# Patient Record
Sex: MALE | Race: WHITE | NOT HISPANIC OR LATINO | Employment: OTHER | URBAN - METROPOLITAN AREA
[De-identification: names, ages, dates, MRNs, and addresses within clinical notes are randomized per-mention and may not be internally consistent; named-entity substitution may affect disease eponyms.]

---

## 2020-10-06 ENCOUNTER — HOSPITAL ENCOUNTER (INPATIENT)
Facility: HOSPITAL | Age: 75
LOS: 9 days | Discharge: NURSING, REHAB OR LONG-TERM CARE | DRG: 897 | End: 2020-10-15
Attending: EMERGENCY MEDICINE | Admitting: INTERNAL MEDICINE
Payer: MEDICARE

## 2020-10-06 DIAGNOSIS — R56.9 CONVULSIONS, UNSPECIFIED CONVULSION TYPE: Primary | ICD-10-CM

## 2020-10-06 DIAGNOSIS — E87.1 HYPONATREMIA: ICD-10-CM

## 2020-10-06 DIAGNOSIS — R56.9 NEW ONSET SEIZURE: ICD-10-CM

## 2020-10-06 DIAGNOSIS — M25.562 PAIN IN BOTH KNEES, UNSPECIFIED CHRONICITY: Chronic | ICD-10-CM

## 2020-10-06 DIAGNOSIS — M11.89 PSEUDOGOUT INVOLVING MULTIPLE JOINTS: ICD-10-CM

## 2020-10-06 DIAGNOSIS — R55 SYNCOPE, UNSPECIFIED SYNCOPE TYPE: ICD-10-CM

## 2020-10-06 DIAGNOSIS — M25.561 PAIN IN BOTH KNEES, UNSPECIFIED CHRONICITY: Chronic | ICD-10-CM

## 2020-10-06 DIAGNOSIS — M79.672 BILATERAL PAIN OF LEG AND FOOT: ICD-10-CM

## 2020-10-06 DIAGNOSIS — R07.9 CHEST PAIN: ICD-10-CM

## 2020-10-06 DIAGNOSIS — D72.829 LEUKOCYTOSIS, UNSPECIFIED TYPE: ICD-10-CM

## 2020-10-06 DIAGNOSIS — L03.116 CELLULITIS OF LEFT LOWER EXTREMITY: ICD-10-CM

## 2020-10-06 DIAGNOSIS — M79.671 BILATERAL PAIN OF LEG AND FOOT: ICD-10-CM

## 2020-10-06 DIAGNOSIS — M79.604 BILATERAL PAIN OF LEG AND FOOT: ICD-10-CM

## 2020-10-06 DIAGNOSIS — R55 SYNCOPE: ICD-10-CM

## 2020-10-06 DIAGNOSIS — M79.605 BILATERAL PAIN OF LEG AND FOOT: ICD-10-CM

## 2020-10-06 LAB
ALBUMIN SERPL BCP-MCNC: 3.9 G/DL (ref 3.5–5.2)
ALP SERPL-CCNC: 94 U/L (ref 55–135)
ALT SERPL W/O P-5'-P-CCNC: 21 U/L (ref 10–44)
ANION GAP SERPL CALC-SCNC: 12 MMOL/L (ref 8–16)
AST SERPL-CCNC: 33 U/L (ref 10–40)
BASOPHILS # BLD AUTO: 0.04 K/UL (ref 0–0.2)
BASOPHILS NFR BLD: 0.3 % (ref 0–1.9)
BILIRUB SERPL-MCNC: 1.1 MG/DL (ref 0.1–1)
BILIRUB UR QL STRIP: NEGATIVE
BNP SERPL-MCNC: 97 PG/ML (ref 0–99)
BUN SERPL-MCNC: 6 MG/DL (ref 6–30)
BUN SERPL-MCNC: 7 MG/DL (ref 8–23)
CALCIUM SERPL-MCNC: 9 MG/DL (ref 8.7–10.5)
CHLORIDE SERPL-SCNC: 94 MMOL/L (ref 95–110)
CHLORIDE SERPL-SCNC: 95 MMOL/L (ref 95–110)
CLARITY UR REFRACT.AUTO: CLEAR
CO2 SERPL-SCNC: 24 MMOL/L (ref 23–29)
COLOR UR AUTO: YELLOW
CREAT SERPL-MCNC: 0.8 MG/DL (ref 0.5–1.4)
CREAT SERPL-MCNC: 0.9 MG/DL (ref 0.5–1.4)
DIFFERENTIAL METHOD: ABNORMAL
EOSINOPHIL # BLD AUTO: 0 K/UL (ref 0–0.5)
EOSINOPHIL NFR BLD: 0.1 % (ref 0–8)
ERYTHROCYTE [DISTWIDTH] IN BLOOD BY AUTOMATED COUNT: 12.3 % (ref 11.5–14.5)
EST. GFR  (AFRICAN AMERICAN): >60 ML/MIN/1.73 M^2
EST. GFR  (NON AFRICAN AMERICAN): >60 ML/MIN/1.73 M^2
ETHANOL SERPL-MCNC: <10 MG/DL
GLUCOSE SERPL-MCNC: 164 MG/DL (ref 70–110)
GLUCOSE SERPL-MCNC: 169 MG/DL (ref 70–110)
GLUCOSE UR QL STRIP: NEGATIVE
HCT VFR BLD AUTO: 43.1 % (ref 40–54)
HCT VFR BLD CALC: 45 %PCV (ref 36–54)
HGB BLD-MCNC: 15 G/DL (ref 14–18)
HGB UR QL STRIP: NEGATIVE
IMM GRANULOCYTES # BLD AUTO: 0.06 K/UL (ref 0–0.04)
IMM GRANULOCYTES NFR BLD AUTO: 0.4 % (ref 0–0.5)
INR PPP: 1 (ref 0.8–1.2)
KETONES UR QL STRIP: NEGATIVE
LEUKOCYTE ESTERASE UR QL STRIP: NEGATIVE
LYMPHOCYTES # BLD AUTO: 0.9 K/UL (ref 1–4.8)
LYMPHOCYTES NFR BLD: 6.4 % (ref 18–48)
MCH RBC QN AUTO: 32.3 PG (ref 27–31)
MCHC RBC AUTO-ENTMCNC: 34.8 G/DL (ref 32–36)
MCV RBC AUTO: 93 FL (ref 82–98)
MONOCYTES # BLD AUTO: 1.4 K/UL (ref 0.3–1)
MONOCYTES NFR BLD: 9.7 % (ref 4–15)
NEUTROPHILS # BLD AUTO: 12.2 K/UL (ref 1.8–7.7)
NEUTROPHILS NFR BLD: 83.1 % (ref 38–73)
NITRITE UR QL STRIP: NEGATIVE
NRBC BLD-RTO: 0 /100 WBC
PH UR STRIP: 6 [PH] (ref 5–8)
PLATELET # BLD AUTO: 271 K/UL (ref 150–350)
PMV BLD AUTO: 11.1 FL (ref 9.2–12.9)
POC IONIZED CALCIUM: 1.08 MMOL/L (ref 1.06–1.42)
POC TCO2 (MEASURED): 23 MMOL/L (ref 23–29)
POTASSIUM BLD-SCNC: 3.5 MMOL/L (ref 3.5–5.1)
POTASSIUM SERPL-SCNC: 3.5 MMOL/L (ref 3.5–5.1)
PROT SERPL-MCNC: 8 G/DL (ref 6–8.4)
PROT UR QL STRIP: NEGATIVE
PROTHROMBIN TIME: 11.4 SEC (ref 9–12.5)
RBC # BLD AUTO: 4.64 M/UL (ref 4.6–6.2)
SAMPLE: ABNORMAL
SODIUM BLD-SCNC: 132 MMOL/L (ref 136–145)
SODIUM SERPL-SCNC: 131 MMOL/L (ref 136–145)
SP GR UR STRIP: 1.01 (ref 1–1.03)
URN SPEC COLLECT METH UR: NORMAL
WBC # BLD AUTO: 14.71 K/UL (ref 3.9–12.7)

## 2020-10-06 PROCEDURE — 80047 BASIC METABLC PNL IONIZED CA: CPT

## 2020-10-06 PROCEDURE — 99291 CRITICAL CARE FIRST HOUR: CPT | Mod: 25

## 2020-10-06 PROCEDURE — U0002 COVID-19 LAB TEST NON-CDC: HCPCS | Performed by: EMERGENCY MEDICINE

## 2020-10-06 PROCEDURE — 93005 ELECTROCARDIOGRAM TRACING: CPT

## 2020-10-06 PROCEDURE — 93010 ELECTROCARDIOGRAM REPORT: CPT | Mod: ,,, | Performed by: INTERNAL MEDICINE

## 2020-10-06 PROCEDURE — 85610 PROTHROMBIN TIME: CPT

## 2020-10-06 PROCEDURE — 99291 CRITICAL CARE FIRST HOUR: CPT | Mod: GC,CS,, | Performed by: EMERGENCY MEDICINE

## 2020-10-06 PROCEDURE — 81003 URINALYSIS AUTO W/O SCOPE: CPT

## 2020-10-06 PROCEDURE — 12000002 HC ACUTE/MED SURGE SEMI-PRIVATE ROOM

## 2020-10-06 PROCEDURE — 99291 PR CRITICAL CARE, E/M 30-74 MINUTES: ICD-10-PCS | Mod: GC,CS,, | Performed by: EMERGENCY MEDICINE

## 2020-10-06 PROCEDURE — 96365 THER/PROPH/DIAG IV INF INIT: CPT

## 2020-10-06 PROCEDURE — 83880 ASSAY OF NATRIURETIC PEPTIDE: CPT

## 2020-10-06 PROCEDURE — 80053 COMPREHEN METABOLIC PANEL: CPT

## 2020-10-06 PROCEDURE — 85025 COMPLETE CBC W/AUTO DIFF WBC: CPT

## 2020-10-06 PROCEDURE — 80320 DRUG SCREEN QUANTALCOHOLS: CPT

## 2020-10-06 PROCEDURE — 93010 EKG 12-LEAD: ICD-10-PCS | Mod: ,,, | Performed by: INTERNAL MEDICINE

## 2020-10-06 PROCEDURE — 63600175 PHARM REV CODE 636 W HCPCS: Performed by: EMERGENCY MEDICINE

## 2020-10-06 RX ORDER — LORAZEPAM 2 MG/ML
INJECTION INTRAMUSCULAR
Status: DISPENSED
Start: 2020-10-06 | End: 2020-10-07

## 2020-10-06 RX ORDER — LEVETIRACETAM 10 MG/ML
1000 INJECTION INTRAVASCULAR
Status: COMPLETED | OUTPATIENT
Start: 2020-10-07 | End: 2020-10-07

## 2020-10-06 RX ORDER — LORAZEPAM 2 MG/ML
2 INJECTION INTRAMUSCULAR
Status: DISCONTINUED | OUTPATIENT
Start: 2020-10-06 | End: 2020-10-06 | Stop reason: ALTCHOICE

## 2020-10-06 RX ORDER — LEVETIRACETAM 10 MG/ML
1000 INJECTION INTRAVASCULAR
Status: COMPLETED | OUTPATIENT
Start: 2020-10-06 | End: 2020-10-07

## 2020-10-06 RX ADMIN — LEVETIRACETAM INJECTION 1000 MG: 10 INJECTION INTRAVENOUS at 11:10

## 2020-10-07 PROBLEM — L03.116 CELLULITIS OF LEFT LOWER EXTREMITY: Status: ACTIVE | Noted: 2020-10-07

## 2020-10-07 PROBLEM — R55 SYNCOPE: Status: ACTIVE | Noted: 2020-10-07

## 2020-10-07 PROBLEM — R56.9 CONVULSION: Status: ACTIVE | Noted: 2020-10-07

## 2020-10-07 PROBLEM — R07.89 OTHER CHEST PAIN: Status: ACTIVE | Noted: 2020-10-07

## 2020-10-07 LAB
ALBUMIN SERPL BCP-MCNC: 3.7 G/DL (ref 3.5–5.2)
ALBUMIN SERPL BCP-MCNC: 3.7 G/DL (ref 3.5–5.2)
ALP SERPL-CCNC: 90 U/L (ref 55–135)
ALP SERPL-CCNC: 91 U/L (ref 55–135)
ALT SERPL W/O P-5'-P-CCNC: 19 U/L (ref 10–44)
ALT SERPL W/O P-5'-P-CCNC: 20 U/L (ref 10–44)
AMMONIA PLAS-SCNC: 32 UMOL/L (ref 10–50)
ANION GAP SERPL CALC-SCNC: 12 MMOL/L (ref 8–16)
ANION GAP SERPL CALC-SCNC: 16 MMOL/L (ref 8–16)
ASCENDING AORTA: 3.72 CM
AST SERPL-CCNC: 37 U/L (ref 10–40)
AST SERPL-CCNC: 39 U/L (ref 10–40)
AV INDEX (PROSTH): 1.3
AV MEAN GRADIENT: 2 MMHG
AV PEAK GRADIENT: 3 MMHG
AV VALVE AREA: 6.06 CM2
AV VELOCITY RATIO: 1.16
BASOPHILS # BLD AUTO: 0.03 K/UL (ref 0–0.2)
BASOPHILS # BLD AUTO: 0.04 K/UL (ref 0–0.2)
BASOPHILS NFR BLD: 0.2 % (ref 0–1.9)
BASOPHILS NFR BLD: 0.3 % (ref 0–1.9)
BILIRUB SERPL-MCNC: 1.3 MG/DL (ref 0.1–1)
BILIRUB SERPL-MCNC: 1.5 MG/DL (ref 0.1–1)
BSA FOR ECHO PROCEDURE: 2.24 M2
BUN SERPL-MCNC: 5 MG/DL (ref 8–23)
BUN SERPL-MCNC: 6 MG/DL (ref 8–23)
CALCIUM SERPL-MCNC: 8.5 MG/DL (ref 8.7–10.5)
CALCIUM SERPL-MCNC: 8.5 MG/DL (ref 8.7–10.5)
CHLORIDE SERPL-SCNC: 97 MMOL/L (ref 95–110)
CHLORIDE SERPL-SCNC: 99 MMOL/L (ref 95–110)
CO2 SERPL-SCNC: 21 MMOL/L (ref 23–29)
CO2 SERPL-SCNC: 25 MMOL/L (ref 23–29)
CREAT SERPL-MCNC: 0.8 MG/DL (ref 0.5–1.4)
CREAT SERPL-MCNC: 0.8 MG/DL (ref 0.5–1.4)
CRP SERPL-MCNC: 33 MG/L (ref 0–8.2)
CTP QC/QA: YES
CV ECHO LV RWT: 0.31 CM
DIFFERENTIAL METHOD: ABNORMAL
DIFFERENTIAL METHOD: ABNORMAL
DOP CALC AO PEAK VEL: 0.89 M/S
DOP CALC AO VTI: 20.04 CM
DOP CALC LVOT AREA: 4.7 CM2
DOP CALC LVOT DIAMETER: 2.44 CM
DOP CALC LVOT PEAK VEL: 1.03 M/S
DOP CALC LVOT STROKE VOLUME: 121.51 CM3
DOP CALCLVOT PEAK VEL VTI: 26 CM
E WAVE DECELERATION TIME: 277.03 MSEC
E/A RATIO: 0.96
E/E' RATIO: 10.93 M/S
ECHO LV POSTERIOR WALL: 0.74 CM (ref 0.6–1.1)
EOSINOPHIL # BLD AUTO: 0 K/UL (ref 0–0.5)
EOSINOPHIL # BLD AUTO: 0 K/UL (ref 0–0.5)
EOSINOPHIL NFR BLD: 0 % (ref 0–8)
EOSINOPHIL NFR BLD: 0.2 % (ref 0–8)
ERYTHROCYTE [DISTWIDTH] IN BLOOD BY AUTOMATED COUNT: 12.3 % (ref 11.5–14.5)
ERYTHROCYTE [DISTWIDTH] IN BLOOD BY AUTOMATED COUNT: 12.4 % (ref 11.5–14.5)
ERYTHROCYTE [SEDIMENTATION RATE] IN BLOOD BY WESTERGREN METHOD: 25 MM/HR (ref 0–23)
EST. GFR  (AFRICAN AMERICAN): >60 ML/MIN/1.73 M^2
EST. GFR  (AFRICAN AMERICAN): >60 ML/MIN/1.73 M^2
EST. GFR  (NON AFRICAN AMERICAN): >60 ML/MIN/1.73 M^2
EST. GFR  (NON AFRICAN AMERICAN): >60 ML/MIN/1.73 M^2
ESTIMATED AVG GLUCOSE: 111 MG/DL (ref 68–131)
FRACTIONAL SHORTENING: 34 % (ref 28–44)
GLUCOSE SERPL-MCNC: 116 MG/DL (ref 70–110)
GLUCOSE SERPL-MCNC: 125 MG/DL (ref 70–110)
HBA1C MFR BLD HPLC: 5.5 % (ref 4–5.6)
HCT VFR BLD AUTO: 42 % (ref 40–54)
HCT VFR BLD AUTO: 43.9 % (ref 40–54)
HGB BLD-MCNC: 14.7 G/DL (ref 14–18)
HGB BLD-MCNC: 15.1 G/DL (ref 14–18)
IMM GRANULOCYTES # BLD AUTO: 0.07 K/UL (ref 0–0.04)
IMM GRANULOCYTES # BLD AUTO: 0.09 K/UL (ref 0–0.04)
IMM GRANULOCYTES NFR BLD AUTO: 0.5 % (ref 0–0.5)
IMM GRANULOCYTES NFR BLD AUTO: 0.7 % (ref 0–0.5)
INTERVENTRICULAR SEPTUM: 0.91 CM (ref 0.6–1.1)
LA MAJOR: 4.82 CM
LA MINOR: 5.33 CM
LA WIDTH: 4.35 CM
LACTATE SERPL-SCNC: 1.3 MMOL/L (ref 0.5–2.2)
LEFT ATRIUM SIZE: 3 CM
LEFT ATRIUM VOLUME INDEX: 25.6 ML/M2
LEFT ATRIUM VOLUME: 56.15 CM3
LEFT INTERNAL DIMENSION IN SYSTOLE: 3.17 CM (ref 2.1–4)
LEFT VENTRICLE DIASTOLIC VOLUME INDEX: 49.49 ML/M2
LEFT VENTRICLE DIASTOLIC VOLUME: 108.6 ML
LEFT VENTRICLE MASS INDEX: 61 G/M2
LEFT VENTRICLE SYSTOLIC VOLUME INDEX: 18.2 ML/M2
LEFT VENTRICLE SYSTOLIC VOLUME: 40.01 ML
LEFT VENTRICULAR INTERNAL DIMENSION IN DIASTOLE: 4.82 CM (ref 3.5–6)
LEFT VENTRICULAR MASS: 132.77 G
LV LATERAL E/E' RATIO: 10.25 M/S
LV SEPTAL E/E' RATIO: 11.71 M/S
LYMPHOCYTES # BLD AUTO: 1 K/UL (ref 1–4.8)
LYMPHOCYTES # BLD AUTO: 1.3 K/UL (ref 1–4.8)
LYMPHOCYTES NFR BLD: 7.8 % (ref 18–48)
LYMPHOCYTES NFR BLD: 8.7 % (ref 18–48)
MAGNESIUM SERPL-MCNC: 2.1 MG/DL (ref 1.6–2.6)
MCH RBC QN AUTO: 32.3 PG (ref 27–31)
MCH RBC QN AUTO: 32.3 PG (ref 27–31)
MCHC RBC AUTO-ENTMCNC: 34.4 G/DL (ref 32–36)
MCHC RBC AUTO-ENTMCNC: 35 G/DL (ref 32–36)
MCV RBC AUTO: 92 FL (ref 82–98)
MCV RBC AUTO: 94 FL (ref 82–98)
MONOCYTES # BLD AUTO: 1.5 K/UL (ref 0.3–1)
MONOCYTES # BLD AUTO: 1.7 K/UL (ref 0.3–1)
MONOCYTES NFR BLD: 10.4 % (ref 4–15)
MONOCYTES NFR BLD: 12.8 % (ref 4–15)
MV PEAK A VEL: 0.85 M/S
MV PEAK E VEL: 0.82 M/S
MV STENOSIS PRESSURE HALF TIME: 80.34 MS
MV VALVE AREA P 1/2 METHOD: 2.74 CM2
NEUTROPHILS # BLD AUTO: 10.1 K/UL (ref 1.8–7.7)
NEUTROPHILS # BLD AUTO: 11.8 K/UL (ref 1.8–7.7)
NEUTROPHILS NFR BLD: 78.2 % (ref 38–73)
NEUTROPHILS NFR BLD: 80.2 % (ref 38–73)
NRBC BLD-RTO: 0 /100 WBC
NRBC BLD-RTO: 0 /100 WBC
PHOSPHATE SERPL-MCNC: 3 MG/DL (ref 2.7–4.5)
PLATELET # BLD AUTO: 255 K/UL (ref 150–350)
PLATELET # BLD AUTO: 261 K/UL (ref 150–350)
PMV BLD AUTO: 11.4 FL (ref 9.2–12.9)
PMV BLD AUTO: 11.4 FL (ref 9.2–12.9)
POCT GLUCOSE: 132 MG/DL (ref 70–110)
POTASSIUM SERPL-SCNC: 3.1 MMOL/L (ref 3.5–5.1)
POTASSIUM SERPL-SCNC: 3.1 MMOL/L (ref 3.5–5.1)
PROT SERPL-MCNC: 7.6 G/DL (ref 6–8.4)
PROT SERPL-MCNC: 7.6 G/DL (ref 6–8.4)
RA MAJOR: 4.59 CM
RA PRESSURE: 3 MMHG
RA WIDTH: 3.16 CM
RBC # BLD AUTO: 4.55 M/UL (ref 4.6–6.2)
RBC # BLD AUTO: 4.67 M/UL (ref 4.6–6.2)
RV TISSUE DOPPLER FREE WALL SYSTOLIC VELOCITY 1 (APICAL 4 CHAMBER VIEW): 16.53 CM/S
SARS-COV-2 RDRP RESP QL NAA+PROBE: NEGATIVE
SINUS: 3.8 CM
SODIUM SERPL-SCNC: 134 MMOL/L (ref 136–145)
SODIUM SERPL-SCNC: 136 MMOL/L (ref 136–145)
STJ: 3.68 CM
TDI LATERAL: 0.08 M/S
TDI SEPTAL: 0.07 M/S
TDI: 0.08 M/S
TRICUSPID ANNULAR PLANE SYSTOLIC EXCURSION: 1.95 CM
TROPONIN I SERPL DL<=0.01 NG/ML-MCNC: <0.006 NG/ML (ref 0–0.03)
WBC # BLD AUTO: 12.85 K/UL (ref 3.9–12.7)
WBC # BLD AUTO: 14.74 K/UL (ref 3.9–12.7)

## 2020-10-07 PROCEDURE — 96366 THER/PROPH/DIAG IV INF ADDON: CPT

## 2020-10-07 PROCEDURE — 99220 PR INITIAL OBSERVATION CARE,LEVL III: ICD-10-PCS | Mod: ,,, | Performed by: NURSE PRACTITIONER

## 2020-10-07 PROCEDURE — 25500020 PHARM REV CODE 255: Performed by: INTERNAL MEDICINE

## 2020-10-07 PROCEDURE — 84484 ASSAY OF TROPONIN QUANT: CPT

## 2020-10-07 PROCEDURE — 83036 HEMOGLOBIN GLYCOSYLATED A1C: CPT

## 2020-10-07 PROCEDURE — 96376 TX/PRO/DX INJ SAME DRUG ADON: CPT

## 2020-10-07 PROCEDURE — 63600175 PHARM REV CODE 636 W HCPCS: Performed by: EMERGENCY MEDICINE

## 2020-10-07 PROCEDURE — 82140 ASSAY OF AMMONIA: CPT

## 2020-10-07 PROCEDURE — 94761 N-INVAS EAR/PLS OXIMETRY MLT: CPT

## 2020-10-07 PROCEDURE — 85025 COMPLETE CBC W/AUTO DIFF WBC: CPT | Mod: 91

## 2020-10-07 PROCEDURE — 99205 OFFICE O/P NEW HI 60 MIN: CPT | Mod: ,,, | Performed by: PSYCHIATRY & NEUROLOGY

## 2020-10-07 PROCEDURE — 93010 ELECTROCARDIOGRAM REPORT: CPT | Mod: ,,, | Performed by: INTERNAL MEDICINE

## 2020-10-07 PROCEDURE — 96361 HYDRATE IV INFUSION ADD-ON: CPT

## 2020-10-07 PROCEDURE — 83735 ASSAY OF MAGNESIUM: CPT

## 2020-10-07 PROCEDURE — 85652 RBC SED RATE AUTOMATED: CPT

## 2020-10-07 PROCEDURE — G0378 HOSPITAL OBSERVATION PER HR: HCPCS

## 2020-10-07 PROCEDURE — 86140 C-REACTIVE PROTEIN: CPT

## 2020-10-07 PROCEDURE — 63600175 PHARM REV CODE 636 W HCPCS: Performed by: NURSE PRACTITIONER

## 2020-10-07 PROCEDURE — A9585 GADOBUTROL INJECTION: HCPCS | Performed by: INTERNAL MEDICINE

## 2020-10-07 PROCEDURE — 25000003 PHARM REV CODE 250: Performed by: NURSE PRACTITIONER

## 2020-10-07 PROCEDURE — 93010 EKG 12-LEAD: ICD-10-PCS | Mod: ,,, | Performed by: INTERNAL MEDICINE

## 2020-10-07 PROCEDURE — 99220 PR INITIAL OBSERVATION CARE,LEVL III: CPT | Mod: ,,, | Performed by: NURSE PRACTITIONER

## 2020-10-07 PROCEDURE — 11000001 HC ACUTE MED/SURG PRIVATE ROOM

## 2020-10-07 PROCEDURE — 96372 THER/PROPH/DIAG INJ SC/IM: CPT | Mod: 59

## 2020-10-07 PROCEDURE — 93005 ELECTROCARDIOGRAM TRACING: CPT

## 2020-10-07 PROCEDURE — 63600175 PHARM REV CODE 636 W HCPCS: Performed by: INTERNAL MEDICINE

## 2020-10-07 PROCEDURE — 83605 ASSAY OF LACTIC ACID: CPT

## 2020-10-07 PROCEDURE — 99217 PR OBSERVATION CARE DISCHARGE: ICD-10-PCS | Mod: ,,, | Performed by: PHYSICIAN ASSISTANT

## 2020-10-07 PROCEDURE — 36415 COLL VENOUS BLD VENIPUNCTURE: CPT

## 2020-10-07 PROCEDURE — 84100 ASSAY OF PHOSPHORUS: CPT

## 2020-10-07 PROCEDURE — 99205 PR OFFICE/OUTPT VISIT, NEW, LEVL V, 60-74 MIN: ICD-10-PCS | Mod: ,,, | Performed by: PSYCHIATRY & NEUROLOGY

## 2020-10-07 PROCEDURE — 25000003 PHARM REV CODE 250: Performed by: PHYSICIAN ASSISTANT

## 2020-10-07 PROCEDURE — 80053 COMPREHEN METABOLIC PANEL: CPT | Mod: 91

## 2020-10-07 PROCEDURE — 99217 PR OBSERVATION CARE DISCHARGE: CPT | Mod: ,,, | Performed by: PHYSICIAN ASSISTANT

## 2020-10-07 PROCEDURE — 96375 TX/PRO/DX INJ NEW DRUG ADDON: CPT

## 2020-10-07 PROCEDURE — 87040 BLOOD CULTURE FOR BACTERIA: CPT | Mod: 59

## 2020-10-07 PROCEDURE — 80053 COMPREHEN METABOLIC PANEL: CPT

## 2020-10-07 PROCEDURE — 25000003 PHARM REV CODE 250: Performed by: INTERNAL MEDICINE

## 2020-10-07 RX ORDER — AMLODIPINE BESYLATE 10 MG/1
10 TABLET ORAL DAILY
Status: DISCONTINUED | OUTPATIENT
Start: 2020-10-07 | End: 2020-10-15 | Stop reason: HOSPADM

## 2020-10-07 RX ORDER — IBUPROFEN 200 MG
16 TABLET ORAL
Status: DISCONTINUED | OUTPATIENT
Start: 2020-10-07 | End: 2020-10-15 | Stop reason: HOSPADM

## 2020-10-07 RX ORDER — FOLIC ACID 5 MG/ML
1 INJECTION, SOLUTION INTRAMUSCULAR; INTRAVENOUS; SUBCUTANEOUS DAILY
Status: DISCONTINUED | OUTPATIENT
Start: 2020-10-07 | End: 2020-10-08

## 2020-10-07 RX ORDER — POLYETHYLENE GLYCOL 3350 17 G/17G
17 POWDER, FOR SOLUTION ORAL DAILY
Status: DISCONTINUED | OUTPATIENT
Start: 2020-10-07 | End: 2020-10-15 | Stop reason: HOSPADM

## 2020-10-07 RX ORDER — LEVETIRACETAM 5 MG/ML
500 INJECTION INTRAVASCULAR EVERY 12 HOURS
Status: DISCONTINUED | OUTPATIENT
Start: 2020-10-07 | End: 2020-10-07

## 2020-10-07 RX ORDER — FUROSEMIDE 40 MG/1
40 TABLET ORAL DAILY
COMMUNITY
Start: 2020-09-23 | End: 2022-07-28

## 2020-10-07 RX ORDER — HYDROCHLOROTHIAZIDE 12.5 MG/1
12.5 TABLET ORAL DAILY
Status: DISCONTINUED | OUTPATIENT
Start: 2020-10-07 | End: 2020-10-15 | Stop reason: HOSPADM

## 2020-10-07 RX ORDER — IPRATROPIUM BROMIDE AND ALBUTEROL SULFATE 2.5; .5 MG/3ML; MG/3ML
3 SOLUTION RESPIRATORY (INHALATION) EVERY 6 HOURS PRN
Status: DISCONTINUED | OUTPATIENT
Start: 2020-10-07 | End: 2020-10-15 | Stop reason: HOSPADM

## 2020-10-07 RX ORDER — ONDANSETRON 8 MG/1
8 TABLET, ORALLY DISINTEGRATING ORAL EVERY 8 HOURS PRN
Status: DISCONTINUED | OUTPATIENT
Start: 2020-10-07 | End: 2020-10-15 | Stop reason: HOSPADM

## 2020-10-07 RX ORDER — THIAMINE HCL 100 MG
100 TABLET ORAL DAILY
Status: DISCONTINUED | OUTPATIENT
Start: 2020-10-07 | End: 2020-10-15 | Stop reason: HOSPADM

## 2020-10-07 RX ORDER — GLUCAGON 1 MG
1 KIT INJECTION
Status: DISCONTINUED | OUTPATIENT
Start: 2020-10-07 | End: 2020-10-15 | Stop reason: HOSPADM

## 2020-10-07 RX ORDER — DIAZEPAM 5 MG/1
5 TABLET ORAL 3 TIMES DAILY
Status: DISCONTINUED | OUTPATIENT
Start: 2020-10-07 | End: 2020-10-08

## 2020-10-07 RX ORDER — HEPARIN SODIUM 5000 [USP'U]/ML
5000 INJECTION, SOLUTION INTRAVENOUS; SUBCUTANEOUS EVERY 8 HOURS
Status: DISCONTINUED | OUTPATIENT
Start: 2020-10-07 | End: 2020-10-15 | Stop reason: HOSPADM

## 2020-10-07 RX ORDER — ACETAMINOPHEN 325 MG/1
650 TABLET ORAL EVERY 8 HOURS PRN
Status: DISCONTINUED | OUTPATIENT
Start: 2020-10-07 | End: 2020-10-15 | Stop reason: HOSPADM

## 2020-10-07 RX ORDER — SODIUM CHLORIDE 9 MG/ML
INJECTION, SOLUTION INTRAVENOUS CONTINUOUS
Status: DISCONTINUED | OUTPATIENT
Start: 2020-10-07 | End: 2020-10-14

## 2020-10-07 RX ORDER — BISACODYL 10 MG
10 SUPPOSITORY, RECTAL RECTAL DAILY PRN
Status: DISCONTINUED | OUTPATIENT
Start: 2020-10-07 | End: 2020-10-15 | Stop reason: HOSPADM

## 2020-10-07 RX ORDER — METOPROLOL TARTRATE 100 MG/1
100 TABLET ORAL 2 TIMES DAILY
Status: DISCONTINUED | OUTPATIENT
Start: 2020-10-07 | End: 2020-10-15 | Stop reason: HOSPADM

## 2020-10-07 RX ORDER — GADOBUTROL 604.72 MG/ML
10 INJECTION INTRAVENOUS
Status: COMPLETED | OUTPATIENT
Start: 2020-10-07 | End: 2020-10-07

## 2020-10-07 RX ORDER — TALC
6 POWDER (GRAM) TOPICAL NIGHTLY PRN
Status: DISCONTINUED | OUTPATIENT
Start: 2020-10-07 | End: 2020-10-15 | Stop reason: HOSPADM

## 2020-10-07 RX ORDER — ASPIRIN 81 MG/1
81 TABLET ORAL DAILY
Status: DISCONTINUED | OUTPATIENT
Start: 2020-10-07 | End: 2020-10-15 | Stop reason: HOSPADM

## 2020-10-07 RX ORDER — IBUPROFEN 200 MG
24 TABLET ORAL
Status: DISCONTINUED | OUTPATIENT
Start: 2020-10-07 | End: 2020-10-15 | Stop reason: HOSPADM

## 2020-10-07 RX ORDER — FUROSEMIDE 40 MG/1
40 TABLET ORAL DAILY
Status: DISCONTINUED | OUTPATIENT
Start: 2020-10-07 | End: 2020-10-15 | Stop reason: HOSPADM

## 2020-10-07 RX ORDER — LORAZEPAM 0.5 MG/1
2 TABLET ORAL EVERY 4 HOURS PRN
Status: DISCONTINUED | OUTPATIENT
Start: 2020-10-07 | End: 2020-10-15 | Stop reason: HOSPADM

## 2020-10-07 RX ORDER — ONDANSETRON 2 MG/ML
4 INJECTION INTRAMUSCULAR; INTRAVENOUS EVERY 8 HOURS PRN
Status: DISCONTINUED | OUTPATIENT
Start: 2020-10-07 | End: 2020-10-15 | Stop reason: HOSPADM

## 2020-10-07 RX ADMIN — HEPARIN SODIUM 5000 UNITS: 5000 INJECTION INTRAVENOUS; SUBCUTANEOUS at 09:10

## 2020-10-07 RX ADMIN — METOPROLOL TARTRATE 100 MG: 100 TABLET ORAL at 09:10

## 2020-10-07 RX ADMIN — Medication 100 MG: at 09:10

## 2020-10-07 RX ADMIN — GADOBUTROL 10 ML: 604.72 INJECTION INTRAVENOUS at 08:10

## 2020-10-07 RX ADMIN — HEPARIN SODIUM 5000 UNITS: 5000 INJECTION INTRAVENOUS; SUBCUTANEOUS at 03:10

## 2020-10-07 RX ADMIN — FUROSEMIDE 40 MG: 40 TABLET ORAL at 09:10

## 2020-10-07 RX ADMIN — FOLIC ACID 1 MG: 5 INJECTION, SOLUTION INTRAMUSCULAR; INTRAVENOUS; SUBCUTANEOUS at 09:10

## 2020-10-07 RX ADMIN — MELATONIN TAB 3 MG 6 MG: 3 TAB at 09:10

## 2020-10-07 RX ADMIN — AMLODIPINE BESYLATE 10 MG: 10 TABLET ORAL at 09:10

## 2020-10-07 RX ADMIN — VANCOMYCIN HYDROCHLORIDE 2000 MG: 10 INJECTION, POWDER, LYOPHILIZED, FOR SOLUTION INTRAVENOUS at 03:10

## 2020-10-07 RX ADMIN — LEVETIRACETAM INJECTION 500 MG: 5 INJECTION INTRAVENOUS at 09:10

## 2020-10-07 RX ADMIN — SODIUM CHLORIDE: 0.9 INJECTION, SOLUTION INTRAVENOUS at 02:10

## 2020-10-07 RX ADMIN — LEVETIRACETAM INJECTION 1000 MG: 10 INJECTION INTRAVENOUS at 12:10

## 2020-10-07 RX ADMIN — POTASSIUM BICARBONATE 50 MEQ: 978 TABLET, EFFERVESCENT ORAL at 10:10

## 2020-10-07 RX ADMIN — HEPARIN SODIUM 5000 UNITS: 5000 INJECTION INTRAVENOUS; SUBCUTANEOUS at 05:10

## 2020-10-07 RX ADMIN — SODIUM CHLORIDE: 0.9 INJECTION, SOLUTION INTRAVENOUS at 01:10

## 2020-10-07 RX ADMIN — THERA TABS 1 TABLET: TAB at 09:10

## 2020-10-07 RX ADMIN — ASPIRIN 81 MG: 81 TABLET, COATED ORAL at 09:10

## 2020-10-07 NOTE — ASSESSMENT & PLAN NOTE
-Continue home Norvasc, HCTZ, metoprolol, and lasix  -Continuing home meds may compensate for s/s of ETOH withdrawals.  Other s/s such as tremors, diaphoresis, hallucinations, etc may be more reliable indicators.

## 2020-10-07 NOTE — HPI
75 y.o. male with HTN and alcohol abuse (10-12 beers daily) presented to ED 10/6/20 after 2 LOC episodes at home. Wife reports first episode occurred around 10 am. Pt was found lying on his side on the floor in the garage making abnormally loud respirations. Pt thought he had fainted. [place] tired afterwards, ate lunch and rested for a few hours. Later that evening, wife was inside and heard a thud in the garage. Once again he was found lying on the ground with abnormally loud respirations and this time blood coming out of his mouth. Wife called EMS and in the ED, he was witnessed to have a 3rd episode of tonic-clonic movements and tongue biting. He was given 1 g of Keppra x 2 then started on 500 mg BID. Labs remarkable for leukocytosis (14.71>>12.85) and hyponatremia (131). CT head WO contrast unremarkable for acute intracranial pathology. Placed on CIWA protocol and started on folic acid and thiamine given alcohol intake history. Wife says he had an abnormally small amount of alcohol yesterday, probably ~4 beers, but otherwise has not cut back on drinking lately. Denies any previous hx of seizures, family hx of epilepsy, CNS infections. Remote head trauma from football. Denies taking medications that lower the seizure threshold. Neurology consulted 10/7 for new onset seizures.

## 2020-10-07 NOTE — ED NOTES
Adult Physical Assessment  LOC: Roshan Rankin, 75 y.o. male verified via two identifiers.  The patient is awake, alert, oriented and speaking appropriately at this time. +ETOH.  APPEARANCE: Patient resting comfortably and appears to be in no acute distress at this time. Patient is clean and well groomed, patient's clothing is properly fastened.  SKIN:The skin is warm and dry, color consistent with ethnicity, patient has normal skin turgor and moist mucus membranes. Pt with laceration to left side of nose.  MUSCULOSKELETAL: Patient moving all extremities well, no obvious swelling or deformities noted.  RESPIRATORY: Airway is open and patent, respirations are spontaneous, patient has a normal effort and rate, no accessory muscle use noted.  CARDIAC: Patient has a normal rate and rhythm, no periphreal edema noted in any extremity, capillary refill < 3 seconds in all extremities  ABDOMEN: Soft and non tender to palpation, no abdominal distention noted. Bowel sounds present in all four quadrants.  NEUROLOGIC: Eyes open spontaneously, behavior appropriate to situation, follows commands, facial expression symmetrical, bilateral hand grasp equal and even, purposeful motor response noted, normal sensation in all extremities when touched with a finger.

## 2020-10-07 NOTE — ED NOTES
Pt had a seizure at 2126. I called staff and [place] into the room. Episode lasted for approx. 10-15 secs. I placed non-rebreather 15 liters and sat patient up in bed. Ativan at bedside after seizure subsided. Will hold for now. Pt is now having snoring respirations. Suctioned mouth; pt bit tongue with small amount of bleeding. Wife is at the bedside. Call bell within reach. He is on continuous cardiac, bp and pulse ox monitoring.

## 2020-10-07 NOTE — ASSESSMENT & PLAN NOTE
Reports drinking 10-12  12 oz beers daily     --check PETH level  --alcohol cessation counseling provided  --CRISTIANO protocol per primary

## 2020-10-07 NOTE — CONSULTS
[facility] - ICU 14 WT  Neurology  Consult Note    Patient Name: Roshan Rankin  MRN: [id number]  Admission Date: 10/6/2020  Hospital Length of Stay: 0 days  Code Status: Full Code   Attending Provider: Dallas Mayorga MD   Consulting Provider: Esperanza Vasquez PA-C  Primary Care Physician: Filiberto Strong MD (Inactive)  Principal Problem:Syncope    Inpatient consult to Neurology  Consult performed by: Esperanza Vasquez PA-C  Consult ordered by: Loretta Robles PA-C         Subjective:     Chief Complaint:  New onset seizure     HPI:   75 y.o. male with HTN and alcohol abuse (10-12 beers daily) presented to ED 10/6/20 after 2 LOC episodes at home. Wife reports first episode occurred around 10 am. Pt was found lying on his side on the floor in the garage making abnormally loud respirations. Pt thought he had fainted. [place] tired afterwards, ate lunch and rested for a few hours. Later that evening, wife was inside and heard a thud in the garage. Once again he was found lying on the ground with abnormally loud respirations and this time blood coming out of his mouth. Wife called EMS and in the ED, he was witnessed to have a 3rd episode of tonic-clonic movements and tongue biting. He was given 1 g of Keppra x 2 then started on 500 mg BID. Labs remarkable for leukocytosis (14.71>>12.85) and hyponatremia (131). CT head WO contrast unremarkable for acute intracranial pathology. Placed on CIWA protocol and started on folic acid and thiamine given alcohol intake history. Wife says he had an abnormally small amount of alcohol yesterday, probably ~4 beers, but otherwise has not cut back on drinking lately. Denies any previous hx of seizures, family hx of epilepsy, CNS infections. Remote head trauma from football. Denies taking medications that lower the seizure threshold. Neurology consulted 10/7 for new onset seizures.      Past Medical History:   Diagnosis Date    Alcohol abuse     Arthritis     Convulsion  10/7/2020    GERD (gastroesophageal reflux disease)     Glaucoma     Hypertension      Past Surgical History:   Procedure Laterality Date    KNEE CARTILAGE SURGERY Right      Review of patient's allergies indicates:  No Known Allergies    Current Neurological Medications:   Keppra 500 mg BID    No current facility-administered medications on file prior to encounter.      Current Outpatient Medications on File Prior to Encounter   Medication Sig    amlodipine (NORVASC) 10 MG tablet Take 10 mg by mouth once daily.    aspirin (ECOTRIN) 81 MG EC tablet Take 81 mg by mouth once daily.    furosemide (LASIX) 40 MG tablet Take 40 mg by mouth 2 (two) times a day.    latanoprost 0.005 % ophthalmic solution 1 drop once daily.    metoprolol tartrate (LOPRESSOR) 100 MG tablet Take 100 mg by mouth 2 (two) times daily.    omeprazole (PRILOSEC) 40 MG capsule Take 1 capsule (40 mg total) by mouth once daily.    [DISCONTINUED] hydrochlorothiazide (MICROZIDE) 12.5 mg capsule Take 12.5 mg by mouth once daily.     Family History     Problem Relation (Age of Onset)    Hypertension Mother, Brother        Tobacco Use    Smoking status: Never Smoker    Smokeless tobacco: Never Used   Substance and Sexual Activity    Alcohol use: Yes     Alcohol/week: 70.0 standard drinks     Types: 70 Standard drinks or equivalent per week     Comment: drinks between 8-10 beers daily    Drug use: No    Sexual activity: Yes     Partners: Female     Review of Systems   Constitutional: Positive for activity change and fatigue. Negative for fever.   HENT: Negative for trouble swallowing and voice change.    Eyes: Negative for visual disturbance.   Respiratory: Negative for shortness of breath.    Cardiovascular: Negative for chest pain.   Gastrointestinal: Negative for nausea and vomiting.   Musculoskeletal: Positive for arthralgias. Negative for gait problem and neck stiffness.   Neurological: Positive for seizures. Negative for dizziness,  tremors, facial asymmetry, speech difficulty, weakness, light-headedness, numbness and headaches.   Psychiatric/Behavioral: Negative for agitation and behavioral problems.     Objective:     Vital Signs (Most Recent):  Temp: 98.9 °F (37.2 °C) (10/07/20 0800)  Pulse: 69 (10/07/20 1300)  Resp: (!) 22 (10/07/20 1300)  BP: 124/62 (10/07/20 1240)  SpO2: 95 % (10/07/20 1300) Vital Signs (24h Range):  Temp:  [97.5 °F (36.4 °C)-98.9 °F (37.2 °C)] 98.9 °F (37.2 °C)  Pulse:  [61-84] 69  Resp:  [16-22] 22  SpO2:  [93 %-98 %] 95 %  BP: (100-168)/(60-90) 124/62     Weight: 102.1 kg (225 lb)  Body mass index is 32.28 kg/m².    Physical Exam  Eyes:      Extraocular Movements: EOM normal.      Pupils: Pupils are equal, round, and reactive to light.   Neurological:      Mental Status: He is oriented to person, place, and time.      Coordination: Finger-Nose-Finger Test normal.      Deep Tendon Reflexes: Strength normal.   Psychiatric:         Speech: Speech normal.         NEUROLOGICAL EXAMINATION:     MENTAL STATUS   Oriented to person, place, and time.   Oriented to year and month.   Follows 2 step commands.   Attention: normal. Concentration: normal.   Speech: speech is normal   Level of consciousness: alert  Knowledge: good.   Normal comprehension.     CRANIAL NERVES     CN III, IV, VI   Pupils are equal, round, and reactive to light.  Extraocular motions are normal.   Nystagmus: none   Diplopia: none  Ophthalmoparesis: none    CN V   Facial sensation intact.     CN VII   Facial expression full, symmetric.     CN VIII   Hearing: intact    CN IX, X   Palate: symmetric    CN XI   CN XI normal.     CN XII   Tongue atrophy: Tongue laceration noted.    MOTOR EXAM   Muscle bulk: normal  Overall muscle tone: normal  Right arm pronator drift: absent  Left arm pronator drift: absent    Strength   Strength 5/5 throughout.     SENSORY EXAM   Right arm light touch: normal  Left arm light touch: normal  Right leg light touch: normal  Left  leg light touch: normal    GAIT AND COORDINATION     Gait  Gait: (deferred due to seizure precautions)     Coordination   Finger to nose coordination: normal    Tremor   Resting tremor: absent    Significant Labs:   Hemoglobin A1c:   Recent Labs   Lab 10/07/20  0434   HGBA1C 5.5     CBC:   Recent Labs   Lab 10/06/20  2146 10/07/20  0434 10/07/20  1109   WBC 14.71* 14.74* 12.85*   HGB 15.0 14.7 15.1   HCT 43.1 42.0 43.9    255 261     CMP:   Recent Labs   Lab 10/06/20  2146 10/07/20  0434 10/07/20  1109   * 125* 116*   * 134* 136   K 3.5 3.1* 3.1*   CL 95 97 99   CO2 24 21* 25   BUN 7* 6* 5*   CREATININE 0.9 0.8 0.8   CALCIUM 9.0 8.5* 8.5*   MG  --  2.1  --    PROT 8.0 7.6 7.6   ALBUMIN 3.9 3.7 3.7   BILITOT 1.1* 1.3* 1.5*   ALKPHOS 94 91 90   AST 33 39 37   ALT 21 20 19   ANIONGAP 12 16 12   EGFRNONAA >60.0 >60.0 >60.0     Urine Culture: No results for input(s): LABURIN in the last 48 hours.  Urine Studies:   Recent Labs   Lab 10/06/20  2131   COLORU Yellow   APPEARANCEUA Clear   PHUR 6.0   SPECGRAV 1.010   PROTEINUA Negative   GLUCUA Negative   KETONESU Negative   BILIRUBINUA Negative   OCCULTUA Negative   NITRITE Negative   LEUKOCYTESUR Negative     All pertinent lab results from the past 24 hours have been reviewed.    Significant Imaging: I have reviewed and interpreted all pertinent imaging results/findings within the past 24 hours.     CT Head WO contrast (10/6/20):  Chronic intracranial changes are noted, there is no evidence for superimposed acute intracranial process.    MRI Brain W WO contrast (epilepsy) 10/7/20: in process     Assessment and Plan:     Convulsion  75 y.o. male with HTN and alcohol abuse presented to ED 10/6 after LOC episodes at home. Wife found pt lying on the floor of the garage with abnormally loud respirations and decreased awareness both times. In the ED, he was witnessed to have a GTC lasting 10-15 seconds with tongue biting. Was given total of 2 g IV Keppra then  started on 500 mg BID. CT head WO contrast unremarkable for acute intracranial pathology. Labs with leukocytosis and hyponatremia (131). No previous hx of seizures or similar episodes. Pt and wife did say he drank less alcohol yesterday (~4 beers all day), but denies recent cut back in alcohol consumption.     >>Suspect cluster of seizures (3 within 24 hr period) was provoked in the setting of hyponatremia likely due to chronic alcohol abuse  No further seizure events 10/7, but wife says still not at cognitive baseline    Recommendations:  --would hold Keppra for now and hook up to EEG x 1 hr  --MRI brain W WO contrast (epilepsy protocol) pending  --continue seizure precautions  --CIWA protocol per primary  --continue seizure precautions    Alcohol abuse  Reports drinking 10-12  12 oz beers daily     --check PETH level  --alcohol cessation counseling provided  --CIWA protocol per primary    VTE Risk Mitigation (From admission, onward)         Ordered     heparin (porcine) injection 5,000 Units  Every 8 hours      10/07/20 0109     IP VTE HIGH RISK PATIENT  Once      10/07/20 0109     Place sequential compression device  Until discontinued      10/07/20 0109              Thank you for your consult. I will follow-up with patient. Please contact us if you have any additional questions.    Esperanza Vasquez PA-C  General Neurology Consult  Neuro Consult [facility] # [id number]

## 2020-10-07 NOTE — SUBJECTIVE & OBJECTIVE
Past Medical History:   Diagnosis Date    Alcohol abuse     Arthritis     Convulsion 10/7/2020    GERD (gastroesophageal reflux disease)     Glaucoma     Hypertension      Past Surgical History:   Procedure Laterality Date    KNEE CARTILAGE SURGERY Right      Review of patient's allergies indicates:  No Known Allergies    Current Neurological Medications:   Keppra 500 mg BID    No current facility-administered medications on file prior to encounter.      Current Outpatient Medications on File Prior to Encounter   Medication Sig    amlodipine (NORVASC) 10 MG tablet Take 10 mg by mouth once daily.    aspirin (ECOTRIN) 81 MG EC tablet Take 81 mg by mouth once daily.    furosemide (LASIX) 40 MG tablet Take 40 mg by mouth 2 (two) times a day.    latanoprost 0.005 % ophthalmic solution 1 drop once daily.    metoprolol tartrate (LOPRESSOR) 100 MG tablet Take 100 mg by mouth 2 (two) times daily.    omeprazole (PRILOSEC) 40 MG capsule Take 1 capsule (40 mg total) by mouth once daily.    [DISCONTINUED] hydrochlorothiazide (MICROZIDE) 12.5 mg capsule Take 12.5 mg by mouth once daily.     Family History     Problem Relation (Age of Onset)    Hypertension Mother, Brother        Tobacco Use    Smoking status: Never Smoker    Smokeless tobacco: Never Used   Substance and Sexual Activity    Alcohol use: Yes     Alcohol/week: 70.0 standard drinks     Types: 70 Standard drinks or equivalent per week     Comment: drinks between 8-10 beers daily    Drug use: No    Sexual activity: Yes     Partners: Female     Review of Systems   Constitutional: Positive for activity change and fatigue. Negative for fever.   HENT: Negative for trouble swallowing and voice change.    Eyes: Negative for visual disturbance.   Respiratory: Negative for shortness of breath.    Cardiovascular: Negative for chest pain.   Gastrointestinal: Negative for nausea and vomiting.   Musculoskeletal: Positive for arthralgias. Negative for gait problem  and neck stiffness.   Neurological: Positive for seizures. Negative for dizziness, tremors, facial asymmetry, speech difficulty, weakness, light-headedness, numbness and headaches.   Psychiatric/Behavioral: Negative for agitation and behavioral problems.     Objective:     Vital Signs (Most Recent):  Temp: 98.9 °F (37.2 °C) (10/07/20 0800)  Pulse: 69 (10/07/20 1300)  Resp: (!) 22 (10/07/20 1300)  BP: 124/62 (10/07/20 1240)  SpO2: 95 % (10/07/20 1300) Vital Signs (24h Range):  Temp:  [97.5 °F (36.4 °C)-98.9 °F (37.2 °C)] 98.9 °F (37.2 °C)  Pulse:  [61-84] 69  Resp:  [16-22] 22  SpO2:  [93 %-98 %] 95 %  BP: (100-168)/(60-90) 124/62     Weight: 102.1 kg (225 lb)  Body mass index is 32.28 kg/m².    Physical Exam  Eyes:      Extraocular Movements: EOM normal.      Pupils: Pupils are equal, round, and reactive to light.   Neurological:      Mental Status: He is oriented to person, place, and time.      Coordination: Finger-Nose-Finger Test normal.      Deep Tendon Reflexes: Strength normal.   Psychiatric:         Speech: Speech normal.         NEUROLOGICAL EXAMINATION:     MENTAL STATUS   Oriented to person, place, and time.   Oriented to year and month.   Follows 2 step commands.   Attention: normal. Concentration: normal.   Speech: speech is normal   Level of consciousness: alert  Knowledge: good.   Normal comprehension.     CRANIAL NERVES     CN III, IV, VI   Pupils are equal, round, and reactive to light.  Extraocular motions are normal.   Nystagmus: none   Diplopia: none  Ophthalmoparesis: none    CN V   Facial sensation intact.     CN VII   Facial expression full, symmetric.     CN VIII   Hearing: intact    CN IX, X   Palate: symmetric    CN XI   CN XI normal.     CN XII   Tongue atrophy: Tongue laceration noted.    MOTOR EXAM   Muscle bulk: normal  Overall muscle tone: normal  Right arm pronator drift: absent  Left arm pronator drift: absent    Strength   Strength 5/5 throughout.     SENSORY EXAM   Right arm light  touch: normal  Left arm light touch: normal  Right leg light touch: normal  Left leg light touch: normal    GAIT AND COORDINATION     Gait  Gait: (deferred due to seizure precautions)     Coordination   Finger to nose coordination: normal    Tremor   Resting tremor: absent    Significant Labs:   Hemoglobin A1c:   Recent Labs   Lab 10/07/20  0434   HGBA1C 5.5     CBC:   Recent Labs   Lab 10/06/20  2146 10/07/20  0434 10/07/20  1109   WBC 14.71* 14.74* 12.85*   HGB 15.0 14.7 15.1   HCT 43.1 42.0 43.9    255 261     CMP:   Recent Labs   Lab 10/06/20  2146 10/07/20  0434 10/07/20  1109   * 125* 116*   * 134* 136   K 3.5 3.1* 3.1*   CL 95 97 99   CO2 24 21* 25   BUN 7* 6* 5*   CREATININE 0.9 0.8 0.8   CALCIUM 9.0 8.5* 8.5*   MG  --  2.1  --    PROT 8.0 7.6 7.6   ALBUMIN 3.9 3.7 3.7   BILITOT 1.1* 1.3* 1.5*   ALKPHOS 94 91 90   AST 33 39 37   ALT 21 20 19   ANIONGAP 12 16 12   EGFRNONAA >60.0 >60.0 >60.0     Urine Culture: No results for input(s): LABURIN in the last 48 hours.  Urine Studies:   Recent Labs   Lab 10/06/20  2131   COLORU Yellow   APPEARANCEUA Clear   PHUR 6.0   SPECGRAV 1.010   PROTEINUA Negative   GLUCUA Negative   KETONESU Negative   BILIRUBINUA Negative   OCCULTUA Negative   NITRITE Negative   LEUKOCYTESUR Negative     All pertinent lab results from the past 24 hours have been reviewed.    Significant Imaging: I have reviewed and interpreted all pertinent imaging results/findings within the past 24 hours.     CT Head WO contrast (10/6/20):  Chronic intracranial changes are noted, there is no evidence for superimposed acute intracranial process.    MRI Brain W WO contrast (epilepsy) 10/7/20: in process

## 2020-10-07 NOTE — PROGRESS NOTES
Pharmacokinetic Initial Assessment: IV Vancomycin    Assessment/Plan:    Initiate intravenous vancomycin with loading dose of 2000 mg once followed by a maintenance dose of vancomycin 1500mg IV every 12 hours  Desired empiric serum trough concentration is 10 to 20 mcg/mL  Draw vancomycin trough level 60 min prior to fourth dose on 10/9 at approximately 0100  Pharmacy will continue to follow and monitor vancomycin.      Please contact pharmacy at extension [phone] with any questions regarding this assessment.     Thank you for the consult,   Jayce Richeyong       Patient brief summary:  Roshan Rankin is a 75 y.o. male initiated on antimicrobial therapy with IV Vancomycin for treatment of suspected skin & soft tissue infection    Drug Allergies:   Review of patient's allergies indicates:  No Known Allergies    Actual Body Weight:   102.1kg    Renal Function:   Estimated Creatinine Clearance: 95.5 mL/min (based on SCr of 0.8 mg/dL).,     Dialysis Method (if applicable):  N/A    CBC (last 72 hours):  Recent Labs   Lab Result Units 10/06/20  2146 10/07/20  0434 10/07/20  1109   WBC K/uL 14.71* 14.74* 12.85*   Hemoglobin g/dL 15.0 14.7 15.1   Hemoglobin A1C %  --  5.5  --    Hematocrit % 43.1 42.0 43.9   Platelets K/uL 271 255 261   Gran% % 83.1* 80.2* 78.2*   Lymph% % 6.4* 8.7* 7.8*   Mono% % 9.7 10.4 12.8   Eosinophil% % 0.1 0.0 0.2   Basophil% % 0.3 0.2 0.3   Differential Method  Automated Automated Automated       Metabolic Panel (last 72 hours):  Recent Labs   Lab Result Units 10/06/20  2131 10/06/20  2146 10/07/20  0434 10/07/20  1109   Sodium mmol/L  --  131* 134* 136   Potassium mmol/L  --  3.5 3.1* 3.1*   Chloride mmol/L  --  95 97 99   CO2 mmol/L  --  24 21* 25   Glucose mg/dL  --  164* 125* 116*   Glucose, UA  Negative  --   --   --    BUN, Bld mg/dL  --  7* 6* 5*   Creatinine mg/dL  --  0.9 0.8 0.8   Albumin g/dL  --  3.9 3.7 3.7   Total Bilirubin mg/dL  --  1.1* 1.3* 1.5*   Alkaline Phosphatase U/L  --  94 91 90    AST U/L  --  33 39 37   ALT U/L  --  21 20 19   Magnesium mg/dL  --   --  2.1  --    Phosphorus mg/dL  --   --  3.0  --        Drug levels (last 3 results):  No results for input(s): VANCOMYCINRA, VANCOMYCINPE, VANCOMYCINTR in the last 72 hours.    Microbiologic Results:  Microbiology Results (last 7 days)     Procedure Component Value Units Date/Time    Blood culture [[id number]] Collected: 10/07/20 1110    Order Status: Sent Specimen: Blood from Antecubital, Right Arm Updated: 10/07/20 1204    Blood culture [[id number]] Collected: 10/07/20 1110    Order Status: Sent Specimen: Blood from Antecubital, Right Hand Updated: 10/07/20 1204

## 2020-10-07 NOTE — HPI
"Roshan Rankin is a 75 y.o. male with a significant medical history of HTN, ETOH abuse (10-12 beers every day) who presents to the hospital complaining of syncopal episode.  HPI information gathered from review of the patient's medical record due to the patient being unaware of preceding events.  The patient's wife was inside the house when she heard a thud in the garage.  She went out and found the patient on his chest w/his eye closed and labored breathing.  She denies tremors or tonic clonic seizure like activity.  No bowel or urinary incontinence was reported.  The patient had blood in his mouth indicating he may have bitten his tongue.  He was not responsive to his wife's questions so she called 911.  The patient was brought to the ED for evaluation.  There was no reported history of seizures.   Shortly after arrival to the ED the patient began to return to baseline.  He did not recall falling or if he had any preceding symptoms.  The patient denied CP, SOB, HA, change in vision, dizziness, or N/V.  ED work up included labs that revealed a WBC 14.71, Na 131.  Other labs grossly normal.  Currently the patient is AA&O x3.  He is c/o right sided chest pain that he describes as "like I pulled a muscle".  He has no other complaints at this time.  Per his nurse, the patient's speed to respond has improved since his arrival in his room.  The patient is admitted to Hospital Medicine.  "

## 2020-10-07 NOTE — PLAN OF CARE
10/07/20 1712   Post-Acute Status   Post-Acute Authorization Other   Other Status No Post-Acute Service Needs   Discharge Delays None known at this time   Discharge Plan   Discharge Plan A Home with family   Discharge Plan B Home with family;Home Health     CM to follow for discharge planning needs.  OREN ZavalaN RN  Case Management  [phone]

## 2020-10-07 NOTE — ASSESSMENT & PLAN NOTE
"-Patient c/o right sided chest pain that he describes as "like I pulled a muscle".  Not reproducible on exam.  -Troponin and EKG pending    "

## 2020-10-07 NOTE — ED TRIAGE NOTES
Pt presents after having 2 falls at home per wife. Pt become unresponsive and not following commands after second fall. Pt now awake, alert, and oriented at this time. +ETOH. Pt states on blood thinners but does not know what. Pt does not remember fall. Denies headache and vision change.

## 2020-10-07 NOTE — H&P
"[facility] Medicine  History & Physical    Patient Name: Roshan Rankin  MRN: [id number]  Admission Date: 10/6/2020  Attending Physician: Dallas Mayorga MD   Primary Care Provider: Filiberto Strong MD (Inactive)         Patient information was obtained from patient, past medical records and ER records.     Subjective:     Principal Problem:Syncope    Chief Complaint:   Chief Complaint   Patient presents with    Fall     pt with 2nd fall today. Altered mental status after 2nd fall, +ETOH, +blood thinners per wife. Abrasion to side of face.        HPI: Roshan Rankin is a 75 y.o. male with a significant medical history of HTN, ETOH abuse (10-12 beers every day) who presents to the hospital complaining of syncopal episode.  HPI information gathered from review of the patient's medical record due to the patient being unaware of preceding events.  The patient's wife was inside the house when she heard a thud in the garage.  She went out and found the patient on his chest w/his eye closed and labored breathing.  She denies tremors or tonic clonic seizure like activity.  No bowel or urinary incontinence was reported.  The patient had blood in his mouth indicating he may have bitten his tongue.  He was not responsive to his wife's questions so she called 911.  The patient was brought to the ED for evaluation.  There was no reported history of seizures.   Shortly after arrival to the ED the patient began to return to baseline.  He did not recall falling or if he had any preceding symptoms.  The patient denied CP, SOB, HA, change in vision, dizziness, or N/V.  ED work up included labs that revealed a WBC 14.71, Na 131.  Other labs grossly normal.  Currently the patient is AA&O x3.  He is c/o right sided chest pain that he describes as "like I pulled a muscle".  He has no other complaints at this time.  Per his nurse, the patient's speed to respond has improved since his arrival in his room.  " The patient is admitted to Hospital Medicine.               Past Medical History:   Diagnosis Date    Alcohol abuse     Arthritis     Convulsion 10/7/2020    GERD (gastroesophageal reflux disease)     Glaucoma     Hypertension        Past Surgical History:   Procedure Laterality Date    KNEE CARTILAGE SURGERY Right        Review of patient's allergies indicates:  No Known Allergies    No current facility-administered medications on file prior to encounter.      Current Outpatient Medications on File Prior to Encounter   Medication Sig    amlodipine (NORVASC) 10 MG tablet Take 10 mg by mouth once daily.    aspirin (ECOTRIN) 81 MG EC tablet Take 81 mg by mouth once daily.    furosemide (LASIX) 40 MG tablet Take 40 mg by mouth 2 (two) times a day.    hydrochlorothiazide (MICROZIDE) 12.5 mg capsule Take 12.5 mg by mouth once daily.    latanoprost 0.005 % ophthalmic solution 1 drop once daily.    metoprolol tartrate (LOPRESSOR) 100 MG tablet Take 100 mg by mouth 2 (two) times daily.    omeprazole (PRILOSEC) 40 MG capsule Take 1 capsule (40 mg total) by mouth once daily.     Family History     Problem Relation (Age of Onset)    Hypertension Mother, Brother        Tobacco Use    Smoking status: Never Smoker    Smokeless tobacco: Never Used   Substance and Sexual Activity    Alcohol use: Yes     Alcohol/week: 70.0 standard drinks     Types: 70 Standard drinks or equivalent per week     Comment: drinks between 8-10 beers daily    Drug use: No    Sexual activity: Yes     Partners: Female     Review of Systems   Unable to perform ROS: Other   Constitutional: Negative for fever.   HENT: Negative for drooling.    Eyes: Negative for discharge and redness.   Respiratory: Negative for cough and shortness of breath.    Cardiovascular: Positive for chest pain (right sided, muscular).   Gastrointestinal: Negative for diarrhea and vomiting.   Genitourinary: Negative for hematuria.   Musculoskeletal: Negative for neck  stiffness.   Skin: Negative for rash.   Allergic/Immunologic: Negative for environmental allergies and food allergies.   Neurological: Positive for speech difficulty and weakness (generalized).     Objective:     Vital Signs (Most Recent):  Temp: 98.4 °F (36.9 °C) (10/07/20 0200)  Pulse: 84 (10/07/20 0200)  Resp: 16 (10/07/20 0200)  BP: (!) 159/72 (10/07/20 0200)  SpO2: 95 % (10/07/20 0200) Vital Signs (24h Range):  Temp:  [97.5 °F (36.4 °C)-98.4 °F (36.9 °C)] 98.4 °F (36.9 °C)  Pulse:  [61-84] 84  Resp:  [16-21] 16  SpO2:  [95 %-98 %] 95 %  BP: (100-168)/(60-90) 159/72     Weight: 102.1 kg (225 lb)  Body mass index is 32.28 kg/m².    Physical Exam  Vitals signs and nursing note reviewed.   Constitutional:       General: He is not in acute distress.     Appearance: He is well-developed. He is ill-appearing. He is not diaphoretic.   HENT:      Head: Normocephalic and atraumatic.      Right Ear: External ear normal.      Left Ear: External ear normal.      Nose: Nose normal.      Mouth/Throat:      Mouth: Mucous membranes are moist.   Eyes:      General: No scleral icterus.        Right eye: No discharge.         Left eye: No discharge.      Extraocular Movements: Extraocular movements intact.      Conjunctiva/sclera: Conjunctivae normal.      Pupils: Pupils are equal, round, and reactive to light.   Neck:      Musculoskeletal: Normal range of motion and neck supple.   Cardiovascular:      Rate and Rhythm: Normal rate and regular rhythm.      Heart sounds: Normal heart sounds. No murmur.   Pulmonary:      Effort: Pulmonary effort is normal. No respiratory distress.      Breath sounds: Normal breath sounds.   Chest:      Chest wall: No mass, deformity, swelling, tenderness, crepitus or edema.   Abdominal:      General: Bowel sounds are normal. There is no distension.      Palpations: Abdomen is soft.      Tenderness: There is no abdominal tenderness.   Musculoskeletal:      Right lower leg: No edema.      Left lower  leg: No edema.   Skin:     General: Skin is warm and dry.      Capillary Refill: Capillary refill takes less than 2 seconds.   Neurological:      Mental Status: He is alert and oriented to person, place, and time.           CRANIAL NERVES     CN III, IV, VI   Pupils are equal, round, and reactive to light.       Significant Labs:   A1C: No results for input(s): HGBA1C in the last 4320 hours.  BMP:   Recent Labs   Lab 10/06/20  2146   *   *   K 3.5   CL 95   CO2 24   BUN 7*   CREATININE 0.9   CALCIUM 9.0     CBC:   Recent Labs   Lab 10/06/20  2145 10/06/20  2146   WBC  --  14.71*   HGB  --  15.0   HCT 45 43.1   PLT  --  271     CMP:   Recent Labs   Lab 10/06/20  2146   *   K 3.5   CL 95   CO2 24   *   BUN 7*   CREATININE 0.9   CALCIUM 9.0   PROT 8.0   ALBUMIN 3.9   BILITOT 1.1*   ALKPHOS 94   AST 33   ALT 21   ANIONGAP 12   EGFRNONAA >60.0     Cardiac Markers:   Recent Labs   Lab 10/06/20  2146   BNP 97     Magnesium: No results for input(s): MG in the last 48 hours.  Troponin: No results for input(s): TROPONINI in the last 48 hours.  TSH: No results for input(s): TSH in the last 4320 hours.  Urine Studies:   Recent Labs   Lab 10/06/20  2131   COLORU Yellow   APPEARANCEUA Clear   PHUR 6.0   SPECGRAV 1.010   PROTEINUA Negative   GLUCUA Negative   KETONESU Negative   BILIRUBINUA Negative   OCCULTUA Negative   NITRITE Negative   LEUKOCYTESUR Negative     All pertinent labs within the past 24 hours have been reviewed.    Significant Imaging: I have reviewed all pertinent imaging results/findings within the past 24 hours.     Imaging Results          CT Head Without Contrast (Final result)  Result time 10/06/20 22:53:55    Final result by Shai Car MD (10/06/20 22:53:55)                 Impression:      Chronic intracranial changes are noted, there is no evidence for superimposed acute intracranial process.      Electronically signed by: Sahi  Joceline  Date:    10/06/2020  Time:    22:53             Narrative:    EXAMINATION:  CT HEAD WITHOUT CONTRAST    CLINICAL HISTORY:  Altered mental status;    TECHNIQUE:  Low dose axial images were obtained through the head.  Coronal and sagittal reformations were also performed. Contrast was not administered.    COMPARISON:  None.    FINDINGS:  The ventricular system, sulcal pattern and parenchymal attenuation characteristics are consistent with chronic change.  Involutional changes noted, chronic appearing white matter change is noted.  There is no evidence for intracranial mass, mass effect or midline shift and there is no evidence for acute intracranial hemorrhage.  Appropriate CSF spaces are seen at the skull base.    The visualized orbits appear intact.  The mastoid air cells appear appropriate when accounting for averaging, mild opacity along the external auditory canal on the left may relate to cerumen.  Paranasal sinuses appear appropriate when accounting for averaging.                                  Assessment/Plan:     * Syncope  Convulsion  Roshan Rankin is a 75 y.o. male with a significant medical history of HTN, ETOH abuse (10-12 beers every day) who presents to the hospital complaining of syncopal episode.  The patient was found down in his garage by his wife, unresponsive with labored respirations.  He slowly returned to baseline while in the ED. Prior to admission the patient had a witnessed episode of convulsion.  He was loaded with Keppra 1 gm.  -2D Echo  -Telemetry  -EEG  -Seizure precautions  -Keppra 500 mg BID  -Consult General Neurology, appreciate the consult and recs        Hyponatremia  -Na 131 on admission.  - cc/hr  -Monitor      Alcohol abuse  -Patient known to drink 10-12 beers/day.  Last ETOH just prior to syncopal episode, 4 cans today.  -Monitor for s/s withdrawal  -CIWA Q8h  -Primary team to be notified if CIWA>10  -Thiamine, Folate, MVI daily      Other chest pain  -Patient  "c/o right sided chest pain that he describes as "like I pulled a muscle".  Not reproducible on exam.  -Troponin and EKG pending      Essential hypertension  -Continue home Norvasc, HCTZ, metoprolol, and lasix  -Continuing home meds may compensate for s/s of ETOH withdrawals.  Other s/s such as tremors, diaphoresis, hallucinations, etc may be more reliable indicators.      Convulsion  -see syncope for full plan        VTE Risk Mitigation (From admission, onward)         Ordered     heparin (porcine) injection 5,000 Units  Every 8 hours      10/07/20 0109     IP VTE HIGH RISK PATIENT  Once      10/07/20 0109     Place sequential compression device  Until discontinued      10/07/20 0109                   Terri Verde DNP, NP  Department of Hospital Medicine   [facility]  "

## 2020-10-07 NOTE — SUBJECTIVE & OBJECTIVE
Past Medical History:   Diagnosis Date    Alcohol abuse     Arthritis     Convulsion 10/7/2020    GERD (gastroesophageal reflux disease)     Glaucoma     Hypertension        Past Surgical History:   Procedure Laterality Date    KNEE CARTILAGE SURGERY Right        Review of patient's allergies indicates:  No Known Allergies    No current facility-administered medications on file prior to encounter.      Current Outpatient Medications on File Prior to Encounter   Medication Sig    amlodipine (NORVASC) 10 MG tablet Take 10 mg by mouth once daily.    aspirin (ECOTRIN) 81 MG EC tablet Take 81 mg by mouth once daily.    furosemide (LASIX) 40 MG tablet Take 40 mg by mouth 2 (two) times a day.    hydrochlorothiazide (MICROZIDE) 12.5 mg capsule Take 12.5 mg by mouth once daily.    latanoprost 0.005 % ophthalmic solution 1 drop once daily.    metoprolol tartrate (LOPRESSOR) 100 MG tablet Take 100 mg by mouth 2 (two) times daily.    omeprazole (PRILOSEC) 40 MG capsule Take 1 capsule (40 mg total) by mouth once daily.     Family History     Problem Relation (Age of Onset)    Hypertension Mother, Brother        Tobacco Use    Smoking status: Never Smoker    Smokeless tobacco: Never Used   Substance and Sexual Activity    Alcohol use: Yes     Alcohol/week: 70.0 standard drinks     Types: 70 Standard drinks or equivalent per week     Comment: drinks between 8-10 beers daily    Drug use: No    Sexual activity: Yes     Partners: Female     Review of Systems   Unable to perform ROS: Other   Constitutional: Negative for fever.   HENT: Negative for drooling.    Eyes: Negative for discharge and redness.   Respiratory: Negative for cough and shortness of breath.    Cardiovascular: Positive for chest pain (right sided, muscular).   Gastrointestinal: Negative for diarrhea and vomiting.   Genitourinary: Negative for hematuria.   Musculoskeletal: Negative for neck stiffness.   Skin: Negative for rash.    Allergic/Immunologic: Negative for environmental allergies and food allergies.   Neurological: Positive for speech difficulty and weakness (generalized).     Objective:     Vital Signs (Most Recent):  Temp: 98.4 °F (36.9 °C) (10/07/20 0200)  Pulse: 84 (10/07/20 0200)  Resp: 16 (10/07/20 0200)  BP: (!) 159/72 (10/07/20 0200)  SpO2: 95 % (10/07/20 0200) Vital Signs (24h Range):  Temp:  [97.5 °F (36.4 °C)-98.4 °F (36.9 °C)] 98.4 °F (36.9 °C)  Pulse:  [61-84] 84  Resp:  [16-21] 16  SpO2:  [95 %-98 %] 95 %  BP: (100-168)/(60-90) 159/72     Weight: 102.1 kg (225 lb)  Body mass index is 32.28 kg/m².    Physical Exam  Vitals signs and nursing note reviewed.   Constitutional:       General: He is not in acute distress.     Appearance: He is well-developed. He is ill-appearing. He is not diaphoretic.   HENT:      Head: Normocephalic and atraumatic.      Right Ear: External ear normal.      Left Ear: External ear normal.      Nose: Nose normal.      Mouth/Throat:      Mouth: Mucous membranes are moist.   Eyes:      General: No scleral icterus.        Right eye: No discharge.         Left eye: No discharge.      Extraocular Movements: Extraocular movements intact.      Conjunctiva/sclera: Conjunctivae normal.      Pupils: Pupils are equal, round, and reactive to light.   Neck:      Musculoskeletal: Normal range of motion and neck supple.   Cardiovascular:      Rate and Rhythm: Normal rate and regular rhythm.      Heart sounds: Normal heart sounds. No murmur.   Pulmonary:      Effort: Pulmonary effort is normal. No respiratory distress.      Breath sounds: Normal breath sounds.   Chest:      Chest wall: No mass, deformity, swelling, tenderness, crepitus or edema.   Abdominal:      General: Bowel sounds are normal. There is no distension.      Palpations: Abdomen is soft.      Tenderness: There is no abdominal tenderness.   Musculoskeletal:      Right lower leg: No edema.      Left lower leg: No edema.   Skin:     General: Skin  is warm and dry.      Capillary Refill: Capillary refill takes less than 2 seconds.   Neurological:      Mental Status: He is alert and oriented to person, place, and time.           CRANIAL NERVES     CN III, IV, VI   Pupils are equal, round, and reactive to light.       Significant Labs:   A1C: No results for input(s): HGBA1C in the last 4320 hours.  BMP:   Recent Labs   Lab 10/06/20  2146   *   *   K 3.5   CL 95   CO2 24   BUN 7*   CREATININE 0.9   CALCIUM 9.0     CBC:   Recent Labs   Lab 10/06/20  2145 10/06/20  2146   WBC  --  14.71*   HGB  --  15.0   HCT 45 43.1   PLT  --  271     CMP:   Recent Labs   Lab 10/06/20  2146   *   K 3.5   CL 95   CO2 24   *   BUN 7*   CREATININE 0.9   CALCIUM 9.0   PROT 8.0   ALBUMIN 3.9   BILITOT 1.1*   ALKPHOS 94   AST 33   ALT 21   ANIONGAP 12   EGFRNONAA >60.0     Cardiac Markers:   Recent Labs   Lab 10/06/20  2146   BNP 97     Magnesium: No results for input(s): MG in the last 48 hours.  Troponin: No results for input(s): TROPONINI in the last 48 hours.  TSH: No results for input(s): TSH in the last 4320 hours.  Urine Studies:   Recent Labs   Lab 10/06/20  2131   COLORU Yellow   APPEARANCEUA Clear   PHUR 6.0   SPECGRAV 1.010   PROTEINUA Negative   GLUCUA Negative   KETONESU Negative   BILIRUBINUA Negative   OCCULTUA Negative   NITRITE Negative   LEUKOCYTESUR Negative     All pertinent labs within the past 24 hours have been reviewed.    Significant Imaging: I have reviewed all pertinent imaging results/findings within the past 24 hours.     Imaging Results          CT Head Without Contrast (Final result)  Result time 10/06/20 22:53:55    Final result by Shai Car MD (10/06/20 22:53:55)                 Impression:      Chronic intracranial changes are noted, there is no evidence for superimposed acute intracranial process.      Electronically signed by: Shai Car  Date:    10/06/2020  Time:    22:53             Narrative:     EXAMINATION:  CT HEAD WITHOUT CONTRAST    CLINICAL HISTORY:  Altered mental status;    TECHNIQUE:  Low dose axial images were obtained through the head.  Coronal and sagittal reformations were also performed. Contrast was not administered.    COMPARISON:  None.    FINDINGS:  The ventricular system, sulcal pattern and parenchymal attenuation characteristics are consistent with chronic change.  Involutional changes noted, chronic appearing white matter change is noted.  There is no evidence for intracranial mass, mass effect or midline shift and there is no evidence for acute intracranial hemorrhage.  Appropriate CSF spaces are seen at the skull base.    The visualized orbits appear intact.  The mastoid air cells appear appropriate when accounting for averaging, mild opacity along the external auditory canal on the left may relate to cerumen.  Paranasal sinuses appear appropriate when accounting for averaging.

## 2020-10-07 NOTE — ASSESSMENT & PLAN NOTE
Convulsion  Roshan Rankin is a 75 y.o. male with a significant medical history of HTN, ETOH abuse (10-12 beers every day) who presents to the hospital complaining of syncopal episode.  The patient was found down in his garage by his wife, unresponsive with labored respirations.  He slowly returned to baseline while in the ED. Prior to admission the patient had a witnessed episode of convulsion.  He was loaded with Keppra 1 gm.  -2D Echo  -Telemetry  -EEG  -Seizure precautions  -Keppra 500 mg BID  -Consult General Neurology, appreciate the consult and recs

## 2020-10-07 NOTE — ASSESSMENT & PLAN NOTE
-Patient known to drink 10-12 beers/day.  Last ETOH just prior to syncopal episode, 4 cans today.  -Monitor for s/s withdrawal  -CIWA Q8h  -Primary team to be notified if CIWA>10  -Thiamine, Folate, MVI daily

## 2020-10-07 NOTE — ED PROVIDER NOTES
Encounter Date: 10/6/2020       History     Chief Complaint   Patient presents with    Fall     pt with 2nd fall today. Altered mental status after 2nd fall, +ETOH, +blood thinners per wife. Abrasion to side of face.     Mr. Roshan Rankin is a 75 y.o. male with a PMHx of HTN who presents after an unwitnessed syncopal event at home. Patients wife was inside the house when she heard a thud in the garage. She went out and saw the patient on his chest with his eyes closed breathing heavy. She said he did not have any movements indicative of a tonic clonic seizures, no urinary or bowel incontinence, but he might have bite his tongue as he had some blood in his mouth. Denies any history of seizures. He was not able to respond to his wife's questions so she called 911. He has since returned almost back to baseline while in the ED. Patient does not remember any aspects of the fall or if he had any prodromal symptoms. He does report drinking 8-10 beers today. Nothing like this has every happened before and he denies any cardiac history. Denies any recent illness, chest pain, SOB, headaches, visual changes, or focal weakness.     After speaking with the patients wife she states that the patient drinks 10-12 beers every day. Today he only had about 4 drinks before his syncopal event. He has not gone multiple days without drinking for a while, but when he was hospitalize a couple years ago he did not have any issues with alcohol withdrawal.        History is mildly limited as patient is amnestic to event today.    Review of patient's allergies indicates:  No Known Allergies  Past Medical History:   Diagnosis Date    Alcohol abuse     Arthritis     Convulsion 10/7/2020    GERD (gastroesophageal reflux disease)     Glaucoma     Hypertension      Past Surgical History:   Procedure Laterality Date    KNEE CARTILAGE SURGERY Right      Family History   Problem Relation Age of Onset    Hypertension Mother     Hypertension  Brother      Social History     Tobacco Use    Smoking status: Never Smoker    Smokeless tobacco: Never Used   Substance Use Topics    Alcohol use: Yes     Alcohol/week: 70.0 standard drinks     Types: 70 Standard drinks or equivalent per week     Comment: drinks between 8-10 beers daily    Drug use: No     Review of Systems   Constitutional: Negative for chills and fever.   HENT: Negative for congestion and sore throat.    Eyes: Negative for pain.   Respiratory: Negative for cough and shortness of breath.    Cardiovascular: Positive for leg swelling (bilateral). Negative for chest pain and palpitations.   Gastrointestinal: Negative for abdominal pain, constipation, diarrhea, nausea and vomiting.   Genitourinary: Negative for difficulty urinating, dysuria and hematuria.   Musculoskeletal: Negative for back pain.   Skin: Negative for rash.   Neurological: Positive for syncope. Negative for light-headedness and headaches.   Hematological: Does not bruise/bleed easily.   Psychiatric/Behavioral: Negative for agitation.       Physical Exam     Initial Vitals [10/06/20 2021]   BP Pulse Resp Temp SpO2   100/60 68 18 97.5 °F (36.4 °C) 97 %      MAP       --         Physical Exam    Nursing note and vitals reviewed.  Constitutional: He appears well-developed and well-nourished. He is not diaphoretic. No distress.   HENT:   Head: Normocephalic and atraumatic.   Right Ear: External ear normal.   Left Ear: External ear normal.   Mouth/Throat: Lacerations (left side of tongue; non bleeding) present.   No signs of trauma   Eyes: EOM are normal. Pupils are equal, round, and reactive to light.   No nystagmus   Neck: Normal range of motion. Neck supple.   Cardiovascular: Normal rate, regular rhythm, normal heart sounds and intact distal pulses.   Pulmonary/Chest: Breath sounds normal. No respiratory distress. He has no wheezes. He has no rhonchi. He has no rales.   Abdominal: Soft. He exhibits no distension. There is no  abdominal tenderness. There is no rebound and no guarding.   Musculoskeletal: Normal range of motion.   Neurological: He is alert and oriented to person, place, and time. He has normal strength. No cranial nerve deficit or sensory deficit. GCS score is 15. GCS eye subscore is 4. GCS verbal subscore is 5. GCS motor subscore is 6.   Skin: Skin is warm. Capillary refill takes less than 2 seconds. No rash noted.   Psychiatric: He has a normal mood and affect.   Somewhat inattentive and confused         ED Course   Procedures  Labs Reviewed   CBC W/ AUTO DIFFERENTIAL - Abnormal; Notable for the following components:       Result Value    WBC 14.71 (*)     Mean Corpuscular Hemoglobin 32.3 (*)     Gran # (ANC) 12.2 (*)     Immature Grans (Abs) 0.06 (*)     Lymph # 0.9 (*)     Mono # 1.4 (*)     Gran% 83.1 (*)     Lymph% 6.4 (*)     All other components within normal limits   COMPREHENSIVE METABOLIC PANEL - Abnormal; Notable for the following components:    Sodium 131 (*)     Glucose 164 (*)     BUN, Bld 7 (*)     Total Bilirubin 1.1 (*)     All other components within normal limits   ISTAT PROCEDURE - Abnormal; Notable for the following components:    POC Glucose 169 (*)     POC Sodium 132 (*)     POC Chloride 94 (*)     All other components within normal limits   ALCOHOL,MEDICAL (ETHANOL)   URINALYSIS, REFLEX TO URINE CULTURE    Narrative:     Specimen Source->Urine   PROTIME-INR   B-TYPE NATRIURETIC PEPTIDE   SARS-COV-2 RDRP GENE   ISTAT CHEM8        ECG Results          EKG 12-lead (Final result)  Result time 10/07/20 09:41:24    Final result by Interface, Lab In [facility] (10/07/20 09:41:24)                 Narrative:    Test Reason : R55,    Vent. Rate : 076 BPM     Atrial Rate : 076 BPM     P-R Int : 180 ms          QRS Dur : 094 ms      QT Int : 412 ms       P-R-T Axes : 033 -46 030 degrees     QTc Int : 463 ms    Normal sinus rhythm  Left anterior fascicular block  Abnormal ECG  When compared with ECG of 27-FEB-2016  23:23,  No significant change was found  Confirmed by ROBE GILL MD ([id number]) on 10/7/2020 9:41:10 AM    Referred By: [email or web address]   SELF           Confirmed By:ROBE GILL MD                            Imaging Results          CT Head Without Contrast (Final result)  Result time 10/06/20 22:53:55    Final result by Shai Car MD (10/06/20 22:53:55)                 Impression:      Chronic intracranial changes are noted, there is no evidence for superimposed acute intracranial process.      Electronically signed by: Shai Car  Date:    10/06/2020  Time:    22:53             Narrative:    EXAMINATION:  CT HEAD WITHOUT CONTRAST    CLINICAL HISTORY:  Altered mental status;    TECHNIQUE:  Low dose axial images were obtained through the head.  Coronal and sagittal reformations were also performed. Contrast was not administered.    COMPARISON:  None.    FINDINGS:  The ventricular system, sulcal pattern and parenchymal attenuation characteristics are consistent with chronic change.  Involutional changes noted, chronic appearing white matter change is noted.  There is no evidence for intracranial mass, mass effect or midline shift and there is no evidence for acute intracranial hemorrhage.  Appropriate CSF spaces are seen at the skull base.    The visualized orbits appear intact.  The mastoid air cells appear appropriate when accounting for averaging, mild opacity along the external auditory canal on the left may relate to cerumen.  Paranasal sinuses appear appropriate when accounting for averaging.                                 Medical Decision Making:   History:   Old Medical Records: I decided to obtain old medical records.  Initial Assessment:   Patient is a 74yo M who presents with an unwitnessed syncopal event at home. No signs of head trauma on exam and neuro exam was benign.   Differential Diagnosis:   Seizure   Arrhythmia   Orthostatic hypertension   Vasovagal syncope   Independently Interpreted  Test(s):   I have ordered and independently interpreted X-rays - see summary below.       <> Summary of X-Ray Reading(s): NSR. No arrhythmia. No brugada or delta wave. No STEMI.   Clinical Tests:   Lab Tests: Ordered and Reviewed  Radiological Study: Ordered and Reviewed  Medical Tests: Ordered and Reviewed  ED Management:  U/A no signs of infection. WBC of 14.71. Istat revealed a Na of 132 glucose 169. ECG revealed normal sinus rhythm with a left anterior fascicular block, but no ST changes or t-wave inversions. CT head ordered. BNP normal. ETOH < 10.   Other:   I have discussed this case with another health care provider.       <> Summary of the Discussion: Hospital medicine       APC / Resident Notes:   9:30 PM  Patient had a generalized tonic seizure that lasted about 10-15 seconds. He has returned back to baseline. He did bite the side of his tongue and has a little bit of blood in his mouth. Patient started on 1g of IV keppra.     10:58 PM  CT head did not reveal any acute intracranial process.     12:21 AM  Patient is being admitted for further evaluation of his new onset seizures.          Attending Attestation:   Physician Attestation Statement for Resident:  As the supervising MD   Physician Attestation Statement: I have personally seen and examined this patient.   I agree with the above history. -:   As the supervising MD I agree with the above PE.    As the supervising MD I agree with the above treatment, course, plan, and disposition.   -:     Vitals normal.  Afebrile.  Here with unwitnessed syncopal episode.  He is somewhat amnestic to the event.  Has never happened before.  No chest pain, nausea, vomiting, shortness of breath, or diaphoresis associated.  Does not sound to be ACS on initial evaluation.  EKG without significant arrhythmia or other findings to suggest a cardiac etiology of syncope.  However given his general confusion associated, EKG will not suffice for workup.  CBC, CMP, ETOH level,  BMP, CT head obtained.  While awaiting labs, nursing staff reports patient had a 10-15 second episode of tonic activity that started on his right upper extremity.  It does not sound that there was colonic activity but it was generalized.  He was placed on non-rebreather face mask as he had some sonorous respirations afterward and was clearly postictal.  However he did not have significant secretions in his airway and was protecting aunt; no need for emergent intubation.  Despite having consumed reportedly 4 beers today, patient's alcohol level was 0.  Labs otherwise remarkable only for very mild hyponatremia at 131 (was 134 few years ago) and mild leukocytosis likely due to the seizure activity.  Loaded with 2 g of Keppra (20mg/kg).  CT head negative for ICH or obvious mass.  Do not believe this is alcohol withdrawal seizures as he was drinking alcohol today and blood pressure/heart rate are relatively stable.  Given new onset seizure with repeat seizure today, will need admission for General Neurology evaluation.  Discussed with hospital medicine who admitted patient.      I was personally present during the critical portions of the procedure(s) performed by the resident and was immediately available in the ED to provide services and assistance as needed during the entire procedure.  I have reviewed and agree with the residents interpretation of the following: lab data, CT scans and EKG.  I have reviewed the following: old records at this facility.        Attending Critical Care:   Critical Care Times:   ==============================================================  · Total Critical Care Time - exclusive of procedural time: 30 minutes.  ==============================================================  Critical care was necessary to treat or prevent imminent or life-threatening deterioration of the following conditions: status epilepticus.   The following critical care procedures were done by me (see procedure  notes): pulse oximetry and airway management.   Critical care was time spent personally by me on the following activities: obtaining history from patient or relative, examination of patient, review of old charts, ordering lab, x-rays, and/or EKG, development of treatment plan with patient or relative, ordering and performing treatments and interventions, evaluation of patient's response to treatment, discussion with consultants, interpretation of cardiac measurements and re-evaluation of patient's conition.   Critical Care Condition: potentially life-threatening                         Clinical Impression:       ICD-10-CM ICD-9-CM   1. New onset seizure  R56.9 780.39   2. Syncope  R55 780.2   3. Chest pain  R07.9 786.50   4. Syncope, unspecified syncope type  R55 780.2   5. Hyponatremia  E87.1 276.1   6. Leukocytosis, unspecified type  D72.829 288.60                          ED Disposition Condition    Observation                             Edwardo Sosa MD  Resident  10/07/20 0212       Vineet Hardin MD  10/08/20 4623

## 2020-10-07 NOTE — ED NOTES
I-STAT Chem-8+ Results:   Value Reference Range   Sodium 132 136-145 mmol/L   Potassium  3.5 3.5-5.1 mmol/L   Chloride 94  mmol/L   Ionized Calcium 1.08 1.06-1.42 mmol/L   CO2 (measured) 23 23-29 mmol/L   Glucose 169  mg/dL   BUN 6 6-30 mg/dL   Creatinine 0.8 0.5-1.4 mg/dL   Hematocrit 45 36-54%

## 2020-10-07 NOTE — NURSING
Pt arrived to the unit via stretcher accompanied by transport. No apparent distress assessed. Vital signs stable on room  Air. rr even and unlabored. Assessment performed. Skin intact w/ exception of laceration to tongue. Pt oriented to hospital and room. Pt updated on p.o.c. bed, low and locked srs up x 2 and padded. Call bell and urinal in reach. Seizure precautions in place. Pt verbalized understanding to call for assistance or if needing to get out of bed.

## 2020-10-07 NOTE — ASSESSMENT & PLAN NOTE
75 y.o. male with HTN and alcohol abuse presented to ED 10/6 after LOC episodes at home. Wife found pt lying on the floor of the garage with abnormally loud respirations and decreased awareness both times. In the ED, he was witnessed to have a GTC lasting 10-15 seconds with tongue biting. Was given total of 2 g IV Keppra then started on 500 mg BID. CT head WO contrast unremarkable for acute intracranial pathology. Labs with leukocytosis and hyponatremia (131). No previous hx of seizures or similar episodes. Pt and wife did say he drank less alcohol yesterday (~4 beers all day), but denies recent cut back in alcohol consumption.   >>Suspect cluster of seizures (3 within 24 hr period) was provoked in the setting of hyponatremia likely due to chronic alcohol abuse    Recommendations:  --would hold Keppra for now and hook up to EEG x 1 hr  --MRI brain W WO contrast (epilepsy protocol) pending  --continue seizure precautions  --CIWA protocol per primary  --continue seizure precautions

## 2020-10-08 PROBLEM — M25.561 KNEE PAIN, BILATERAL: Chronic | Status: ACTIVE | Noted: 2020-10-08

## 2020-10-08 PROBLEM — M25.562 KNEE PAIN, BILATERAL: Chronic | Status: ACTIVE | Noted: 2020-10-08

## 2020-10-08 PROBLEM — R56.9 NEW ONSET SEIZURE: Status: ACTIVE | Noted: 2020-10-08

## 2020-10-08 LAB
ALBUMIN SERPL BCP-MCNC: 3.3 G/DL (ref 3.5–5.2)
ALP SERPL-CCNC: 77 U/L (ref 55–135)
ALT SERPL W/O P-5'-P-CCNC: 16 U/L (ref 10–44)
ANION GAP SERPL CALC-SCNC: 12 MMOL/L (ref 8–16)
AST SERPL-CCNC: 36 U/L (ref 10–40)
BASOPHILS # BLD AUTO: 0.04 K/UL (ref 0–0.2)
BASOPHILS NFR BLD: 0.3 % (ref 0–1.9)
BILIRUB SERPL-MCNC: 1.7 MG/DL (ref 0.1–1)
BUN SERPL-MCNC: 6 MG/DL (ref 8–23)
CALCIUM SERPL-MCNC: 8.1 MG/DL (ref 8.7–10.5)
CHLORIDE SERPL-SCNC: 98 MMOL/L (ref 95–110)
CO2 SERPL-SCNC: 26 MMOL/L (ref 23–29)
CREAT SERPL-MCNC: 0.8 MG/DL (ref 0.5–1.4)
DIFFERENTIAL METHOD: ABNORMAL
EOSINOPHIL # BLD AUTO: 0 K/UL (ref 0–0.5)
EOSINOPHIL NFR BLD: 0.1 % (ref 0–8)
ERYTHROCYTE [DISTWIDTH] IN BLOOD BY AUTOMATED COUNT: 12.8 % (ref 11.5–14.5)
EST. GFR  (AFRICAN AMERICAN): >60 ML/MIN/1.73 M^2
EST. GFR  (NON AFRICAN AMERICAN): >60 ML/MIN/1.73 M^2
GLUCOSE SERPL-MCNC: 152 MG/DL (ref 70–110)
HCT VFR BLD AUTO: 41 % (ref 40–54)
HGB BLD-MCNC: 13.7 G/DL (ref 14–18)
IMM GRANULOCYTES # BLD AUTO: 0.07 K/UL (ref 0–0.04)
IMM GRANULOCYTES NFR BLD AUTO: 0.5 % (ref 0–0.5)
LYMPHOCYTES # BLD AUTO: 1.4 K/UL (ref 1–4.8)
LYMPHOCYTES NFR BLD: 10 % (ref 18–48)
MAGNESIUM SERPL-MCNC: 2 MG/DL (ref 1.6–2.6)
MCH RBC QN AUTO: 33 PG (ref 27–31)
MCHC RBC AUTO-ENTMCNC: 33.4 G/DL (ref 32–36)
MCV RBC AUTO: 99 FL (ref 82–98)
MONOCYTES # BLD AUTO: 2 K/UL (ref 0.3–1)
MONOCYTES NFR BLD: 14.5 % (ref 4–15)
NEUTROPHILS # BLD AUTO: 10.4 K/UL (ref 1.8–7.7)
NEUTROPHILS NFR BLD: 74.6 % (ref 38–73)
NRBC BLD-RTO: 0 /100 WBC
PHOSPHATE SERPL-MCNC: 2.4 MG/DL (ref 2.7–4.5)
PLATELET # BLD AUTO: 236 K/UL (ref 150–350)
PMV BLD AUTO: 11.4 FL (ref 9.2–12.9)
POTASSIUM SERPL-SCNC: 3.6 MMOL/L (ref 3.5–5.1)
PROT SERPL-MCNC: 7 G/DL (ref 6–8.4)
RBC # BLD AUTO: 4.15 M/UL (ref 4.6–6.2)
SODIUM SERPL-SCNC: 136 MMOL/L (ref 136–145)
WBC # BLD AUTO: 13.98 K/UL (ref 3.9–12.7)

## 2020-10-08 PROCEDURE — 95700 EEG CONT REC W/VID EEG TECH: CPT

## 2020-10-08 PROCEDURE — 80321 ALCOHOLS BIOMARKERS 1OR 2: CPT

## 2020-10-08 PROCEDURE — 63600175 PHARM REV CODE 636 W HCPCS: Performed by: INTERNAL MEDICINE

## 2020-10-08 PROCEDURE — 25000003 PHARM REV CODE 250: Performed by: PHYSICIAN ASSISTANT

## 2020-10-08 PROCEDURE — 95714 VEEG EA 12-26 HR UNMNTR: CPT

## 2020-10-08 PROCEDURE — 99233 PR SUBSEQUENT HOSPITAL CARE,LEVL III: ICD-10-PCS | Mod: ,,, | Performed by: PHYSICIAN ASSISTANT

## 2020-10-08 PROCEDURE — 96374 THER/PROPH/DIAG INJ IV PUSH: CPT | Mod: 59

## 2020-10-08 PROCEDURE — 95720 EEG PHY/QHP EA INCR W/VEEG: CPT | Mod: ,,, | Performed by: PSYCHIATRY & NEUROLOGY

## 2020-10-08 PROCEDURE — 96372 THER/PROPH/DIAG INJ SC/IM: CPT

## 2020-10-08 PROCEDURE — 99233 SBSQ HOSP IP/OBS HIGH 50: CPT | Mod: ,,, | Performed by: PSYCHIATRY & NEUROLOGY

## 2020-10-08 PROCEDURE — 25000003 PHARM REV CODE 250: Performed by: INTERNAL MEDICINE

## 2020-10-08 PROCEDURE — 63600175 PHARM REV CODE 636 W HCPCS: Performed by: PHYSICIAN ASSISTANT

## 2020-10-08 PROCEDURE — 25000003 PHARM REV CODE 250: Performed by: NURSE PRACTITIONER

## 2020-10-08 PROCEDURE — 11000001 HC ACUTE MED/SURG PRIVATE ROOM

## 2020-10-08 PROCEDURE — 83735 ASSAY OF MAGNESIUM: CPT

## 2020-10-08 PROCEDURE — 84100 ASSAY OF PHOSPHORUS: CPT

## 2020-10-08 PROCEDURE — 80053 COMPREHEN METABOLIC PANEL: CPT

## 2020-10-08 PROCEDURE — 36415 COLL VENOUS BLD VENIPUNCTURE: CPT

## 2020-10-08 PROCEDURE — 63600175 PHARM REV CODE 636 W HCPCS: Performed by: NURSE PRACTITIONER

## 2020-10-08 PROCEDURE — 85025 COMPLETE CBC W/AUTO DIFF WBC: CPT

## 2020-10-08 PROCEDURE — 96376 TX/PRO/DX INJ SAME DRUG ADON: CPT

## 2020-10-08 PROCEDURE — 99233 SBSQ HOSP IP/OBS HIGH 50: CPT | Mod: ,,, | Performed by: PHYSICIAN ASSISTANT

## 2020-10-08 PROCEDURE — 99233 PR SUBSEQUENT HOSPITAL CARE,LEVL III: ICD-10-PCS | Mod: ,,, | Performed by: PSYCHIATRY & NEUROLOGY

## 2020-10-08 PROCEDURE — 95720 PR EEG, W/VIDEO, CONT RECORD, I&R, >12<26 HRS: ICD-10-PCS | Mod: ,,, | Performed by: PSYCHIATRY & NEUROLOGY

## 2020-10-08 RX ORDER — DIAZEPAM 5 MG/1
10 TABLET ORAL 3 TIMES DAILY
Status: DISCONTINUED | OUTPATIENT
Start: 2020-10-08 | End: 2020-10-10

## 2020-10-08 RX ORDER — CEFTRIAXONE 1 G/1
1 INJECTION, POWDER, FOR SOLUTION INTRAMUSCULAR; INTRAVENOUS
Status: DISCONTINUED | OUTPATIENT
Start: 2020-10-08 | End: 2020-10-09

## 2020-10-08 RX ORDER — HYDROCODONE BITARTRATE AND ACETAMINOPHEN 5; 325 MG/1; MG/1
1 TABLET ORAL ONCE AS NEEDED
Status: DISCONTINUED | OUTPATIENT
Start: 2020-10-08 | End: 2020-10-15 | Stop reason: HOSPADM

## 2020-10-08 RX ORDER — CEFAZOLIN SODIUM 1 G/3ML
1 INJECTION, POWDER, FOR SOLUTION INTRAMUSCULAR; INTRAVENOUS
Status: CANCELLED | OUTPATIENT
Start: 2020-10-08

## 2020-10-08 RX ADMIN — ACETAMINOPHEN 650 MG: 325 TABLET ORAL at 02:10

## 2020-10-08 RX ADMIN — VANCOMYCIN HYDROCHLORIDE 1500 MG: 1.5 INJECTION, POWDER, LYOPHILIZED, FOR SOLUTION INTRAVENOUS at 03:10

## 2020-10-08 RX ADMIN — DIAZEPAM 5 MG: 5 TABLET ORAL at 02:10

## 2020-10-08 RX ADMIN — HEPARIN SODIUM 5000 UNITS: 5000 INJECTION INTRAVENOUS; SUBCUTANEOUS at 02:10

## 2020-10-08 RX ADMIN — METOPROLOL TARTRATE 100 MG: 100 TABLET ORAL at 08:10

## 2020-10-08 RX ADMIN — Medication 100 MG: at 08:10

## 2020-10-08 RX ADMIN — METOPROLOL TARTRATE 100 MG: 100 TABLET ORAL at 09:10

## 2020-10-08 RX ADMIN — POTASSIUM PHOSPHATE, MONOBASIC AND POTASSIUM PHOSPHATE, DIBASIC 15 MMOL: 224; 236 INJECTION, SOLUTION, CONCENTRATE INTRAVENOUS at 11:10

## 2020-10-08 RX ADMIN — THERA TABS 1 TABLET: TAB at 08:10

## 2020-10-08 RX ADMIN — CEFTRIAXONE SODIUM 1 G: 1 INJECTION, POWDER, FOR SOLUTION INTRAMUSCULAR; INTRAVENOUS at 08:10

## 2020-10-08 RX ADMIN — FUROSEMIDE 40 MG: 40 TABLET ORAL at 08:10

## 2020-10-08 RX ADMIN — DIAZEPAM 5 MG: 5 TABLET ORAL at 08:10

## 2020-10-08 RX ADMIN — AMLODIPINE BESYLATE 10 MG: 10 TABLET ORAL at 08:10

## 2020-10-08 RX ADMIN — DIAZEPAM 10 MG: 5 TABLET ORAL at 08:10

## 2020-10-08 RX ADMIN — VANCOMYCIN HYDROCHLORIDE 1500 MG: 1.5 INJECTION, POWDER, LYOPHILIZED, FOR SOLUTION INTRAVENOUS at 02:10

## 2020-10-08 RX ADMIN — FOLIC ACID 1 MG: 5 INJECTION, SOLUTION INTRAMUSCULAR; INTRAVENOUS; SUBCUTANEOUS at 09:10

## 2020-10-08 RX ADMIN — ASPIRIN 81 MG: 81 TABLET, COATED ORAL at 08:10

## 2020-10-08 RX ADMIN — ACETAMINOPHEN 650 MG: 325 TABLET ORAL at 08:10

## 2020-10-08 RX ADMIN — HEPARIN SODIUM 5000 UNITS: 5000 INJECTION INTRAVENOUS; SUBCUTANEOUS at 06:10

## 2020-10-08 NOTE — SUBJECTIVE & OBJECTIVE
Interval History: No acute events overnight. The patient was seen at the bedside this morning with his wife present. His wife reports that the pt was more disoriented this morning than yesterday. He repeatedly took his hospital gown off and was lying in bed exposed. They note significant improvement in warmth, erythema, and pain of LLE. He complains of bilateral knee pain today, but states that it is chronic. PT/OT consulted. MRI brain completed. 24h EEG in process. Will continue seizure precautions, holding keppra for now, and IV antibiotic therapy for LLE cellulitis.     Review of Systems   Constitutional: Positive for activity change. Negative for chills and fever.   Respiratory: Negative for chest tightness and shortness of breath.    Cardiovascular: Negative for chest pain and leg swelling.   Gastrointestinal: Negative for abdominal pain and nausea.   Musculoskeletal: Positive for arthralgias (chronic bilateral knee pain). Negative for joint swelling and neck stiffness.   Skin: Positive for color change and wound.   Neurological: Positive for tremors, seizures and syncope. Negative for dizziness, facial asymmetry, speech difficulty, weakness and light-headedness.   Psychiatric/Behavioral: Positive for confusion. The patient is not nervous/anxious.      Objective:     Vital Signs (Most Recent):  Temp: 100.3 °F (37.9 °C) (10/08/20 0800)  Pulse: 81 (10/08/20 0800)  Resp: 20 (10/08/20 0800)  BP: 138/63 (10/08/20 0800)  SpO2: 95 % (10/08/20 0400) Vital Signs (24h Range):  Temp:  [98.4 °F (36.9 °C)-100.3 °F (37.9 °C)] 100.3 °F (37.9 °C)  Pulse:  [77-81] 81  Resp:  [20-22] 20  SpO2:  [95 %] 95 %  BP: (138-141)/(63-82) 138/63     Weight: 102.1 kg (225 lb)  Body mass index is 32.28 kg/m².    Intake/Output Summary (Last 24 hours) at 10/8/2020 1419  Last data filed at 10/8/2020 0200  Gross per 24 hour   Intake 1750 ml   Output 1500 ml   Net 250 ml      Physical Exam  Vitals signs and nursing note reviewed.    Constitutional:       Appearance: He is well-developed.   Eyes:      General: No scleral icterus.     Extraocular Movements: Extraocular movements intact.   Cardiovascular:      Rate and Rhythm: Normal rate and regular rhythm.      Pulses: Normal pulses.   Pulmonary:      Effort: Pulmonary effort is normal.      Breath sounds: Normal breath sounds.   Abdominal:      General: Bowel sounds are normal.      Palpations: Abdomen is soft.      Tenderness: There is no abdominal tenderness. There is no guarding or rebound.   Musculoskeletal: Normal range of motion.         General: Swelling and tenderness present.      Comments: Swelling and erythema noted of the left lower extremity. No warmth or tenderness noted. Improving.    Pain with ROM of knees bilaterally. No bony tenderness, deformity, or swelling noted. Chronic according to patient, but more bothersome with prolonged rest.   Skin:     General: Skin is warm and dry.      Findings: Erythema present.   Neurological:      General: No focal deficit present.      Mental Status: He is alert.      Comments: Strength exam limited in lower extremity due to pain, but able to lift both legs against gravity spontaneously.  Oriented to person, year, president. Pt believes he is in [place].  Able to follow commands.          Significant Labs: All pertinent labs within the past 24 hours have been reviewed.    Significant Imaging: I have reviewed all pertinent imaging results/findings within the past 24 hours.

## 2020-10-08 NOTE — ASSESSMENT & PLAN NOTE
"-Patient c/o right sided chest pain that he describes as "like I pulled a muscle".  Not reproducible on exam.  -Troponin <0.006  - BNP 97  - EKG: no acute changes  "

## 2020-10-08 NOTE — ASSESSMENT & PLAN NOTE
Reports drinking 10-12 (12 oz) beers daily   PETH pending    --thiamine, folate and multivitamins qd  --valium 5 mg TID started for withdrawal ppx

## 2020-10-08 NOTE — ASSESSMENT & PLAN NOTE
-Patient known to drink 10-12 beers/day.  Last ETOH just prior to syncopal episode, 4 cans on day of admission  -Monitor for s/s withdrawal  -CIWA Q8h  -Primary team to be notified if CIWA>10  -Thiamine, Folate, MVI daily  - PETH pending  - started on diazepam 5mg TID for withdrawal ppx, increased to 10 mg tid on 10/8 in setting of fever and increased confusion/agitation. titrate as needed

## 2020-10-08 NOTE — ASSESSMENT & PLAN NOTE
Leukocytosis  - redness, warmth and tenderness noted to LLE; reported pain since scratching it at home x few days  - 1/4 SIRS w/ WBC 14; remains afebrile & HDS  - started on IV vanc  - transition to PO on d/c

## 2020-10-08 NOTE — PROGRESS NOTES
"[facility] Medicine  Progress Note    Patient Name: Roshan Rankin  MRN: [id number]  Patient Class: OP- Observation   Admission Date: 10/6/2020  Length of Stay: 0 days  Attending Physician: Dallas Mayorga MD  Primary Care Provider: Ben Brannon MD        Subjective:     Principal Problem:Convulsion        HPI:  Roshan Rankin is a 75 y.o. male with a significant medical history of HTN, ETOH abuse (10-12 beers every day) who presents to the hospital complaining of syncopal episode.  HPI information gathered from review of the patient's medical record due to the patient being unaware of preceding events.  The patient's wife was inside the house when she heard a thud in the garage.  She went out and found the patient on his chest w/his eye closed and labored breathing.  She denies tremors or tonic clonic seizure like activity.  No bowel or urinary incontinence was reported.  The patient had blood in his mouth indicating he may have bitten his tongue.  He was not responsive to his wife's questions so she called 911.  The patient was brought to the ED for evaluation.  There was no reported history of seizures.   Shortly after arrival to the ED the patient began to return to baseline.  He did not recall falling or if he had any preceding symptoms.  The patient denied CP, SOB, HA, change in vision, dizziness, or N/V.  ED work up included labs that revealed a WBC 14.71, Na 131.  Other labs grossly normal.  Currently the patient is AA&O x3.  He is c/o right sided chest pain that he describes as "like I pulled a muscle".  He has no other complaints at this time.  Per his nurse, the patient's speed to respond has improved since his arrival in his room.  The patient is admitted to Hospital Medicine.               Overview/Hospital Course:  Mr. Rankin was admitted to hospital medicine service for evaluation of possible syncopal episodes with convulsions. In the ED, he was witnessed to have a " generalized tonic clonic lasting 10-15 seconds with tongue biting. Was given total of 2 g IV Keppra then started on 500 mg BID. CT head WO contrast unremarkable for acute intracranial pathology. Labs with leukocytosis (14) and hyponatremia (131). Patient with notable LLE cellulitis on exam, started on IV vanc. Suspect cellulitis etiology of leukocytosis. No previous hx of seizures or similar episodes. Pt and wife did say he drank less alcohol yesterday (~4 beers all day), but denies recent cut back in alcohol consumption. Neuro consulted, suspect cluster of seizures (3 within 24 hr period) was provoked in the setting of hyponatremia likely due to chronic alcohol abuse. EEG x 1 hour ordered. MRI w w/o contrast pending. Continue alcohol withdrawal protocols.    Interval History: NAEON. Patient evaluated at bedside this AM. Patient without complaints this morning. Per wife, patient with increasing confusion & resting tremor for the past few months. Patient & wife deny further episodes of LOC/convulsions since admit. LLE notable fore cellulitis, will start IV abx/ plan for neuo to see pt today    Review of Systems   Constitutional: Positive for activity change. Negative for chills and fever.   Respiratory: Negative for chest tightness and shortness of breath.    Cardiovascular: Negative for chest pain and leg swelling.   Gastrointestinal: Negative for abdominal pain and nausea.   Skin: Positive for color change and wound.   Neurological: Positive for tremors, seizures and syncope. Negative for dizziness, facial asymmetry, speech difficulty, weakness and light-headedness.   Psychiatric/Behavioral: Positive for confusion. The patient is not nervous/anxious.      Objective:     Vital Signs (Most Recent):  Temp: 99.1 °F (37.3 °C) (10/07/20 2150)  Pulse: 77 (10/07/20 2150)  Resp: 20 (10/07/20 2150)  BP: (!) 140/68 (10/07/20 2150)  SpO2: 95 % (10/07/20 2150) Vital Signs (24h Range):  Temp:  [98.4 °F (36.9 °C)-99.1 °F (37.3  °C)] 99.1 °F (37.3 °C)  Pulse:  [62-84] 77  Resp:  [16-22] 20  SpO2:  [93 %-96 %] 95 %  BP: (124-159)/(62-72) 140/68     Weight: 102.1 kg (225 lb)  Body mass index is 32.28 kg/m².    Intake/Output Summary (Last 24 hours) at 10/7/2020 2328  Last data filed at 10/7/2020 1500  Gross per 24 hour   Intake 440 ml   Output 1500 ml   Net -1060 ml      Physical Exam  Vitals signs and nursing note reviewed.   Constitutional:       Appearance: He is well-developed.   Eyes:      General: No scleral icterus.     Extraocular Movements: Extraocular movements intact.   Cardiovascular:      Rate and Rhythm: Normal rate and regular rhythm.   Pulmonary:      Effort: Pulmonary effort is normal.      Breath sounds: Normal breath sounds.   Abdominal:      General: Bowel sounds are normal. There is distension.      Palpations: Abdomen is soft.      Tenderness: There is no abdominal tenderness. There is no guarding or rebound.   Musculoskeletal:         General: Swelling and tenderness present.      Comments: LLE cellulitis; see image below   Skin:     General: Skin is warm and dry.      Findings: Erythema present.   Neurological:      General: No focal deficit present.      Mental Status: He is alert and oriented to person, place, and time.   Psychiatric:         Behavior: Behavior normal.                 Significant Labs:   CBC:   Recent Labs   Lab 10/06/20  2146 10/07/20  0434 10/07/20  1109   WBC 14.71* 14.74* 12.85*   HGB 15.0 14.7 15.1   HCT 43.1 42.0 43.9    255 261     CMP:   Recent Labs   Lab 10/06/20  2146 10/07/20  0434 10/07/20  1109   * 134* 136   K 3.5 3.1* 3.1*   CL 95 97 99   CO2 24 21* 25   * 125* 116*   BUN 7* 6* 5*   CREATININE 0.9 0.8 0.8   CALCIUM 9.0 8.5* 8.5*   PROT 8.0 7.6 7.6   ALBUMIN 3.9 3.7 3.7   BILITOT 1.1* 1.3* 1.5*   ALKPHOS 94 91 90   AST 33 39 37   ALT 21 20 19   ANIONGAP 12 16 12   EGFRNONAA >60.0 >60.0 >60.0     All pertinent labs within the past 24 hours have been  "reviewed.    Significant Imaging: I have reviewed all pertinent imaging results/findings within the past 24 hours.      Assessment/Plan:      * Convulsion  Syncope  Roshan Rankin is a 75 y.o. male with a significant medical history of HTN, ETOH abuse (10-12 beers every day) who presents to the hospital complaining of syncopal episode.  The patient was found down in his garage by his wife, unresponsive with labored respirations.  He slowly returned to baseline while in the ED. Prior to admission the patient had a witnessed episode of convulsion.  He was loaded with Keppra 1 gm.  -2D Echo with normal systolic & diastolic function  - started on Keppra 500 mg BID  - EEG ordered  -  Neurology consulted, suspect cluster of seizures (3 within 24 hr period) was provoked in the setting of hyponatremia likely due to chronic alcohol abuse  - Keppra held for now and hook up to EEG x 1 hr  - MRI brain W WO contrast (epilepsy protocol) pending  - CIWA protocol as noted below  - Seizure/aspiration precautions, neuro checks q4h    Cellulitis of left lower extremity  Leukocytosis  - redness, warmth and tenderness noted to LLE; reported pain since scratching it at home x few days  - 1/4 SIRS w/ WBC 14; remains afebrile & HDS  - started on IV vanc  - transition to PO on d/c    Other chest pain  -Patient c/o right sided chest pain that he describes as "like I pulled a muscle".  Not reproducible on exam.  -Troponin and EKG pending      Syncope  Convulsion  Roshan Rankin is a 75 y.o. male with a significant medical history of HTN, ETOH abuse (10-12 beers every day) who presents to the hospital complaining of syncopal episode.  The patient was found down in his garage by his wife, unresponsive with labored respirations.  He slowly returned to baseline while in the ED. Prior to admission the patient had a witnessed episode of convulsion.  He was loaded with Keppra 1 gm.  -2D Echo  -Telemetry  -EEG  -Seizure precautions  -Keppra 500 mg " BID  -Consult General Neurology, appreciate the consult and recs        Hyponatremia  -Na 131 on admission.  - cc/hr  - improved to 133, continue to monitor    Alcohol abuse  -Patient known to drink 10-12 beers/day.  Last ETOH just prior to syncopal episode, 4 cans today.  -Monitor for s/s withdrawal  -CIWA Q8h  -Primary team to be notified if CIWA>10  -Thiamine, Folate, MVI daily  - PETH pending  - started on diazepam 5mg TID for withdrawal ppx, titrate as needed    Essential hypertension  -Continue home Norvasc, HCTZ, metoprolol, and lasix  -Continuing home meds may compensate for s/s of ETOH withdrawals.  Other s/s such as tremors, diaphoresis, hallucinations, etc may be more reliable indicators.      VTE Risk Mitigation (From admission, onward)         Ordered     heparin (porcine) injection 5,000 Units  Every 8 hours      10/07/20 0109     IP VTE HIGH RISK PATIENT  Once      10/07/20 0109     Place sequential compression device  Until discontinued      10/07/20 0109                Discharge Planning   SHANE: 10/9/2020     Code Status: Full Code   Is the patient medically ready for discharge?: No    Reason for patient still in hospital (select all that apply): Patient new problem, Patient trending condition, Laboratory test, Treatment and Imaging  Discharge Plan A: Home with family   Discharge Delays: None known at this time            Discussed with staff, Mukesh.  Loretta Robles PA-C  Department of Hospital Medicine   [facility]

## 2020-10-08 NOTE — ASSESSMENT & PLAN NOTE
-Patient known to drink 10-12 beers/day.  Last ETOH just prior to syncopal episode, 4 cans today.  -Monitor for s/s withdrawal  -CIWA Q8h  -Primary team to be notified if CIWA>10  -Thiamine, Folate, MVI daily  - PETH pending  - started on diazepam 5mg TID for withdrawal ppx, titrate as needed

## 2020-10-08 NOTE — ASSESSMENT & PLAN NOTE
Syncope    Roshan Rankin is a 75 y.o. male with a significant medical history of HTN, ETOH abuse (10-12 beers every day) who presents to the hospital complaining of syncopal episode.  The patient was found down in his garage by his wife, unresponsive with labored respirations.  He slowly returned to baseline while in the ED. Prior to admission the patient had a witnessed episode of convulsion.  He was loaded with Keppra 1 gm.    - 2D Echo: EF 65% with normal systolic & diastolic function  -  Neurology consulted, suspect cluster of seizures (3 within 24 hr period) was provoked in the setting of hyponatremia likely due to chronic alcohol abuse  - started on Keppra 500 mg BID on admission, held per neurology recs  - 24 hr EEG in process  - MRI brain W WO contrast (epilepsy protocol):   Generalized cerebral volume loss slightly advanced for age with patchy and confluent regions of T2 FLAIR signal abnormality supratentorial white matter.   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  No evidence for acute infarction or intracranial enhancing mass lesion.  There is slight asymmetrical volume loss in the mesial temporal lobes right being slightly greater than left although no significant asymmetrical signal abnormality can not exclude right mesial temporal sclerosis.    - CIWA protocol as noted below  - Seizure precautions  - aspiration precautions  - neuro checks q4h

## 2020-10-08 NOTE — SUBJECTIVE & OBJECTIVE
Subjective:     Interval History:   More disoriented this morning than yesterday  Keeps taking hospital gown off and lying in bed exposed  Complaining of leg pain   MRI brain completed and EEG in process    Current Facility-Administered Medications   Medication Dose Route Frequency Provider Last Rate Last Dose    0.9%  NaCl infusion   Intravenous Continuous Terri Verde DNP,  mL/hr at 10/07/20 1319      acetaminophen tablet 650 mg  650 mg Oral Q8H PRN Terri Verde DNP, NP   650 mg at 10/08/20 0806    albuterol-ipratropium 2.5 mg-0.5 mg/3 mL nebulizer solution 3 mL  3 mL Nebulization Q6H PRN Terri Verde DNP, NP        amLODIPine tablet 10 mg  10 mg Oral Daily Terri . Mehreen, DNP, NP   10 mg at 10/08/20 0806    aspirin EC tablet 81 mg  81 mg Oral Daily Terri Dilip Verde DNP, NP   81 mg at 10/08/20 0806    bisacodyL suppository 10 mg  10 mg Rectal Daily PRN Terri . Mehreen, DNP, NP        dextrose 50% injection 12.5 g  12.5 g Intravenous PRN Terri . Mehreen, DNP, NP        dextrose 50% injection 25 g  25 g Intravenous PRN Terri Verde DNP, NP        diazePAM tablet 5 mg  5 mg Oral TID Loretta Robles PA-C   5 mg at 10/08/20 0806    folic acid injection 1 mg  1 mg Intravenous Daily Terri Verde DNP, NP   1 mg at 10/08/20 0900    furosemide tablet 40 mg  40 mg Oral Daily Terri Verde DNP, NP   40 mg at 10/08/20 0806    glucagon (human recombinant) injection 1 mg  1 mg Intramuscular PRN Terri Dilip Verde DNP, NP        glucose chewable tablet 16 g  16 g Oral PRN Terri Verde DNP, NP        glucose chewable tablet 24 g  24 g Oral PRN Terri Verde DNP, NP        heparin (porcine) injection 5,000 Units  5,000 Units Subcutaneous Q8H Terri Dilip Verde DNP, NP   5,000 Units at 10/08/20 0600    hydroCHLOROthiazide tablet 12.5 mg  12.5 mg Oral Daily Terri M. Mehrene, DNP, NP        LORazepam tablet 2 mg  2 mg Oral Q4H PRN Loretta Robles, PA-C         melatonin tablet 6 mg  6 mg Oral Nightly PRN Terri NICOLEDilip Verde DNP, NP   6 mg at 10/07/20 2159    metoprolol tartrate (LOPRESSOR) tablet 100 mg  100 mg Oral BID Terri KIKA Verde DNP, NP   100 mg at 10/08/20 0806    multivitamin tablet  1 tablet Oral Daily Terri RIVERADilip Verde DNP, NP   1 tablet at 10/08/20 0806    ondansetron disintegrating tablet 8 mg  8 mg Oral Q8H PRN Terri Verde DNP, NP        ondansetron injection 4 mg  4 mg Intravenous Q8H PRN Terrijose Verde DNP, NP        polyethylene glycol packet 17 g  17 g Oral Daily Terri Verde DNP, NP        potassium phosphate 15 mmol in dextrose 5 % 250 mL infusion  15 mmol Intravenous Once Anuja Velasco PA-C 62.5 mL/hr at 10/08/20 1100 15 mmol at 10/08/20 1100    thiamine tablet 100 mg  100 mg Oral Daily Terrijose Verde DNP, NP   100 mg at 10/08/20 0806    vancomycin 1.5 g in dextrose 5 % 250 mL IVPB (ready to mix)  1,500 mg Intravenous Q12H Dallas Mayorga .7 mL/hr at 10/08/20 0200 1,500 mg at 10/08/20 0200     Review of Systems   Constitutional: Positive for activity change and fatigue. Negative for fever.   HENT: Negative for trouble swallowing and voice change.    Eyes: Negative for visual disturbance.   Respiratory: Negative for shortness of breath.    Cardiovascular: Negative for chest pain.   Gastrointestinal: Negative for nausea and vomiting.   Musculoskeletal: Positive for arthralgias and myalgias. Negative for neck stiffness.   Skin: Positive for color change.   Neurological: Positive for weakness. Negative for dizziness, facial asymmetry, speech difficulty, numbness and headaches.   Psychiatric/Behavioral: Positive for confusion, decreased concentration and sleep disturbance. Negative for agitation.     Objective:     Vital Signs (Most Recent):  Temp: 100.3 °F (37.9 °C) (10/08/20 0800)  Pulse: 81 (10/08/20 0800)  Resp: 20 (10/08/20 0800)  BP: 138/63 (10/08/20 0800)  SpO2: 95 % (10/08/20 0400) Vital Signs (24h  Range):  Temp:  [98.4 °F (36.9 °C)-100.3 °F (37.9 °C)] 100.3 °F (37.9 °C)  Pulse:  [77-81] 81  Resp:  [20-22] 20  SpO2:  [95 %] 95 %  BP: (138-141)/(63-82) 138/63     Weight: 102.1 kg (225 lb)  Body mass index is 32.28 kg/m².    Physical Exam  Eyes:      Extraocular Movements: EOM normal.      Pupils: Pupils are equal, round, and reactive to light.   Neurological:      Coordination: Finger-Nose-Finger Test normal.   Psychiatric:         Speech: Speech normal.         NEUROLOGICAL EXAMINATION:     MENTAL STATUS   Oriented to person.   Disoriented to place. Disoriented to city.   Disoriented to date and day. Oriented to year and month.   Follows 1 step commands.   Attention: decreased. Concentration: decreased.   Speech: speech is normal   Level of consciousness: alert       Thinks he is in [place]   But able to correctly name his home address and knows his street is in [place]    Able to name wife at bedside     CRANIAL NERVES     CN III, IV, VI   Pupils are equal, round, and reactive to light.  Extraocular motions are normal.   Nystagmus: none   Diplopia: none  Ophthalmoparesis: none    CN V   Facial sensation intact.     CN VII   Facial expression full, symmetric.     CN VIII   Hearing: intact    CN IX, X   Palate: symmetric    CN XI   CN XI normal.     CN XII   CN XII normal.     MOTOR EXAM   Muscle bulk: normal  Overall muscle tone: normal  Right arm pronator drift: absent  Left arm pronator drift: absent    Strength   Right biceps: 5/5  Left biceps: 5/5  Right triceps: 5/5  Left triceps: 5/5  Right interossei: 5/5  Left interossei: 5/5       Strength exam limited in lower extremity due to pain but able to lift both legs against gravity spontaneously    No abnormal movements noted      SENSORY EXAM   Right arm light touch: normal  Left arm light touch: normal  Right leg light touch: normal  Left leg light touch: normal    GAIT AND COORDINATION     Gait  Gait: (deferred)     Coordination   Finger to nose  coordination: normal    Tremor   Resting tremor: absent    Significant Labs:   CBC:   Recent Labs   Lab 10/07/20  0434 10/07/20  1109 10/08/20  0254   WBC 14.74* 12.85* 13.98*   HGB 14.7 15.1 13.7*   HCT 42.0 43.9 41.0    261 236     CMP:   Recent Labs   Lab 10/07/20  0434 10/07/20  1109 10/08/20  0254   * 116* 152*   * 136 136   K 3.1* 3.1* 3.6   CL 97 99 98   CO2 21* 25 26   BUN 6* 5* 6*   CREATININE 0.8 0.8 0.8   CALCIUM 8.5* 8.5* 8.1*   MG 2.1  --  2.0   PROT 7.6 7.6 7.0   ALBUMIN 3.7 3.7 3.3*   BILITOT 1.3* 1.5* 1.7*   ALKPHOS 91 90 77   AST 39 37 36   ALT 20 19 16   ANIONGAP 16 12 12   EGFRNONAA >60.0 >60.0 >60.0     Inflammatory Markers:   Recent Labs   Lab 10/07/20  1305   SEDRATE 25*   CRP 33.0*     Urine Culture: No results for input(s): LABURIN in the last 48 hours.  Urine Studies:   Recent Labs   Lab 10/06/20  2131   COLORU Yellow   APPEARANCEUA Clear   PHUR 6.0   SPECGRAV 1.010   PROTEINUA Negative   GLUCUA Negative   KETONESU Negative   BILIRUBINUA Negative   OCCULTUA Negative   NITRITE Negative   LEUKOCYTESUR Negative     All pertinent lab results from the past 24 hours have been reviewed.    --24 hr EEG (in process)    Significant Imaging: I have reviewed and interpreted all pertinent imaging results/findings within the past 24 hours.     MRI Brain W WO contrast (epilepsy protocol) 10/7/20:  Generalized cerebral volume loss slightly advanced for age with patchy and confluent regions of T2 FLAIR signal abnormality supratentorial white matter while nonspecific concerning for moderate degree of chronic microvascular ischemic change. 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 No evidence for acute infarction or intracranial enhancing mass lesion. There is slight asymmetrical volume loss in the mesial temporal lobes right being slightly greater than left although no significant asymmetrical signal abnormality can not exclude right mesial temporal sclerosis.

## 2020-10-08 NOTE — SUBJECTIVE & OBJECTIVE
Interval History: NAEON. Patient evaluated at bedside this AM. Patient without complaints this morning. Per wife, patient with increasing confusion & resting tremor for the past few months. Patient & wife deny further episodes of LOC/convulsions since admit. LLE notable fore cellulitis, will start IV abx/ plan for neuo to see pt today    Review of Systems   Constitutional: Positive for activity change. Negative for chills and fever.   Respiratory: Negative for chest tightness and shortness of breath.    Cardiovascular: Negative for chest pain and leg swelling.   Gastrointestinal: Negative for abdominal pain and nausea.   Skin: Positive for color change and wound.   Neurological: Positive for tremors, seizures and syncope. Negative for dizziness, facial asymmetry, speech difficulty, weakness and light-headedness.   Psychiatric/Behavioral: Positive for confusion. The patient is not nervous/anxious.      Objective:     Vital Signs (Most Recent):  Temp: 99.1 °F (37.3 °C) (10/07/20 2150)  Pulse: 77 (10/07/20 2150)  Resp: 20 (10/07/20 2150)  BP: (!) 140/68 (10/07/20 2150)  SpO2: 95 % (10/07/20 2150) Vital Signs (24h Range):  Temp:  [98.4 °F (36.9 °C)-99.1 °F (37.3 °C)] 99.1 °F (37.3 °C)  Pulse:  [62-84] 77  Resp:  [16-22] 20  SpO2:  [93 %-96 %] 95 %  BP: (124-159)/(62-72) 140/68     Weight: 102.1 kg (225 lb)  Body mass index is 32.28 kg/m².    Intake/Output Summary (Last 24 hours) at 10/7/2020 4500  Last data filed at 10/7/2020 1500  Gross per 24 hour   Intake 440 ml   Output 1500 ml   Net -1060 ml      Physical Exam  Vitals signs and nursing note reviewed.   Constitutional:       Appearance: He is well-developed.   Eyes:      General: No scleral icterus.     Extraocular Movements: Extraocular movements intact.   Cardiovascular:      Rate and Rhythm: Normal rate and regular rhythm.   Pulmonary:      Effort: Pulmonary effort is normal.      Breath sounds: Normal breath sounds.   Abdominal:      General: Bowel sounds are  normal. There is distension.      Palpations: Abdomen is soft.      Tenderness: There is no abdominal tenderness. There is no guarding or rebound.   Musculoskeletal:         General: Swelling and tenderness present.      Comments: LLE cellulitis; see image below   Skin:     General: Skin is warm and dry.      Findings: Erythema present.   Neurological:      General: No focal deficit present.      Mental Status: He is alert and oriented to person, place, and time.   Psychiatric:         Behavior: Behavior normal.                 Significant Labs:   CBC:   Recent Labs   Lab 10/06/20  2146 10/07/20  0434 10/07/20  1109   WBC 14.71* 14.74* 12.85*   HGB 15.0 14.7 15.1   HCT 43.1 42.0 43.9    255 261     CMP:   Recent Labs   Lab 10/06/20  2146 10/07/20  0434 10/07/20  1109   * 134* 136   K 3.5 3.1* 3.1*   CL 95 97 99   CO2 24 21* 25   * 125* 116*   BUN 7* 6* 5*   CREATININE 0.9 0.8 0.8   CALCIUM 9.0 8.5* 8.5*   PROT 8.0 7.6 7.6   ALBUMIN 3.9 3.7 3.7   BILITOT 1.1* 1.3* 1.5*   ALKPHOS 94 91 90   AST 33 39 37   ALT 21 20 19   ANIONGAP 12 16 12   EGFRNONAA >60.0 >60.0 >60.0     All pertinent labs within the past 24 hours have been reviewed.    Significant Imaging: I have reviewed all pertinent imaging results/findings within the past 24 hours.

## 2020-10-08 NOTE — PLAN OF CARE
Problem: Adult Inpatient Plan of Care  Goal: Plan of Care Review  Outcome: Ongoing, Progressing   Poc reviewed with pt and wife. VSS. A&O. No acute distress. Resp with ease on room air. Pt independent with min one person assist. Instructed to call if need to get out of bed. No concerns voiced. Will continue to monitor.

## 2020-10-08 NOTE — ASSESSMENT & PLAN NOTE
Leukocytosis  - redness, warmth and tenderness noted to LLE on admission; reported pain since scratching it at home a few days ago  - 1/4 SIRS on admission w/ WBC 14; afebrile & HDS  - started on IV vanc on 10/07  - pt spiked fever on 10/08- Tmax 101. WBC increased from 12.85 to 13.98. Added ceftriaxone for broader coverage  - transition to PO on d/c

## 2020-10-08 NOTE — ASSESSMENT & PLAN NOTE
Convulsion  Roshan Rankin is a 75 y.o. male with a significant medical history of HTN, ETOH abuse (10-12 beers every day) who presents to the hospital complaining of syncopal episode.  The patient was found down in his garage by his wife, unresponsive with labored respirations.  He slowly returned to baseline while in the ED. Prior to admission the patient had a witnessed episode of convulsion.  He was loaded with Keppra 1 gm.  -2D Echo: EF 65%, normal systolic and diastolic function  -EEG   - initially started Keppra 500 mg BID, discontinued per neurology recs  -Consult General Neurology, appreciate the consult and rec  - seizure precautions  - telemetry

## 2020-10-08 NOTE — ASSESSMENT & PLAN NOTE
75 y.o. male with HTN and alcohol abuse presented to ED 10/6 after LOC episodes at home. Wife found pt lying on the floor of the garage with abnormally loud respirations and decreased awareness both times. In the ED, he was witnessed to have a GTC lasting 10-15 seconds with tongue biting. Was given total of 2 g IV Keppra then started on 500 mg BID. CT head WO contrast unremarkable for acute intracranial pathology. Labs with leukocytosis and hyponatremia (131). No previous hx of seizures or similar episodes. Pt and wife did say he drank less alcohol yesterday (~4 beers all day), but denies recent cut back in alcohol consumption.     >>Suspect cluster of seizures (3 within 24 hr period) was provoked in the setting of hyponatremia likely due to chronic alcohol abuse    10/8--MRI brain W WO contrast completed with generalized volume loss and moderate degree of chronic microvascular ischemic disease. Small R thalamic lacunar infarct. Slight asymmetrical volume loss in the mesial temporal lobes R slightly greater than L although no significant asymmetrical signal abnormality can not exclude right mesial temporal sclerosis. No acute intracranial pathology.     Recommendations:  --24 hr vEEG in process  --continue holding Keppra for now  --continue seizure precautions

## 2020-10-08 NOTE — HOSPITAL COURSE
Mr. Rankin was admitted to hospital medicine service for evaluation of possible syncopal episode with convulsions, suspected secondary to alcohol withdrawal. Witnessed generalized tonic clonic lasting 10-15 seconds with tongue biting in ED, given total of 2 g IV Keppra then started on 500 mg BID. CTH without acute abnormalities. Labs on admit notable for leukocytosis (14) and hyponatremia (131). No previous hx of seizures or similar episodes. Neuro consulted, suspect cluster of seizures (3 within 24 hr period) was provoked in the setting of hyponatremia likely due to chronic alcohol abuse. MRI w w/o contrast showed generalized volume loss and moderate degree of chronic microvascular ischemic disease; no acute intracranial pathology. 24 hr vEEG showed mild, generalized, non-specific cerebral dysfunction. Neurology suspect alcohol withdrawal provoked seizure, and no AEDs at this time. Patient successfully completed valium taper for alcohol withdrawal. Multivitamin/thiamine/folate supplementation given throughout stay, will continue at discharge.     Hospital course complicated but LLE cellulitis, likely etiology of leukocytosis on admit, started on IV Vanc. Bcx NGTD. Patient became febrile with increased WBC, added CTX. Suspect alcohol withdrawal contributing, valium increased with appropriate response. Transitioned patient from CTX to zosyn due to persistent leukocytosis and consulted ID--recommended resuming previous tx with vanc + CTX. Pt transitioned to oral abx on 10/12, leukocytosis resolved. Consulted ortho surgery for evaluation of bilateral knee pain to r/o septic arthritis. Joint fluid revealed pseudogout, started on colchicine and naproxen with improvement in symptoms. Pt also started on daily PPI for ulcer ppx while undergoing tx w/ NSAIDs.     PT/OT consulted, recommend SNF. Patient medically stable for discharge to [facility]. Discharged on cefadroxil & doxycycline x 5 days to complete 14 day course  (last dose 10/20). Recommend daily probiotics while undergoing abx treatment, rx provided. Follow up appt w/ ID scheduled for 10/20/2020. Ambulatory referral to orthopedics and internal medicine to establish care with PCP placed. Patient verbalized understanding. All questions and concerns addressed.

## 2020-10-08 NOTE — PROGRESS NOTES
[facility] - ICU 14 WT  Neurology  Progress Note    Patient Name: Roshan Rankin  MRN: [id number]  Admission Date: 10/6/2020  Hospital Length of Stay: 0 days  Code Status: Full Code   Attending Provider: Micky Crockett MD  Primary Care Physician: Ben Brannon MD   Principal Problem:Convulsion    HPI:   75 y.o. male with HTN and alcohol abuse (10-12 beers daily) presented to ED 10/6/20 after 2 LOC episodes at home. Wife reports first episode occurred around 10 am. Pt was found lying on his side on the floor in the garage making abnormally loud respirations. Pt thought he had fainted. [place] tired afterwards, ate lunch and rested for a few hours. Later that evening, wife was inside and heard a thud in the garage. Once again he was found lying on the ground with abnormally loud respirations and this time blood coming out of his mouth. Wife called EMS and in the ED, he was witnessed to have a 3rd episode of tonic-clonic movements and tongue biting. He was given 1 g of Keppra x 2 then started on 500 mg BID. Labs remarkable for leukocytosis (14.71>>12.85) and hyponatremia (131). CT head WO contrast unremarkable for acute intracranial pathology. Placed on CIWA protocol and started on folic acid and thiamine given alcohol intake history. Wife says he had an abnormally small amount of alcohol yesterday, probably ~4 beers, but otherwise has not cut back on drinking lately. Denies any previous hx of seizures, family hx of epilepsy, CNS infections. Remote head trauma from football. Denies taking medications that lower the seizure threshold. Neurology consulted 10/7 for new onset seizures.       Subjective:     Interval History:   More disoriented this morning than yesterday  Keeps taking hospital gown off and lying in bed exposed  Complaining of leg pain   MRI brain completed and EEG in process    Current Facility-Administered Medications   Medication Dose Route Frequency Provider Last Rate Last Dose    0.9%   NaCl infusion   Intravenous Continuous Terri Verde DNP,  mL/hr at 10/07/20 1319      acetaminophen tablet 650 mg  650 mg Oral Q8H PRN Terri Dilip Verde DNP, NP   650 mg at 10/08/20 0806    albuterol-ipratropium 2.5 mg-0.5 mg/3 mL nebulizer solution 3 mL  3 mL Nebulization Q6H PRN Terri Verde DNP, NP        amLODIPine tablet 10 mg  10 mg Oral Daily [facility]   10 mg at 10/08/20 0806    aspirin EC tablet 81 mg  81 mg Oral Daily [facility]   81 mg at 10/08/20 0806    bisacodyL suppository 10 mg  10 mg Rectal Daily PRN Terri . Mehreen, DNP, NP        dextrose 50% injection 12.5 g  12.5 g Intravenous PRN Terri . Mehreen, DNP, NP        dextrose 50% injection 25 g  25 g Intravenous PRN Terri Dilip Verde DNP, NP        diazePAM tablet 5 mg  5 mg Oral TID Loretta Robles PA-C   5 mg at 10/08/20 0806    folic acid injection 1 mg  1 mg Intravenous Daily Terri Dilip Verde DNP, NP   1 mg at 10/08/20 0900    furosemide tablet 40 mg  40 mg Oral Daily Terri MDilip Verde DNP NP   40 mg at 10/08/20 0806    glucagon (human recombinant) injection 1 mg  1 mg Intramuscular PRN Terri Dilip Verde DNP, NP        glucose chewable tablet 16 g  16 g Oral PRN Terri Verde DNP, NP        glucose chewable tablet 24 g  24 g Oral PRN Terri Verde DNP, NP        heparin (porcine) injection 5,000 Units  5,000 Units Subcutaneous Q8H Terri Dilip Verde DNP, NP   5,000 Units at 10/08/20 0600    hydroCHLOROthiazide tablet 12.5 mg  12.5 mg Oral Daily Terri Dilip Verde DNP, NP        LORazepam tablet 2 mg  2 mg Oral Q4H PRN Loretta Robles PA-C        melatonin tablet 6 mg  6 mg Oral Nightly PRN Terri Verde DNP, NP   6 mg at 10/07/20 2159    metoprolol tartrate (LOPRESSOR) tablet 100 mg  100 mg Oral BID Terri Verde DNP, NP   100 mg at 10/08/20 0806    multivitamin tablet  1 tablet Oral Daily Terri Verde DNP, NP   1 tablet at 10/08/20 0806     ondansetron disintegrating tablet 8 mg  8 mg Oral Q8H PRN Terri Verde DNP, EVELYN        ondansetron injection 4 mg  4 mg Intravenous Q8H PRN Terri Verde DNP, NP        polyethylene glycol packet 17 g  17 g Oral Daily Terri Verde DNP, NP        potassium phosphate 15 mmol in dextrose 5 % 250 mL infusion  15 mmol Intravenous Once Anuja Velasco PA-C 62.5 mL/hr at 10/08/20 1100 15 mmol at 10/08/20 1100    thiamine tablet 100 mg  100 mg Oral Daily Terri Verde DNP, NP   100 mg at 10/08/20 0806    vancomycin 1.5 g in dextrose 5 % 250 mL IVPB (ready to mix)  1,500 mg Intravenous Q12H [place] Mayorga .7 mL/hr at 10/08/20 0200 1,500 mg at 10/08/20 0200     Review of Systems   Constitutional: Positive for activity change and fatigue. Negative for fever.   HENT: Negative for trouble swallowing and voice change.    Eyes: Negative for visual disturbance.   Respiratory: Negative for shortness of breath.    Cardiovascular: Negative for chest pain.   Gastrointestinal: Negative for nausea and vomiting.   Musculoskeletal: Positive for arthralgias and myalgias. Negative for neck stiffness.   Skin: Positive for color change.   Neurological: Positive for weakness. Negative for dizziness, facial asymmetry, speech difficulty, numbness and headaches.   Psychiatric/Behavioral: Positive for confusion, decreased concentration and sleep disturbance. Negative for agitation.     Objective:     Vital Signs (Most Recent):  Temp: 100.3 °F (37.9 °C) (10/08/20 0800)  Pulse: 81 (10/08/20 0800)  Resp: 20 (10/08/20 0800)  BP: 138/63 (10/08/20 0800)  SpO2: 95 % (10/08/20 0400) Vital Signs (24h Range):  Temp:  [98.4 °F (36.9 °C)-100.3 °F (37.9 °C)] 100.3 °F (37.9 °C)  Pulse:  [77-81] 81  Resp:  [20-22] 20  SpO2:  [95 %] 95 %  BP: (138-141)/(63-82) 138/63     Weight: 102.1 kg (225 lb)  Body mass index is 32.28 kg/m².    Physical Exam  Eyes:      Extraocular Movements: EOM normal.      Pupils: Pupils are equal, round,  and reactive to light.   Neurological:      Coordination: Finger-Nose-Finger Test normal.   Psychiatric:         Speech: Speech normal.         NEUROLOGICAL EXAMINATION:     MENTAL STATUS   Oriented to person.   Disoriented to place. Disoriented to city.   Disoriented to date and day. Oriented to year and month.   Follows 1 step commands.   Attention: decreased. Concentration: decreased.   Speech: speech is normal   Level of consciousness: alert       Thinks he is in [place]   But able to correctly name his home address and knows his street is in [place]    Able to name wife at bedside     CRANIAL NERVES     CN III, IV, VI   Pupils are equal, round, and reactive to light.  Extraocular motions are normal.   Nystagmus: none   Diplopia: none  Ophthalmoparesis: none    CN V   Facial sensation intact.     CN VII   Facial expression full, symmetric.     CN VIII   Hearing: intact    CN IX, X   Palate: symmetric    CN XI   CN XI normal.     CN XII   CN XII normal.     MOTOR EXAM   Muscle bulk: normal  Overall muscle tone: normal  Right arm pronator drift: absent  Left arm pronator drift: absent    Strength   Right biceps: 5/5  Left biceps: 5/5  Right triceps: 5/5  Left triceps: 5/5  Right interossei: 5/5  Left interossei: 5/5       Strength exam limited in lower extremity due to pain but able to lift both legs against gravity spontaneously    No abnormal movements noted      SENSORY EXAM   Right arm light touch: normal  Left arm light touch: normal  Right leg light touch: normal  Left leg light touch: normal    GAIT AND COORDINATION     Gait  Gait: (deferred)     Coordination   Finger to nose coordination: normal    Tremor   Resting tremor: absent    Significant Labs:   CBC:   Recent Labs   Lab 10/07/20  0434 10/07/20  1109 10/08/20  0254   WBC 14.74* 12.85* 13.98*   HGB 14.7 15.1 13.7*   HCT 42.0 43.9 41.0    261 236     CMP:   Recent Labs   Lab 10/07/20  0434 10/07/20  1109 10/08/20  0254   * 116* 152*   NA  134* 136 136   K 3.1* 3.1* 3.6   CL 97 99 98   CO2 21* 25 26   BUN 6* 5* 6*   CREATININE 0.8 0.8 0.8   CALCIUM 8.5* 8.5* 8.1*   MG 2.1  --  2.0   PROT 7.6 7.6 7.0   ALBUMIN 3.7 3.7 3.3*   BILITOT 1.3* 1.5* 1.7*   ALKPHOS 91 90 77   AST 39 37 36   ALT 20 19 16   ANIONGAP 16 12 12   EGFRNONAA >60.0 >60.0 >60.0     Inflammatory Markers:   Recent Labs   Lab 10/07/20  1305   SEDRATE 25*   CRP 33.0*     Urine Culture: No results for input(s): LABURIN in the last 48 hours.  Urine Studies:   Recent Labs   Lab 10/06/20  2131   COLORU Yellow   APPEARANCEUA Clear   PHUR 6.0   SPECGRAV 1.010   PROTEINUA Negative   GLUCUA Negative   KETONESU Negative   BILIRUBINUA Negative   OCCULTUA Negative   NITRITE Negative   LEUKOCYTESUR Negative     All pertinent lab results from the past 24 hours have been reviewed.    --24 hr EEG (in process)    Significant Imaging: I have reviewed and interpreted all pertinent imaging results/findings within the past 24 hours.     MRI Brain W WO contrast (epilepsy protocol) 10/7/20:  Generalized cerebral volume loss slightly advanced for age with patchy and confluent regions of T2 FLAIR signal abnormality supratentorial white matter while nonspecific concerning for moderate degree of chronic microvascular ischemic change. 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 No evidence for acute infarction or intracranial enhancing mass lesion. There is slight asymmetrical volume loss in the mesial temporal lobes right being slightly greater than left although no significant asymmetrical signal abnormality can not exclude right mesial temporal sclerosis.    Assessment and Plan:     * Convulsion  75 y.o. male with HTN and alcohol abuse presented to ED 10/6 after LOC episodes at home. Wife found pt lying on the floor of the garage with abnormally loud respirations and decreased awareness both times. In the ED, he was witnessed to have a GTC lasting 10-15 seconds with tongue biting. Was given total  of 2 g IV Keppra then started on 500 mg BID. CT head WO contrast unremarkable for acute intracranial pathology. Labs with leukocytosis and hyponatremia (131). No previous hx of seizures or similar episodes. Pt and wife did say he drank less alcohol yesterday (~4 beers all day), but denies recent cut back in alcohol consumption.     >>Suspect cluster of seizures (3 within 24 hr period) was provoked in the setting of hyponatremia likely due to chronic alcohol abuse    10/8--MRI brain W WO contrast completed with generalized volume loss and moderate degree of chronic microvascular ischemic disease. Small R thalamic lacunar infarct. Slight asymmetrical volume loss in the mesial temporal lobes R slightly greater than L although no significant asymmetrical signal abnormality can not exclude right mesial temporal sclerosis. No acute intracranial pathology.     Recommendations:  --24 hr vEEG in process  --continue holding Keppra for now  --continue seizure precautions    Alcohol abuse  Reports drinking 10-12 (12 oz) beers daily   PETH pending    --thiamine, folate and multivitamins qd  --valium 5 mg TID started for withdrawal ppx    VTE Risk Mitigation (From admission, onward)         Ordered     heparin (porcine) injection 5,000 Units  Every 8 hours      10/07/20 0109     IP VTE HIGH RISK PATIENT  Once      10/07/20 0109     Place sequential compression device  Until discontinued      10/07/20 0109              Esperanza Vasquez PA-C  General Neurology Consult  Neuro Consult [facility] # [id number]

## 2020-10-09 LAB
ALBUMIN SERPL BCP-MCNC: 2.8 G/DL (ref 3.5–5.2)
ALP SERPL-CCNC: 65 U/L (ref 55–135)
ALT SERPL W/O P-5'-P-CCNC: 14 U/L (ref 10–44)
ANION GAP SERPL CALC-SCNC: 11 MMOL/L (ref 8–16)
AST SERPL-CCNC: 28 U/L (ref 10–40)
BASOPHILS # BLD AUTO: 0.04 K/UL (ref 0–0.2)
BASOPHILS NFR BLD: 0.3 % (ref 0–1.9)
BILIRUB SERPL-MCNC: 1.8 MG/DL (ref 0.1–1)
BUN SERPL-MCNC: 7 MG/DL (ref 8–23)
CALCIUM SERPL-MCNC: 8 MG/DL (ref 8.7–10.5)
CHLORIDE SERPL-SCNC: 100 MMOL/L (ref 95–110)
CO2 SERPL-SCNC: 23 MMOL/L (ref 23–29)
CREAT SERPL-MCNC: 0.7 MG/DL (ref 0.5–1.4)
DIFFERENTIAL METHOD: ABNORMAL
EOSINOPHIL # BLD AUTO: 0 K/UL (ref 0–0.5)
EOSINOPHIL NFR BLD: 0 % (ref 0–8)
ERYTHROCYTE [DISTWIDTH] IN BLOOD BY AUTOMATED COUNT: 12.7 % (ref 11.5–14.5)
EST. GFR  (AFRICAN AMERICAN): >60 ML/MIN/1.73 M^2
EST. GFR  (NON AFRICAN AMERICAN): >60 ML/MIN/1.73 M^2
GLUCOSE SERPL-MCNC: 119 MG/DL (ref 70–110)
HCT VFR BLD AUTO: 42.8 % (ref 40–54)
HGB BLD-MCNC: 13.8 G/DL (ref 14–18)
IMM GRANULOCYTES # BLD AUTO: 0.09 K/UL (ref 0–0.04)
IMM GRANULOCYTES NFR BLD AUTO: 0.6 % (ref 0–0.5)
LYMPHOCYTES # BLD AUTO: 1.4 K/UL (ref 1–4.8)
LYMPHOCYTES NFR BLD: 9.5 % (ref 18–48)
MAGNESIUM SERPL-MCNC: 1.5 MG/DL (ref 1.6–2.6)
MCH RBC QN AUTO: 32.2 PG (ref 27–31)
MCHC RBC AUTO-ENTMCNC: 32.2 G/DL (ref 32–36)
MCV RBC AUTO: 100 FL (ref 82–98)
MONOCYTES # BLD AUTO: 2.2 K/UL (ref 0.3–1)
MONOCYTES NFR BLD: 14.8 % (ref 4–15)
NEUTROPHILS # BLD AUTO: 11 K/UL (ref 1.8–7.7)
NEUTROPHILS NFR BLD: 74.8 % (ref 38–73)
NRBC BLD-RTO: 0 /100 WBC
PHOSPHATE SERPL-MCNC: 2.1 MG/DL (ref 2.7–4.5)
PLATELET # BLD AUTO: 204 K/UL (ref 150–350)
PLATELET BLD QL SMEAR: ABNORMAL
PMV BLD AUTO: 11.3 FL (ref 9.2–12.9)
POTASSIUM SERPL-SCNC: 3.1 MMOL/L (ref 3.5–5.1)
PROT SERPL-MCNC: 6.7 G/DL (ref 6–8.4)
RBC # BLD AUTO: 4.29 M/UL (ref 4.6–6.2)
SODIUM SERPL-SCNC: 134 MMOL/L (ref 136–145)
VANCOMYCIN TROUGH SERPL-MCNC: 14.1 UG/ML (ref 10–22)
WBC # BLD AUTO: 14.68 K/UL (ref 3.9–12.7)

## 2020-10-09 PROCEDURE — 99233 PR SUBSEQUENT HOSPITAL CARE,LEVL III: ICD-10-PCS | Mod: ,,, | Performed by: PHYSICIAN ASSISTANT

## 2020-10-09 PROCEDURE — 99233 SBSQ HOSP IP/OBS HIGH 50: CPT | Mod: GC,,, | Performed by: PSYCHIATRY & NEUROLOGY

## 2020-10-09 PROCEDURE — 36415 COLL VENOUS BLD VENIPUNCTURE: CPT

## 2020-10-09 PROCEDURE — 11000001 HC ACUTE MED/SURG PRIVATE ROOM

## 2020-10-09 PROCEDURE — 95718 EEG PHYS/QHP 2-12 HR W/VEEG: CPT | Mod: ,,, | Performed by: PSYCHIATRY & NEUROLOGY

## 2020-10-09 PROCEDURE — 83735 ASSAY OF MAGNESIUM: CPT

## 2020-10-09 PROCEDURE — 80053 COMPREHEN METABOLIC PANEL: CPT

## 2020-10-09 PROCEDURE — 97535 SELF CARE MNGMENT TRAINING: CPT

## 2020-10-09 PROCEDURE — 63600175 PHARM REV CODE 636 W HCPCS: Performed by: NURSE PRACTITIONER

## 2020-10-09 PROCEDURE — 99233 SBSQ HOSP IP/OBS HIGH 50: CPT | Mod: ,,, | Performed by: PHYSICIAN ASSISTANT

## 2020-10-09 PROCEDURE — 80202 ASSAY OF VANCOMYCIN: CPT

## 2020-10-09 PROCEDURE — 25000003 PHARM REV CODE 250: Performed by: PHYSICIAN ASSISTANT

## 2020-10-09 PROCEDURE — 97530 THERAPEUTIC ACTIVITIES: CPT

## 2020-10-09 PROCEDURE — 99233 PR SUBSEQUENT HOSPITAL CARE,LEVL III: ICD-10-PCS | Mod: GC,,, | Performed by: PSYCHIATRY & NEUROLOGY

## 2020-10-09 PROCEDURE — 97110 THERAPEUTIC EXERCISES: CPT

## 2020-10-09 PROCEDURE — 63600175 PHARM REV CODE 636 W HCPCS: Performed by: INTERNAL MEDICINE

## 2020-10-09 PROCEDURE — 84100 ASSAY OF PHOSPHORUS: CPT

## 2020-10-09 PROCEDURE — 97165 OT EVAL LOW COMPLEX 30 MIN: CPT

## 2020-10-09 PROCEDURE — 25000003 PHARM REV CODE 250: Performed by: NURSE PRACTITIONER

## 2020-10-09 PROCEDURE — 63600175 PHARM REV CODE 636 W HCPCS: Performed by: PHYSICIAN ASSISTANT

## 2020-10-09 PROCEDURE — 97162 PT EVAL MOD COMPLEX 30 MIN: CPT

## 2020-10-09 PROCEDURE — 85025 COMPLETE CBC W/AUTO DIFF WBC: CPT

## 2020-10-09 PROCEDURE — 95718 PR EEG, W/VIDEO, CONT RECORD, I&R, 2-12 HRS: ICD-10-PCS | Mod: ,,, | Performed by: PSYCHIATRY & NEUROLOGY

## 2020-10-09 RX ORDER — LIDOCAINE 50 MG/G
2 PATCH TOPICAL DAILY PRN
Status: DISCONTINUED | OUTPATIENT
Start: 2020-10-09 | End: 2020-10-15

## 2020-10-09 RX ORDER — FOLIC ACID 1 MG/1
1 TABLET ORAL DAILY
Status: DISCONTINUED | OUTPATIENT
Start: 2020-10-10 | End: 2020-10-15 | Stop reason: HOSPADM

## 2020-10-09 RX ORDER — MAGNESIUM SULFATE HEPTAHYDRATE 40 MG/ML
2 INJECTION, SOLUTION INTRAVENOUS ONCE
Status: COMPLETED | OUTPATIENT
Start: 2020-10-09 | End: 2020-10-09

## 2020-10-09 RX ADMIN — SODIUM CHLORIDE: 0.9 INJECTION, SOLUTION INTRAVENOUS at 02:10

## 2020-10-09 RX ADMIN — DIAZEPAM 10 MG: 5 TABLET ORAL at 03:10

## 2020-10-09 RX ADMIN — FOLIC ACID 1 MG: 5 INJECTION, SOLUTION INTRAMUSCULAR; INTRAVENOUS; SUBCUTANEOUS at 09:10

## 2020-10-09 RX ADMIN — SODIUM PHOSPHATE, MONOBASIC, MONOHYDRATE AND SODIUM PHOSPHATE, DIBASIC, ANHYDROUS 15 MMOL: 276; 142 INJECTION, SOLUTION INTRAVENOUS at 10:10

## 2020-10-09 RX ADMIN — VANCOMYCIN HYDROCHLORIDE 1500 MG: 1.5 INJECTION, POWDER, LYOPHILIZED, FOR SOLUTION INTRAVENOUS at 05:10

## 2020-10-09 RX ADMIN — METOPROLOL TARTRATE 100 MG: 100 TABLET ORAL at 10:10

## 2020-10-09 RX ADMIN — ACETAMINOPHEN 650 MG: 325 TABLET ORAL at 10:10

## 2020-10-09 RX ADMIN — MAGNESIUM SULFATE IN WATER 2 G: 40 INJECTION, SOLUTION INTRAVENOUS at 10:10

## 2020-10-09 RX ADMIN — DIAZEPAM 10 MG: 5 TABLET ORAL at 10:10

## 2020-10-09 RX ADMIN — THERA TABS 1 TABLET: TAB at 10:10

## 2020-10-09 RX ADMIN — PIPERACILLIN AND TAZOBACTAM 4.5 G: 4; .5 INJECTION, POWDER, LYOPHILIZED, FOR SOLUTION INTRAVENOUS; PARENTERAL at 09:10

## 2020-10-09 RX ADMIN — HEPARIN SODIUM 5000 UNITS: 5000 INJECTION INTRAVENOUS; SUBCUTANEOUS at 01:10

## 2020-10-09 RX ADMIN — METOPROLOL TARTRATE 100 MG: 100 TABLET ORAL at 09:10

## 2020-10-09 RX ADMIN — VANCOMYCIN HYDROCHLORIDE 1500 MG: 1.5 INJECTION, POWDER, LYOPHILIZED, FOR SOLUTION INTRAVENOUS at 02:10

## 2020-10-09 RX ADMIN — HEPARIN SODIUM 5000 UNITS: 5000 INJECTION INTRAVENOUS; SUBCUTANEOUS at 06:10

## 2020-10-09 RX ADMIN — FUROSEMIDE 40 MG: 40 TABLET ORAL at 10:10

## 2020-10-09 RX ADMIN — POTASSIUM BICARBONATE 50 MEQ: 978 TABLET, EFFERVESCENT ORAL at 10:10

## 2020-10-09 RX ADMIN — DIAZEPAM 10 MG: 5 TABLET ORAL at 09:10

## 2020-10-09 RX ADMIN — AMLODIPINE BESYLATE 10 MG: 10 TABLET ORAL at 10:10

## 2020-10-09 RX ADMIN — ASPIRIN 81 MG: 81 TABLET, COATED ORAL at 10:10

## 2020-10-09 RX ADMIN — Medication 100 MG: at 10:10

## 2020-10-09 RX ADMIN — POTASSIUM BICARBONATE 25 MEQ: 978 TABLET, EFFERVESCENT ORAL at 01:10

## 2020-10-09 RX ADMIN — HEPARIN SODIUM 5000 UNITS: 5000 INJECTION INTRAVENOUS; SUBCUTANEOUS at 09:10

## 2020-10-09 NOTE — PROGRESS NOTES
[facility]  Neurology  Progress Note    Patient Name: Roshan Rankin  MRN: [id number]  Admission Date: 10/6/2020  Hospital Length of Stay: 1 days  Code Status: Full Code   Attending Provider: Micky Crockett MD  Primary Care Physician: Ben Brannon MD   Principal Problem:Convulsion    HPI:   75 y.o. male with HTN and alcohol abuse (10-12 beers daily) presented to ED 10/6/20 after 2 LOC episodes at home. Wife reports first episode occurred around 10 am. Pt was found lying on his side on the floor in the garage making abnormally loud respirations. Pt thought he had fainted. [place] tired afterwards, ate lunch and rested for a few hours. Later that evening, wife was inside and heard a thud in the garage. Once again he was found lying on the ground with abnormally loud respirations and this time blood coming out of his mouth. Wife called EMS and in the ED, he was witnessed to have a 3rd episode of tonic-clonic movements and tongue biting. He was given 1 g of Keppra x 2 then started on 500 mg BID. Labs remarkable for leukocytosis (14.71>>12.85) and hyponatremia (131). CT head WO contrast unremarkable for acute intracranial pathology. Placed on CIWA protocol and started on folic acid and thiamine given alcohol intake history. Wife says he had an abnormally small amount of alcohol yesterday, probably ~4 beers, but otherwise has not cut back on drinking lately. Denies any previous hx of seizures, family hx of epilepsy, CNS infections. Remote head trauma from football. Denies taking medications that lower the seizure threshold. Neurology consulted 10/7 for new onset seizures.       Overview/Hospital Course:  No notes on file        Subjective:     Interval History: NAEON. Vitals reviewed and stable. Patient's valium taper increased to 10mg TID yesterday evening due to increased confusion/agitation. IV Rocephin added for broader antibacterial coverage. Patient appears well this am; reports sleeping  well. AAOx4. Patient does not appear confused this am. Currently on continuous monitoring EEG. Denies any seizures overnight. Reports improvement in LLE cellulitis, but BLE remain painful with movement/palpation. Denies any other medical complaints this am. Labs reviewed. Leukocytosis of 14. Mg/Phos mildly decreased.     Current Neurological Medications:    Current Facility-Administered Medications   Medication Dose Route Frequency Provider Last Rate Last Dose    0.9%  NaCl infusion   Intravenous Continuous Terri Verde DNP,  mL/hr at 10/09/20 0220      acetaminophen tablet 650 mg  650 mg Oral Q8H PRN Terri Verde DNP, NP   650 mg at 10/09/20 1004    albuterol-ipratropium 2.5 mg-0.5 mg/3 mL nebulizer solution 3 mL  3 mL Nebulization Q6H PRN Terri Verde DNP, NP        amLODIPine tablet 10 mg  10 mg Oral Daily Terri Verde DNP, NP   10 mg at 10/09/20 1000    aspirin EC tablet 81 mg  81 mg Oral Daily Terri Verde DNP, NP   81 mg at 10/09/20 1000    bisacodyL suppository 10 mg  10 mg Rectal Daily PRN Terri Verde DNP, NP        cefTRIAXone injection 1 g  1 g Intravenous Q24H Anuja Velasco PA-C   1 g at 10/08/20 2000    dextrose 50% injection 12.5 g  12.5 g Intravenous PRN Terri Verde DNP, NP        dextrose 50% injection 25 g  25 g Intravenous PRN Terri Verde DNP, NP        diazePAM tablet 10 mg  10 mg Oral TID Anuja Velasco PA-C   10 mg at 10/09/20 1000    folic acid 1 mg in sodium chloride 0.9% 100 mL IVPB  1 mg Intravenous Daily Terri Verde DNP,  mL/hr at 10/09/20 0900 1 mg at 10/09/20 0900    furosemide tablet 40 mg  40 mg Oral Daily Terri Verde DNP, NP   40 mg at 10/09/20 1000    glucagon (human recombinant) injection 1 mg  1 mg Intramuscular PRN Terri Verde DNP, NP        glucose chewable tablet 16 g  16 g Oral PRN Terri M. Mehreen, DNP, NP        glucose chewable tablet 24 g  24 g Oral PRN Terri Verde DNP,  NP        heparin (porcine) injection 5,000 Units  5,000 Units Subcutaneous Q8H Terri Verde DNP, NP   5,000 Units at 10/09/20 0600    hydroCHLOROthiazide tablet 12.5 mg  12.5 mg Oral Daily Terri Verde DNP, NP        HYDROcodone-acetaminophen 5-325 mg per tablet 1 tablet  1 tablet Oral Once PRN Anuja Velasco PA-C        LORazepam tablet 2 mg  2 mg Oral Q4H PRN Loretta Robles PA-C        melatonin tablet 6 mg  6 mg Oral Nightly PRN Terri Verde DNP, NP   6 mg at 10/07/20 2159    metoprolol tartrate (LOPRESSOR) tablet 100 mg  100 mg Oral BID Terri Verde DNP, NP   100 mg at 10/09/20 1000    multivitamin tablet  1 tablet Oral Daily Terri Verde DNP, NP   1 tablet at 10/09/20 1000    ondansetron disintegrating tablet 8 mg  8 mg Oral Q8H PRN Terri Verde DNP, NP        ondansetron injection 4 mg  4 mg Intravenous Q8H PRN Terri Verde DNP, NP        polyethylene glycol packet 17 g  17 g Oral Daily Terri Verde DNP, NP        potassium bicarbonate disintegrating tablet 25 mEq  25 mEq Oral Once Anuja Velasco PA-C        sodium phosphate 15 mmol in dextrose 5 % 250 mL IVPB  15 mmol Intravenous Once Anuja Velasco PA-C 62.5 mL/hr at 10/09/20 1010 15 mmol at 10/09/20 1010    thiamine tablet 100 mg  100 mg Oral Daily Terri Verde DNP, NP   100 mg at 10/09/20 1000    vancomycin 1.5 g in dextrose 5 % 250 mL IVPB (ready to mix)  1,500 mg Intravenous Q12H [place] Mayorga .7 mL/hr at 10/09/20 0200 1,500 mg at 10/09/20 0200       Review of Systems   Review of Systems   Constitutional: Negative for fever, chills.   HENT: Negative for trouble swallowing and voice change.    Eyes: Negative for visual disturbance.   Respiratory: Negative for shortness of breath.    Cardiovascular: Negative for chest pain.   Gastrointestinal: Negative for nausea and vomiting.   Musculoskeletal: Positive for arthralgias and myalgias. Negative for neck stiffness.    Skin: Positive for color change, improving.   Neurological: Positive for weakness in BLE. Negative for dizziness, facial asymmetry, speech difficulty, numbness and headaches.   Psychiatric/Behavioral: Negative for confusion, disorientation, behavioral disturbance. Negative for agitation. AAOx4      Objective:     Vital Signs (Most Recent):  Temp: 99.8 °F (37.7 °C) (10/09/20 0800)  Pulse: 102 (10/09/20 0801)  Resp: 20 (10/09/20 0800)  BP: (!) 144/72 (10/09/20 0800)  SpO2: 100 % (10/08/20 2100) Vital Signs (24h Range):  Temp:  [99.1 °F (37.3 °C)-101 °F (38.3 °C)] 99.8 °F (37.7 °C)  Pulse:  [] 102  Resp:  [20-24] 20  SpO2:  [99 %-100 %] 100 %  BP: (144-147)/(65-72) 144/72     Weight: 102.1 kg (225 lb)  Body mass index is 32.28 kg/m².    Physical Exam  NEUROLOGICAL EXAMINATION:      MENTAL STATUS   Oriented to person.   Oriented to place, city  Oriented to date, day. Oriented to year and month.   Follows 1 step commands.   Attention: Normal. Concentration: Normal.   Speech: speech is normal   Level of consciousness: alert, AAOx4      Able to name wife at bedside      CRANIAL NERVES      CN III, IV, VI   Pupils are equal, round, and reactive to light.  Extraocular motions are normal.   Nystagmus: none   Diplopia: none  Ophthalmoparesis: none     CN V   Facial sensation intact.      CN VII   Facial expression full, symmetric.      CN VIII   Hearing: intact     CN IX, X   Palate: symmetric     CN XI   CN XI normal.      CN XII   CN XII normal.      MOTOR EXAM   Muscle bulk: normal  Overall muscle tone: normal  Right arm pronator drift: absent  Left arm pronator drift: absent     Strength   Right biceps: 5/5  Left biceps: 5/5  Right triceps: 5/5  Left triceps: 5/5  Right interossei: 5/5  Left interossei: 5/5       Strength exam limited in lower extremity due to pain but able to lift both legs against gravity spontaneously    No abnormal movements noted       SENSORY EXAM   Right arm light touch: normal  Left arm  light touch: normal  Right leg light touch: normal  Left leg light touch: normal     GAIT AND COORDINATION      Gait  Gait: (deferred)     Coordination   Finger to nose coordination: normal     Tremor   Resting tremor: absent       Significant Labs: All pertinent lab results from the past 24 hours have been reviewed.    Significant Imaging: I have reviewed all pertinent imaging results/findings within the past 24 hours.    Assessment and Plan:     * Convulsion  75 y.o. male with HTN and alcohol abuse presented to ED 10/6 after LOC episodes at home. Wife found pt lying on the floor of the garage with abnormally loud respirations and decreased awareness both times. In the ED, he was witnessed to have a GTC lasting 10-15 seconds with tongue biting. Was given total of 2 g IV Keppra then started on 500 mg BID. CT head WO contrast unremarkable for acute intracranial pathology. Labs with leukocytosis and hyponatremia (131). No previous hx of seizures or similar episodes. Pt and wife did say he drank less alcohol yesterday (~4 beers all day), but denies recent cut back in alcohol consumption.     >>Suspect cluster of seizures (3 within 24 hr period) was provoked in the setting of hyponatremia likely due to chronic alcohol abuse. EEG monitoring does not demonstrate epileptiform activity.     10/8--MRI brain W WO contrast completed with generalized volume loss and moderate degree of chronic microvascular ischemic disease. Small R thalamic lacunar infarct. Slight asymmetrical volume loss in the mesial temporal lobes R slightly greater than L although no significant asymmetrical signal abnormality can not exclude right mesial temporal sclerosis. No acute intracranial pathology.     Recommendations:  --No AEDs recommended at this time, as seizure likely provoked in the setting of alcohol withdrawal  --EEG does not shows mild, generalized, non-specific cerebral dysfunction.  Assessment of activity in the right temporal lobe is  significantly limited. Clear to discontinue continuous EEG monitoring at this time.   --Continue Valium Taper as per primary team recs  --Neurology to sign off. Thank you for the consult.     Alcohol abuse  Reports drinking 10-12 (12 oz) beers daily   PETH pending    --continue thiamine, folate and multivitamins qd  --Continue Valium current valium taper            VTE Risk Mitigation (From admission, onward)         Ordered     heparin (porcine) injection 5,000 Units  Every 8 hours      10/07/20 0109     IP VTE HIGH RISK PATIENT  Once      10/07/20 0109     Place sequential compression device  Until discontinued      10/07/20 0109                Trent Matthew Wiedemann, MD  Neurology  [facility]

## 2020-10-09 NOTE — PLAN OF CARE
Problem: Physical Therapy Goal  Goal: Physical Therapy Goal  Description: Goals to be met by: 10/26/2020     Patient will increase functional independence with mobility by performin. Supine to sit with Contact Guard Assistance  2. Sit to supine with Contact Guard Assistance  3. Sit to stand transfer with Minimal Assistance  4. Bed to chair transfer with Minimal Assistance using LRAD.  5. Gait  x 25 feet with Minimal Assistance using LRAD.    6. Lower extremity exercise program x30 reps per handout, with independence    Outcome: Ongoing, Progressing       PT eval complete. This patient is an excellent candidate for inpatient rehabilitation, has a qualifying diagnosis, shows increasing activity tolerance,  is motivated to participate in treatment, and has a supportive family willing to assist the patient upon discharge.       Talia Thomson, PT, DPT  10/9/2020

## 2020-10-09 NOTE — PROGRESS NOTES
Pharmacist Intervention IV to PO Note    Roshan Rankin is a 75 y.o. male being treated with IV medication folic acid    Patient Data:    Vital Signs (Most Recent):  Temp: 99.8 °F (37.7 °C) (10/09/20 0800)  Pulse: 102 (10/09/20 0801)  Resp: 20 (10/09/20 0800)  BP: (!) 144/72 (10/09/20 0800)  SpO2: 95 % (10/09/20 1108)   Vital Signs (72h Range):  Temp:  [97.5 °F (36.4 °C)-101 °F (38.3 °C)]   Pulse:  []   Resp:  [16-24]   BP: (100-168)/(60-90)   SpO2:  [93 %-100 %]      CBC:  Recent Labs   Lab 10/07/20  1109 10/08/20  0254 10/09/20  0609   WBC 12.85* 13.98* 14.68*   RBC 4.67 4.15* 4.29*   HGB 15.1 13.7* 13.8*   HCT 43.9 41.0 42.8    236 204   MCV 94 99* 100*   MCH 32.3* 33.0* 32.2*   MCHC 34.4 33.4 32.2     CMP:     Recent Labs   Lab 10/07/20  1109 10/08/20  0254 10/09/20  0609   * 152* 119*   CALCIUM 8.5* 8.1* 8.0*   ALBUMIN 3.7 3.3* 2.8*   PROT 7.6 7.0 6.7    136 134*   K 3.1* 3.6 3.1*   CO2 25 26 23   CL 99 98 100   BUN 5* 6* 7*   CREATININE 0.8 0.8 0.7   ALKPHOS 90 77 65   ALT 19 16 14   AST 37 36 28   BILITOT 1.5* 1.7* 1.8*       Dietary Orders:  Diet Orders            Diet Cardiac: Cardiac starting at 10/07 0103            Based on the following criteria, this patient qualifies for intravenous to oral conversion:  [x] The patients gastrointestinal tract is functioning (tolerating medications via oral or enteral route for 24 hours and tolerating food or enteral feeds for 24 hours).  [x] The patient is hemodynamically stable for 24 hours (heart rate <100 beats per minute, systolic blood pressure >99 mm Hg, and respiratory rate <20 breaths per minute).      IV medication folic acid will be changed to oral medication folic acid.    Pharmacist's Name: Leticia Dukes  Pharmacist's Extension: [phone]

## 2020-10-09 NOTE — PROGRESS NOTES
"[facility] ICU 14 [facility] Medicine  Progress Note    Patient Name: Roshan Rankin  MRN: [id number]  Patient Class: IP- Inpatient   Admission Date: 10/6/2020  Length of Stay: 0 days  Attending Physician: Micky Crockett MD  Primary Care Provider: Ben Brannon MD        Subjective:     Principal Problem:Convulsion        HPI:  Roshan Rankin is a 75 y.o. male with a significant medical history of HTN, ETOH abuse (10-12 beers every day) who presents to the hospital complaining of syncopal episode.  HPI information gathered from review of the patient's medical record due to the patient being unaware of preceding events.  The patient's wife was inside the house when she heard a thud in the garage.  She went out and found the patient on his chest w/his eye closed and labored breathing.  She denies tremors or tonic clonic seizure like activity.  No bowel or urinary incontinence was reported.  The patient had blood in his mouth indicating he may have bitten his tongue.  He was not responsive to his wife's questions so she called 911.  The patient was brought to the ED for evaluation.  There was no reported history of seizures.   Shortly after arrival to the ED the patient began to return to baseline.  He did not recall falling or if he had any preceding symptoms.  The patient denied CP, SOB, HA, change in vision, dizziness, or N/V.  ED work up included labs that revealed a WBC 14.71, Na 131.  Other labs grossly normal.  Currently the patient is AA&O x3.  He is c/o right sided chest pain that he describes as "like I pulled a muscle".  He has no other complaints at this time.  Per his nurse, the patient's speed to respond has improved since his arrival in his room.  The patient is admitted to Hospital Medicine.    Overview/Hospital Course:  Mr. Rankin was admitted to hospital medicine service for evaluation of possible syncopal episodes with convulsions. In the ED, he was witnessed to have a generalized " tonic clonic lasting 10-15 seconds with tongue biting. Was given total of 2 g IV Keppra then started on 500 mg BID. CT head WO contrast unremarkable for acute intracranial pathology. Labs with leukocytosis (14) and hyponatremia (131). Patient with notable LLE cellulitis on exam, started on IV vanc. Suspect cellulitis etiology of leukocytosis. WBC increased on 10/8 and pt spiked fever of 101. Added Ceftriaxone for broader coverage and increased valium for possible etiology of fevers being alcohol withdrawal. No previous hx of seizures or similar episodes. Pt and wife did say he drank less alcohol yon day of admission (~4 beers all day), but denies recent cut back in alcohol consumption. Neuro consulted, suspect cluster of seizures (3 within 24 hr period) was provoked in the setting of hyponatremia likely due to chronic alcohol abuse. MRI w w/o contrast showed generalized volume loss and moderate degree of chronic microvascular ischemic disease. Small R thalamic lacunar infarct. Slight asymmetrical volume loss in the mesial temporal lobes R slightly greater than L. No acute intracranial pathology. 24 hr vEEG in process. Will continue IV antibiotic therapy, alcohol withdrawal treatment, and seizure precautions.    Interval History: No acute events overnight. The patient was seen at the bedside this morning with his wife present. His wife reports that the pt was more disoriented this morning than yesterday. He repeatedly took his hospital gown off and was lying in bed exposed. They note significant improvement in warmth, erythema, and pain of LLE. He complains of bilateral knee pain today, but states that it is chronic. PT/OT consulted. MRI brain completed. 24h EEG in process. Will continue seizure precautions, holding keppra for now, and IV antibiotic therapy for LLE cellulitis.     Review of Systems   Constitutional: Positive for activity change. Negative for chills and fever.   Respiratory: Negative for chest  tightness and shortness of breath.    Cardiovascular: Negative for chest pain and leg swelling.   Gastrointestinal: Negative for abdominal pain and nausea.   Musculoskeletal: Positive for arthralgias (chronic bilateral knee pain). Negative for joint swelling and neck stiffness.   Skin: Positive for color change and wound.   Neurological: Positive for tremors, seizures and syncope. Negative for dizziness, facial asymmetry, speech difficulty, weakness and light-headedness.   Psychiatric/Behavioral: Positive for confusion. The patient is not nervous/anxious.      Objective:     Vital Signs (Most Recent):  Temp: 100.3 °F (37.9 °C) (10/08/20 0800)  Pulse: 81 (10/08/20 0800)  Resp: 20 (10/08/20 0800)  BP: 138/63 (10/08/20 0800)  SpO2: 95 % (10/08/20 0400) Vital Signs (24h Range):  Temp:  [98.4 °F (36.9 °C)-100.3 °F (37.9 °C)] 100.3 °F (37.9 °C)  Pulse:  [77-81] 81  Resp:  [20-22] 20  SpO2:  [95 %] 95 %  BP: (138-141)/(63-82) 138/63     Weight: 102.1 kg (225 lb)  Body mass index is 32.28 kg/m².    Intake/Output Summary (Last 24 hours) at 10/8/2020 1419  Last data filed at 10/8/2020 0200  Gross per 24 hour   Intake 1750 ml   Output 1500 ml   Net 250 ml      Physical Exam  Vitals signs and nursing note reviewed.   Constitutional:       Appearance: He is well-developed.   Eyes:      General: No scleral icterus.     Extraocular Movements: Extraocular movements intact.   Cardiovascular:      Rate and Rhythm: Normal rate and regular rhythm.      Pulses: Normal pulses.   Pulmonary:      Effort: Pulmonary effort is normal.      Breath sounds: Normal breath sounds.   Abdominal:      General: Bowel sounds are normal.      Palpations: Abdomen is soft.      Tenderness: There is no abdominal tenderness. There is no guarding or rebound.   Musculoskeletal: Normal range of motion.         General: Swelling and tenderness present.      Comments: Swelling and erythema noted of the left lower extremity. No warmth or tenderness noted.  Improving.    Pain with ROM of knees bilaterally. No bony tenderness, deformity, or swelling noted. Chronic according to patient, but more bothersome with prolonged rest.   Skin:     General: Skin is warm and dry.      Findings: Erythema present.   Neurological:      General: No focal deficit present.      Mental Status: He is alert.      Comments: Strength exam limited in lower extremity due to pain, but able to lift both legs against gravity spontaneously.  Oriented to person, year, president. Pt believes he is in [place].  Able to follow commands.          Significant Labs: All pertinent labs within the past 24 hours have been reviewed.    Significant Imaging: I have reviewed all pertinent imaging results/findings within the past 24 hours.      Assessment/Plan:      * Convulsion  Syncope    Roshan Rankin is a 75 y.o. male with a significant medical history of HTN, ETOH abuse (10-12 beers every day) who presents to the hospital complaining of syncopal episode.  The patient was found down in his garage by his wife, unresponsive with labored respirations.  He slowly returned to baseline while in the ED. Prior to admission the patient had a witnessed episode of convulsion.  He was loaded with Keppra 1 gm.    - 2D Echo: EF 65% with normal systolic & diastolic function  -  Neurology consulted, suspect cluster of seizures (3 within 24 hr period) was provoked in the setting of hyponatremia likely due to chronic alcohol abuse  - started on Keppra 500 mg BID on admission, held per neurology recs  - 24 hr EEG in process  - MRI brain W WO contrast (epilepsy protocol):   Generalized cerebral volume loss slightly advanced for age with patchy and confluent regions of T2 FLAIR signal abnormality supratentorial white matter.   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  No evidence for acute infarction or intracranial enhancing mass lesion.  There is slight asymmetrical volume loss in the mesial temporal lobes  "right being slightly greater than left although no significant asymmetrical signal abnormality can not exclude right mesial temporal sclerosis.    - CIWA protocol as noted below  - Seizure precautions  - aspiration precautions  - neuro checks q4h    Cellulitis of left lower extremity  Leukocytosis  - redness, warmth and tenderness noted to LLE on admission; reported pain since scratching it at home a few days ago  - 1/4 SIRS on admission w/ WBC 14; afebrile & HDS  - started on IV vanc on 10/07  - pt spiked fever on 10/08- Tmax 101. WBC increased from 12.85 to 13.98. Added ceftriaxone for broader coverage  - transition to PO on d/c    Other chest pain  -Patient c/o right sided chest pain that he describes as "like I pulled a muscle".  Not reproducible on exam.  -Troponin <0.006  - BNP 97  - EKG: no acute changes    Hyponatremia  -Na 131 on admission.  - cc/hr  - improved to 133, continue to monitor with daily labs    Alcohol abuse  -Patient known to drink 10-12 beers/day.  Last ETOH just prior to syncopal episode, 4 cans on day of admission  -Monitor for s/s withdrawal  -CIWA Q8h  -Primary team to be notified if CIWA>10  -Thiamine, Folate, MVI daily  - PETH pending  - started on diazepam 5mg TID for withdrawal ppx, increased to 10 mg tid on 10/8 in setting of fever and increased confusion/agitation. titrate as needed    Essential hypertension  -Continue home Norvasc, HCTZ, metoprolol, and lasix  -Continuing home meds may compensate for s/s of ETOH withdrawals.  Other s/s such as tremors, diaphoresis, hallucinations, etc may be more reliable indicators.    Knee pain, bilateral  - pt complaining of bilateral knee pain. He reports that the pain is chronic and is an 8/10 at home at baseline. Pain has increased during hospitalization due to decreased use  - Pt reports that a provider in the past suggested a R knee replacement, but he refused surgery.  - Physical exam remarkable for pain with ROM bilaterally. No bony " tenderness to palpation, no joint swelling or erythema noted, 5/5 strength bilaterally.  - PT/OT consulted         VTE Risk Mitigation (From admission, onward)         Ordered     heparin (porcine) injection 5,000 Units  Every 8 hours      10/07/20 0109     IP VTE HIGH RISK PATIENT  Once      10/07/20 0109     Place sequential compression device  Until discontinued      10/07/20 0109                Discharge Planning   SHANE: 10/10/2020     Code Status: Full Code   Is the patient medically ready for discharge?: No    Reason for patient still in hospital (select all that apply): Patient trending condition, Treatment, Imaging and Consult recommendations  Discharge Plan A: Home with family   Discharge Delays: None known at this time            Discussed with staff, Keira.   Anuja Velasco PA-C  Department of Hospital Medicine   [facility]

## 2020-10-09 NOTE — SUBJECTIVE & OBJECTIVE
Subjective:     Interval History: NAEON. Vitals reviewed and stable. Patient's valium taper increased to 10mg TID yesterday evening due to increased confusion/agitation. IV Rocephin added for broader antibacterial coverage. Patient appears well this am; reports sleeping well. AAOx4. Patient does not appear confused this am. Currently on continuous monitoring EEG. Denies any seizures overnight. Reports improvement in LLE cellulitis, but BLE remain painful with movement/palpation. Denies any other medical complaints this am. Labs reviewed. Leukocytosis of 14. Mg/Phos mildly decreased.     Current Neurological Medications:    Current Facility-Administered Medications   Medication Dose Route Frequency Provider Last Rate Last Dose    0.9%  NaCl infusion   Intravenous Continuous Terri Verde DNP,  mL/hr at 10/09/20 0220      acetaminophen tablet 650 mg  650 mg Oral Q8H PRN Terri Verde DNP, NP   650 mg at 10/09/20 1004    albuterol-ipratropium 2.5 mg-0.5 mg/3 mL nebulizer solution 3 mL  3 mL Nebulization Q6H PRN Terri Verde DNP, NP        amLODIPine tablet 10 mg  10 mg Oral Daily Terri Verde DNP, NP   10 mg at 10/09/20 1000    aspirin EC tablet 81 mg  81 mg Oral Daily Terri Verde DNP, NP   81 mg at 10/09/20 1000    bisacodyL suppository 10 mg  10 mg Rectal Daily PRN Terri Verde DNP, NP        cefTRIAXone injection 1 g  1 g Intravenous Q24H Anuja Velasco PA-C   1 g at 10/08/20 2000    dextrose 50% injection 12.5 g  12.5 g Intravenous PRN Terri Verde DNP, NP        dextrose 50% injection 25 g  25 g Intravenous PRN Terri Verde DNP, NP        diazePAM tablet 10 mg  10 mg Oral TID Anuja Velasco PA-C   10 mg at 10/09/20 1000    folic acid 1 mg in sodium chloride 0.9% 100 mL IVPB  1 mg Intravenous Daily Terri Verde DNP,  mL/hr at 10/09/20 0900 1 mg at 10/09/20 0900    furosemide tablet 40 mg  40 mg Oral Daily Terri Verde DNP, NP    40 mg at 10/09/20 1000    glucagon (human recombinant) injection 1 mg  1 mg Intramuscular PRN Terri Verde DNP, NP        glucose chewable tablet 16 g  16 g Oral PRN Terri Verde DNP, NP        glucose chewable tablet 24 g  24 g Oral PRN Terri Verde DNP, NP        heparin (porcine) injection 5,000 Units  5,000 Units Subcutaneous Q8H Terri Dilip Verde DNP, NP   5,000 Units at 10/09/20 0600    hydroCHLOROthiazide tablet 12.5 mg  12.5 mg Oral Daily eTrri Verde DNP, NP        HYDROcodone-acetaminophen 5-325 mg per tablet 1 tablet  1 tablet Oral Once PRN Anuja Velasco PA-C        LORazepam tablet 2 mg  2 mg Oral Q4H PRN Loretta Robles PA-C        melatonin tablet 6 mg  6 mg Oral Nightly PRN Terri Verde DNP, NP   6 mg at 10/07/20 2159    metoprolol tartrate (LOPRESSOR) tablet 100 mg  100 mg Oral BID Terri Verde DNP, NP   100 mg at 10/09/20 1000    multivitamin tablet  1 tablet Oral Daily Terri Verde DNP, NP   1 tablet at 10/09/20 1000    ondansetron disintegrating tablet 8 mg  8 mg Oral Q8H PRN Terri Verde DNP, NP        ondansetron injection 4 mg  4 mg Intravenous Q8H PRN Terri Verde DNP, NP        polyethylene glycol packet 17 g  17 g Oral Daily Terri Verde DNP, NP        potassium bicarbonate disintegrating tablet 25 mEq  25 mEq Oral Once Anuja Velasco PA-C        sodium phosphate 15 mmol in dextrose 5 % 250 mL IVPB  15 mmol Intravenous Once Anuja Velasco PA-C 62.5 mL/hr at 10/09/20 1010 15 mmol at 10/09/20 1010    thiamine tablet 100 mg  100 mg Oral Daily Terri Verde DNP, NP   100 mg at 10/09/20 1000    vancomycin 1.5 g in dextrose 5 % 250 mL IVPB (ready to mix)  1,500 mg Intravenous Q12H [place] Mayorga .7 mL/hr at 10/09/20 0200 1,500 mg at 10/09/20 0200       Review of Systems   Review of Systems   Constitutional: Negative for fever, chills.   HENT: Negative for trouble swallowing and voice change.     Eyes: Negative for visual disturbance.   Respiratory: Negative for shortness of breath.    Cardiovascular: Negative for chest pain.   Gastrointestinal: Negative for nausea and vomiting.   Musculoskeletal: Positive for arthralgias and myalgias. Negative for neck stiffness.   Skin: Positive for color change, improving.   Neurological: Positive for weakness in BLE. Negative for dizziness, facial asymmetry, speech difficulty, numbness and headaches.   Psychiatric/Behavioral: Negative for confusion, disorientation, behavioral disturbance. Negative for agitation. AAOx4      Objective:     Vital Signs (Most Recent):  Temp: 99.8 °F (37.7 °C) (10/09/20 0800)  Pulse: 102 (10/09/20 0801)  Resp: 20 (10/09/20 0800)  BP: (!) 144/72 (10/09/20 0800)  SpO2: 100 % (10/08/20 2100) Vital Signs (24h Range):  Temp:  [99.1 °F (37.3 °C)-101 °F (38.3 °C)] 99.8 °F (37.7 °C)  Pulse:  [] 102  Resp:  [20-24] 20  SpO2:  [99 %-100 %] 100 %  BP: (144-147)/(65-72) 144/72     Weight: 102.1 kg (225 lb)  Body mass index is 32.28 kg/m².    Physical Exam  NEUROLOGICAL EXAMINATION:      MENTAL STATUS   Oriented to person.   Oriented to place, city  Oriented to date, day. Oriented to year and month.   Follows 1 step commands.   Attention: Normal. Concentration: Normal.   Speech: speech is normal   Level of consciousness: alert, AAOx4      Able to name wife at bedside      CRANIAL NERVES      CN III, IV, VI   Pupils are equal, round, and reactive to light.  Extraocular motions are normal.   Nystagmus: none   Diplopia: none  Ophthalmoparesis: none     CN V   Facial sensation intact.      CN VII   Facial expression full, symmetric.      CN VIII   Hearing: intact     CN IX, X   Palate: symmetric     CN XI   CN XI normal.      CN XII   CN XII normal.      MOTOR EXAM   Muscle bulk: normal  Overall muscle tone: normal  Right arm pronator drift: absent  Left arm pronator drift: absent     Strength   Right biceps: 5/5  Left biceps: 5/5  Right triceps:  5/5  Left triceps: 5/5  Right interossei: 5/5  Left interossei: 5/5       Strength exam limited in lower extremity due to pain but able to lift both legs against gravity spontaneously    No abnormal movements noted       SENSORY EXAM   Right arm light touch: normal  Left arm light touch: normal  Right leg light touch: normal  Left leg light touch: normal     GAIT AND COORDINATION      Gait  Gait: (deferred)     Coordination   Finger to nose coordination: normal     Tremor   Resting tremor: absent       Significant Labs: All pertinent lab results from the past 24 hours have been reviewed.    Significant Imaging: I have reviewed all pertinent imaging results/findings within the past 24 hours.

## 2020-10-09 NOTE — PROCEDURES
EEG REPORT      Roshan Rankin  [id number]  1945    DATE OF SERVICE: 10/9/2020      -2    METHODOLOGY      Extended electroencephalographic recording is made while the patient is ambulatory and continuing normal daily activities.  Electrodes are placed according to the International 10-20 placement system and included T1 and T2 electrode placement.  Twenty four (24) channels of digital signal (sampling rate of 512/sec) was simultaneously recorded from the scalp including EKG and eye monitors.  Recording band pass was 0.1 to 100 hz and all data was stored digitally on the recorder.  The patient is instructed to press an event button when clinical symptoms occur and write the symptoms into a diary. Activation procedures which include photic stimulation, hyperventilation and instructing patients to perform simple task are done in selected patients.        The EEG is displayed on a monitor screen and can be reformatted into different montages for evaluation.  The entire recoding is submitted for computer assisted analysis to detect spike and electrographic seizure activity.  The entire recording is visually reviewed and the times identified by computer analysis as being spikes or seizures are reviewed again.  Compresses spectral analysis (CSA) is also performed on the activity recorded from each individual channel.  This is displayed as a power display of frequencies from 0 to 30 Hz over time.   The CSA analysis is done and displayed continuously.  This is reviewed for asymmetries in power between homologous areas of the scalp and for presence of changes in power which canbe seen when seizures occur.  Sections of suspected abnormalities on the CSA is then compared with the original EEG recording.  .     [employer] software was also utilized in the review of this study.  This software suite analyzes the EEG recording in multiple domains.  Coherence and rhythmicity is computed to identify EEG sections which may  contain organized seizures.  Each channel undergoes analysis to detect presence of spike and sharp waves which have special and morphological characteristic of epileptic activity.  The routine EEG recording is converted from spacial into frequency domain.  This is then displayed comparing homologous areas to identify areas of significant asymmetry.  Algorithm to identify non-cortically generated artifact is used to separate eye movement, EMG and other artifact from the EEG     Recording Times  Start on 10/9/2020 at 07:00:55  Stop on 10/9/2020 at 15:57:21    A total of 8:50:56 hours of EEG was recorded.      EEG FINDINGS:  Background activity:   The background rhythm was characterized by theta and alpha activity with a 7-8Hz posterior dominant alpha rhythm at 30-70 microvolts bilaterally.   Symmetry and continuity: the background was continuous and symmetric although there was very poor lead contact in F8, T2 precluding evaluation of focal activity in the right frontotemporal region.     Sleep:   Normal sleep transients including sleep spindles, K complexes, vertex waves were seen.    Abnormal activity:   No epileptiform discharges, periodic discharges, lateralized rhythmic delta activity or electrographic seizures were seen.    IMPRESSION:   This is an abnormal EEG due to the finding of a mild, generalized, non-specific cerebral dysfunction.  Assessment of activity in the right temporal lobe is significantly limited, recommend continuation based on clinical suspicion.      Claudy Fisher MD  Neurology-Epilepsy.  [facility].

## 2020-10-09 NOTE — PT/OT/SLP EVAL
"Occupational Therapy   Evaluation    Name: Roshan Rankin  MRN: [id number]  Admitting Diagnosis:  Convulsion      Recommendations:     Discharge Recommendations: rehabilitation facility  Discharge Equipment Recommendations:  (TBD)  Barriers to discharge:       Assessment:     Roshan Rankin is a 75 y.o. male with a medical diagnosis of Convulsion.  Patient limited in evaluation today secondary to pain in bilateral legs. Patient reported increased pain in right knee. Patient completed bed mobility at max assistance and was unable to come to standing. He required max assistance for lower body dressing. Patient would benefit from skilled OT needs while in acute care to increase independence with ADLs and ADL transfers. At this time he would benefit from rehab at discharge to return back to previous level of independence.  Performance deficits affecting function: weakness, impaired self care skills, impaired functional mobilty, gait instability, impaired balance, decreased lower extremity function, pain, decreased ROM, edema.      Rehab Prognosis: Good; patient would benefit from acute skilled OT services to address these deficits and reach maximum level of function.       Plan:     Patient to be seen 4 x/week to address the above listed problems via self-care/home management, therapeutic activities, therapeutic exercises  · Plan of Care Expires: 11/09/20  · Plan of Care Reviewed with: patient, spouse    Subjective     Chief Complaint: "pain in legs/left knee"   Patient/Family Comments/goals: "get back function"     Occupational Profile:  Living Environment: lives with wife in 1 level home with no steps, walk in shower with bench, higher toilet   Previous level of function: independent with ADLs, reported two recent falls   Roles and Routines: Retired   Equipment Used at Home:  bath bench  Assistance upon Discharge: wife: wife works 3 days during the week     Pain/Comfort:  · Pain Rating 1: (reported pain in bilateral " legs)  · Pain Addressed 1: Reposition, Distraction    Patients cultural, spiritual, Evangelical conflicts given the current situation: no    Objective:     Co-Eval with physical therapy to max functional potential  Communicated with: RN prior to session.  Patient found supine with EEG, peripheral IV upon OT entry to room.    General Precautions: Standard, fall, seizure   Orthopedic Precautions:N/A   Braces: N/A     Occupational Performance:    Bed Mobility:    · Patient completed Rolling/Turning to Left with  maximal assistance  · Patient completed Rolling/Turning to Right with maximal assistance  · Patient completed Scooting to the head of the bed along with Bridging with maximal assistance  · Patient completed Supine to Sit with maximal assistance  · Patient completed Sit to Supine with maximal assistance    Functional Mobility/Transfers:  · Patient attempted to completed standing from bed level with maximal assistance and of 2 persons  with  no assistive device, patient unable to complete stand secondary to pain     Activities of Daily Living:  · Lower Body Dressing: maximal assistance for the following task    · sitting edge of bed to don bilateral socks  · Sitting edge of bed to thread bilateral legs through underwear   · Supine in bed with bridging and rolling to don/pull up underwear     Cognitive/Visual Perceptual:  Cognitive/Psychosocial Skills:     -       Oriented to: Person, Place and Time   -       Follows Commands/attention:Follows multistep  commands    Physical Exam: wife reported patient was complaining of shoulder pain: patient able to move through functional range without reporting bilateral   Upper Extremity Range of Motion:     -       Right Upper Extremity: WFL  -       Left Upper Extremity: WFL  Upper Extremity Strength:    -       Right Upper Extremity: WFL  -       Left Upper Extremity: WFL    AMPAC 6 Click ADL:  AMPAC Total Score: 16    Treatment & Education:  -Patient with good sitting  balance while sitting edge of bed  -Patient educated on bringing foot up to opposite knee for lower body dressing   -Patient educated on role of OT and plan of care while in acute care   Education:    Patient left with bed in chair position with all lines intact, call button in reach, RN notified and wife present    GOALS:   Multidisciplinary Problems     Occupational Therapy Goals        Problem: Occupational Therapy Goal    Goal Priority Disciplines Outcome Interventions   Occupational Therapy Goal     OT, PT/OT Ongoing, Progressing    Description: Goals to be met by: 11-9-20    Patient will increase functional independence with ADLs by performing:    UE Dressing with Modified [place].  LE Dressing with Supervision.  Grooming while standing at sink with Supervision.  Toileting from toilet with Supervision for hygiene and clothing management.   Toilet transfer to toilet with Supervision.                     History:     Past Medical History:   Diagnosis Date    Alcohol abuse     Arthritis     Convulsion 10/7/2020    GERD (gastroesophageal reflux disease)     Glaucoma     Hypertension        Past Surgical History:   Procedure Laterality Date    KNEE CARTILAGE SURGERY Right        Time Tracking:     OT Date of Treatment: 10/09/20  OT Start Time: 1100  OT Stop Time: 1134  OT Total Time (min): 34 min    Billable Minutes:Evaluation 10  Self Care/Home Management 15  Therapeutic Activity 9    Shanta Lynn OT  10/9/2020

## 2020-10-09 NOTE — PLAN OF CARE
Patient medically not stable for discharge.  His wife reports that the pt was more disoriented.  PT/OT consulted. MRI brain completed. 24h EEG in process. Will continue seizure precautions, holding keppra for now, and IV antibiotic therapy for LLE cellulitis.    CM to follow for discharge planning needs.  DENISE Zavala RN  Case Management  [id number]        10/09/20 1007   Discharge Reassessment   Assessment Type Discharge Planning Reassessment   Provided patient/caregiver education on the expected discharge date and the discharge plan Yes   Do you have any problems affording any of your prescribed medications? TBD   Discharge Plan A Home;Home with family   Discharge Plan B Home with family;Home Health   DME Needed Upon Discharge  other (see comments)  (TBD)   Anticipated Discharge Disposition Home   Can the patient/caregiver answer the patient profile reliably? No, cognitively impaired  (Per his wife - confusion and disorientation)   Describe the patient's ability to walk at the present time. Minor restrictions or changes   Post-Acute Status   Post-Acute Authorization Other   Other Status No Post-Acute Service Needs   Discharge Delays None known at this time

## 2020-10-09 NOTE — PROCEDURES
EEG REPORT      Roshan Rankin  [id number]  1945    DATE OF SERVICE: 10/8/2020          METHODOLOGY      Extended electroencephalographic recording is made while the patient is ambulatory and continuing normal daily activities.  Electrodes are placed according to the International 10-20 placement system and included T1 and T2 electrode placement.  Twenty four (24) channels of digital signal (sampling rate of 512/sec) was simultaneously recorded from the scalp including EKG and eye monitors.  Recording band pass was 0.1 to 100 hz and all data was stored digitally on the recorder.  The patient is instructed to press an event button when clinical symptoms occur and write the symptoms into a diary. Activation procedures which include photic stimulation, hyperventilation and instructing patients to perform simple task are done in selected patients.        The EEG is displayed on a monitor screen and can be reformatted into different montages for evaluation.  The entire recoding is submitted for computer assisted analysis to detect spike and electrographic seizure activity.  The entire recording is visually reviewed and the times identified by computer analysis as being spikes or seizures are reviewed again.  Compresses spectral analysis (CSA) is also performed on the activity recorded from each individual channel.  This is displayed as a power display of frequencies from 0 to 30 Hz over time.   The CSA analysis is done and displayed continuously.  This is reviewed for asymmetries in power between homologous areas of the scalp and for presence of changes in power which canbe seen when seizures occur.  Sections of suspected abnormalities on the CSA is then compared with the original EEG recording.  .     [employer] software was also utilized in the review of this study.  This software suite analyzes the EEG recording in multiple domains.  Coherence and rhythmicity is computed to identify EEG sections which may  contain organized seizures.  Each channel undergoes analysis to detect presence of spike and sharp waves which have special and morphological characteristic of epileptic activity.  The routine EEG recording is converted from spacial into frequency domain.  This is then displayed comparing homologous areas to identify areas of significant asymmetry.  Algorithm to identify non-cortically generated artifact is used to separate eye movement, EMG and other artifact from the EEG     Recording Times  Start on 10/8/2020 at 10:24:52  Stop on 10/9/2020 at 07:00:08    A total of 20:30:33 hours of EEG was recorded.      EEG FINDINGS:  Background activity:   The background rhythm was characterized by theta and alpha activity with a 7Hz posterior dominant alpha rhythm at 30-70 microvolts bilaterally.   Symmetry and continuity: the background was continuous and symmetric although there was very poor lead contact in F8, T2 precluding evaluation of focal activity in the right frontotemporal region.     Sleep:   Normal sleep transients including sleep spindles, K complexes, vertex waves were seen.    Abnormal activity:   No epileptiform discharges, periodic discharges, lateralized rhythmic delta activity or electrographic seizures were seen.    IMPRESSION:   This is an abnormal EEG due to the finding of a mild, generalized, non-specific cerebral dysfunction.  Assessment of activity in the right temporal lobe is significantly limited, recommend continuation based on clinical suspicion.      Claudy Fisher MD  Neurology-Epilepsy.  [facility].

## 2020-10-09 NOTE — ASSESSMENT & PLAN NOTE
Reports drinking 10-12 (12 oz) beers daily   PETH pending    --continue thiamine, folate and multivitamins qd  --Continue Valium current valium taper

## 2020-10-09 NOTE — PROGRESS NOTES
Pharmacokinetic Assessment Follow Up: IV Vancomycin    Vancomycin serum concentration assessment(s):    The trough level was drawn correctly and can be used to guide therapy at this time. The measurement is within the desired definitive target range of 10 to 20 mcg/mL.    Vancomycin Regimen Plan:    Continue regimen to Vancomycin 1500 mg IV every 12 hours with next serum trough concentration measured at 1300 prior to 4th dose on 10/10    Drug levels (last 3 results):  Recent Labs   Lab Result Units 10/09/20  0052   Vancomycin-Trough ug/mL 14.1       Pharmacy will continue to follow and monitor vancomycin.    Please contact pharmacy at extension [phone] for questions regarding this assessment.    Thank you for the consult,   Rito Preciado       Patient brief summary:  Roshan Rankin is a 75 y.o. male initiated on antimicrobial therapy with IV Vancomycin for treatment of skin & soft tissue infection    The patient's current regimen is 1500mg every 12 hours    Drug Allergies:   Review of patient's allergies indicates:  No Known Allergies    Actual Body Weight:   102.1kg    Renal Function:   Estimated Creatinine Clearance: 95.5 mL/min (based on SCr of 0.8 mg/dL).,     Dialysis Method (if applicable):  N/A    CBC (last 72 hours):  Recent Labs   Lab Result Units 10/06/20  2146 10/07/20  0434 10/07/20  1109 10/08/20  0254   WBC K/uL 14.71* 14.74* 12.85* 13.98*   Hemoglobin g/dL 15.0 14.7 15.1 13.7*   Hemoglobin A1C %  --  5.5  --   --    Hematocrit % 43.1 42.0 43.9 41.0   Platelets K/uL 271 255 261 236   Gran% % 83.1* 80.2* 78.2* 74.6*   Lymph% % 6.4* 8.7* 7.8* 10.0*   Mono% % 9.7 10.4 12.8 14.5   Eosinophil% % 0.1 0.0 0.2 0.1   Basophil% % 0.3 0.2 0.3 0.3   Differential Method  Automated Automated Automated Automated       Metabolic Panel (last 72 hours):  Recent Labs   Lab Result Units 10/06/20  2131 10/06/20  2146 10/07/20  0434 10/07/20  1109 10/08/20  0254   Sodium mmol/L  --  131* 134* 136 136   Potassium mmol/L  --   3.5 3.1* 3.1* 3.6   Chloride mmol/L  --  95 97 99 98   CO2 mmol/L  --  24 21* 25 26   Glucose mg/dL  --  164* 125* 116* 152*   Glucose, UA  Negative  --   --   --   --    BUN, Bld mg/dL  --  7* 6* 5* 6*   Creatinine mg/dL  --  0.9 0.8 0.8 0.8   Albumin g/dL  --  3.9 3.7 3.7 3.3*   Total Bilirubin mg/dL  --  1.1* 1.3* 1.5* 1.7*   Alkaline Phosphatase U/L  --  94 91 90 77   AST U/L  --  33 39 37 36   ALT U/L  --  21 20 19 16   Magnesium mg/dL  --   --  2.1  --  2.0   Phosphorus mg/dL  --   --  3.0  --  2.4*       Vancomycin Administrations:  vancomycin given in the last 96 hours                   vancomycin 1.5 g in dextrose 5 % 250 mL IVPB (ready to mix) (mg) 1,500 mg New Bag 10/09/20 0200     1,500 mg New Bag 10/08/20 1530     1,500 mg New Bag  0200    vancomycin 2 g in dextrose 5 % 500 mL IVPB (mg) 2,000 mg New Bag 10/07/20 1500                Microbiologic Results:  Microbiology Results (last 7 days)     Procedure Component Value Units Date/Time    Blood culture [[id number]] Collected: 10/07/20 1110    Order Status: Completed Specimen: Blood from Antecubital, Right Hand Updated: 10/08/20 1412     Blood Culture, Routine No Growth to date      No Growth to date    Blood culture [[id number]] Collected: 10/07/20 1110    Order Status: Completed Specimen: Blood from Antecubital, Right Arm Updated: 10/08/20 1412     Blood Culture, Routine No Growth to date      No Growth to date

## 2020-10-09 NOTE — PLAN OF CARE
Problem: Occupational Therapy Goal  Goal: Occupational Therapy Goal  Description: Goals to be met by: 11-9-20    Patient will increase functional independence with ADLs by performing:    UE Dressing with Modified [place].  LE Dressing with Supervision.  Grooming while standing at sink with Supervision.  Toileting from toilet with Supervision for hygiene and clothing management.   Toilet transfer to toilet with Supervision.    Outcome: Ongoing, Progressing    OT evaluation with goals established. Patient max assistance for bed mobility.    Shanta Lynn, OT  10/9/2020

## 2020-10-09 NOTE — ASSESSMENT & PLAN NOTE
- pt complaining of bilateral knee pain. He reports that the pain is chronic and is an 8/10 at home on a normal basis. Pain has increased during hospitalization due to decreased use  - Pt reports that a provider in the past suggested a R knee replacement, but he refused surgery.  - Physical exam remarkable for pain with ROM bilaterally. No bony tenderness to palpation, no joint swelling or erythema noted, 5/5 strength bilaterally.  - PT/OT consulted

## 2020-10-09 NOTE — PT/OT/SLP EVAL
Physical Therapy Evaluation    Patient Name:  Roshan Rankin   MRN:  [id number]    Recommendations:     Discharge Recommendations:  rehabilitation facility   Discharge Equipment Recommendations: other (see comments)(TBD)   Barriers to discharge: Decreased caregiver support    Assessment:     Roshan Rankin is a 75 y.o. male admitted with a medical diagnosis of Convulsion.  He presents with the following impairments/functional limitations:  weakness, impaired endurance, impaired self care skills, impaired functional mobilty, gait instability, impaired balance, decreased lower extremity function, decreased upper extremity function, decreased safety awareness, pain, decreased ROM. Eval completed with OT. Patient pleasant and willing to participate in therapy. Alert and oriented but altered. Delayed responses, seems mildly confused, with decreased motor planning and requires redirecting to tasks. Patient with history of bilateral knee OA with pain. Mobility primarily limited by severe R knee pain despite good effort from patient. MaxA x 2 for bed mobiliy and sit to stand attempt. This patient is an excellent candidate for inpatient rehabilitation, has a qualifying diagnosis, shows increasing activity tolerance,  is motivated to participate in treatment, and has a supportive family willing to assist the patient upon discharge.     Rehab Prognosis: Good; patient would benefit from acute skilled PT services to address these deficits and reach maximum level of function.    Recent Surgery: * No surgery found *      Plan:     During this hospitalization, patient to be seen 4 x/week to address the identified rehab impairments via gait training, therapeutic activities, therapeutic exercises, neuromuscular re-education and progress toward the following goals:    · Plan of Care Expires:  11/08/20    Subjective     Chief Complaint: R knee pain  Patient/Family Comments/goals: wife and patient open and agreeable to inpatient  rehab  Pain/Comfort:  · Pain Rating 1: 9/10(with any movement)  · Location - Side 1: Right  · Location 1: knee  · Pain Addressed 1: Reposition, Cessation of Activity, Distraction, Nurse notified  · Pain Rating Post-Intervention 1: 9/10    Patients cultural, spiritual, Bahai conflicts given the current situation: no    Living Environment:  Lives with wife in 1SH with no KE, walk in shower with bench.   Prior to admission, patients level of function was Independent with no DME. Retired from [employer]. 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 Denies any falls in addition to 2 recent falls just prior to admission to hopsital  Equipment used at home: none.  DME owned (not currently used): none.  Upon discharge, patient will have assistance from wife.    Objective:     Communicated with nurse prior to session.  Patient found HOB elevated with blood pressure cuff, telemetry, pulse ox (continuous), peripheral IV, EEG  upon PT entry to room.    General Precautions: Standard, fall   Orthopedic Precautions:N/A   Braces: N/A     Exams:  · RLE ROM: moderately decreased knee extenstion, flexion and DF  · RLE Strength: Grossly 4-/5, DF 2/5  · LLE ROM: minimally decreased knee extension and DF  · LLE Strength: Grossly 4-/5    Functional Mobility:  · Bed Mobility:     · Rolling Left:  maximal assistance  · Rolling Right: maximal assistance  · Scooting: maximal assistance and of 2 persons  · Bridging: maximal assistance and of 2 persons  · Supine to Sit: maximal assistance and of 2 persons  · Sit to Supine: maximal assistance and of 2 persons  · Transfers:     · Sit to Stand:  maximal assistance and of 2 persons with hand-held assist, minimal hip clearance achieved 2* severe pain in R knee. Further out of bed mobility declined and patient returned to bed.     Therapeutic Activities and Exercises:   Patient educated on role of PT in the hospital.   Pt educated on plan and goals with  physical therapy.   Pt educated on importance of OOB activity to decrease the risks associated with bed rest.   Instruction for bed mobility and sit to stand transfer.   10x each AP, GS, QS, HS- instructed to complete 4-5x/daily  Educated patient's wife on gentle patellar mobilizations and knee ROM for pain relief and improved joint mobility.   All questions and concerns addressed within PT scope of practice.     AM-PAC 6 CLICK MOBILITY  Total Score:10     Patient left with bed in chair position with all lines intact, call button in reach, nurse notified and wife present.    GOALS:   Multidisciplinary Problems     Physical Therapy Goals        Problem: Physical Therapy Goal    Goal Priority Disciplines Outcome Goal Variances Interventions   Physical Therapy Goal     PT, PT/OT Ongoing, Progressing     Description: Goals to be met by: 10/26/2020     Patient will increase functional independence with mobility by performin. Supine to sit with Contact Guard Assistance  2. Sit to supine with Contact Guard Assistance  3. Sit to stand transfer with Minimal Assistance  4. Bed to chair transfer with Minimal Assistance using LRAD.  5. Gait  x 25 feet with Minimal Assistance using LRAD.    6. Lower extremity exercise program x30 reps per handout, with independence                     History:     Past Medical History:   Diagnosis Date    Alcohol abuse     Arthritis     Convulsion 10/7/2020    GERD (gastroesophageal reflux disease)     Glaucoma     Hypertension        Past Surgical History:   Procedure Laterality Date    KNEE CARTILAGE SURGERY Right        Time Tracking:     PT Received On: 10/09/20  PT Start Time: 1101     PT Stop Time: 1135  PT Total Time (min): 34 min     Billable Minutes: Evaluation 9, Therapeutic Activity 13 and Therapeutic Exercise 12      Talia Thomson, PT  10/09/2020

## 2020-10-09 NOTE — ASSESSMENT & PLAN NOTE
75 y.o. male with HTN and alcohol abuse presented to ED 10/6 after LOC episodes at home. Wife found pt lying on the floor of the garage with abnormally loud respirations and decreased awareness both times. In the ED, he was witnessed to have a GTC lasting 10-15 seconds with tongue biting. Was given total of 2 g IV Keppra then started on 500 mg BID. CT head WO contrast unremarkable for acute intracranial pathology. Labs with leukocytosis and hyponatremia (131). No previous hx of seizures or similar episodes. Pt and wife did say he drank less alcohol yesterday (~4 beers all day), but denies recent cut back in alcohol consumption.     >>Suspect cluster of seizures (3 within 24 hr period) was provoked in the setting of hyponatremia likely due to chronic alcohol abuse. EEG monitoring does not demonstrate epileptiform activity.     10/8--MRI brain W WO contrast completed with generalized volume loss and moderate degree of chronic microvascular ischemic disease. Small R thalamic lacunar infarct. Slight asymmetrical volume loss in the mesial temporal lobes R slightly greater than L although no significant asymmetrical signal abnormality can not exclude right mesial temporal sclerosis. No acute intracranial pathology.     Recommendations:  --No AEDs recommended at this time, as seizure likely provoked in the setting of alcohol withdrawal  --EEG does not shows mild, generalized, non-specific cerebral dysfunction.  Assessment of activity in the right temporal lobe is significantly limited. Clear to discontinue continuous EEG monitoring at this time.   --Continue Valium Taper as per primary team recs  --Neurology to sign off. Thank you for the consult.

## 2020-10-10 LAB
ALBUMIN SERPL BCP-MCNC: 2.4 G/DL (ref 3.5–5.2)
ALP SERPL-CCNC: 70 U/L (ref 55–135)
ALT SERPL W/O P-5'-P-CCNC: 17 U/L (ref 10–44)
ANION GAP SERPL CALC-SCNC: 11 MMOL/L (ref 8–16)
AST SERPL-CCNC: 29 U/L (ref 10–40)
BASOPHILS # BLD AUTO: 0.03 K/UL (ref 0–0.2)
BASOPHILS NFR BLD: 0.3 % (ref 0–1.9)
BILIRUB SERPL-MCNC: 1.5 MG/DL (ref 0.1–1)
BUN SERPL-MCNC: 8 MG/DL (ref 8–23)
CALCIUM SERPL-MCNC: 8 MG/DL (ref 8.7–10.5)
CHLORIDE SERPL-SCNC: 97 MMOL/L (ref 95–110)
CO2 SERPL-SCNC: 23 MMOL/L (ref 23–29)
CREAT SERPL-MCNC: 0.8 MG/DL (ref 0.5–1.4)
DIFFERENTIAL METHOD: ABNORMAL
EOSINOPHIL # BLD AUTO: 0 K/UL (ref 0–0.5)
EOSINOPHIL NFR BLD: 0.2 % (ref 0–8)
ERYTHROCYTE [DISTWIDTH] IN BLOOD BY AUTOMATED COUNT: 12.4 % (ref 11.5–14.5)
EST. GFR  (AFRICAN AMERICAN): >60 ML/MIN/1.73 M^2
EST. GFR  (NON AFRICAN AMERICAN): >60 ML/MIN/1.73 M^2
GLUCOSE SERPL-MCNC: 122 MG/DL (ref 70–110)
HCT VFR BLD AUTO: 40 % (ref 40–54)
HGB BLD-MCNC: 13 G/DL (ref 14–18)
IMM GRANULOCYTES # BLD AUTO: 0.07 K/UL (ref 0–0.04)
IMM GRANULOCYTES NFR BLD AUTO: 0.6 % (ref 0–0.5)
LYMPHOCYTES # BLD AUTO: 1.6 K/UL (ref 1–4.8)
LYMPHOCYTES NFR BLD: 14 % (ref 18–48)
MAGNESIUM SERPL-MCNC: 1.9 MG/DL (ref 1.6–2.6)
MCH RBC QN AUTO: 31.6 PG (ref 27–31)
MCHC RBC AUTO-ENTMCNC: 32.5 G/DL (ref 32–36)
MCV RBC AUTO: 97 FL (ref 82–98)
MONOCYTES # BLD AUTO: 1.6 K/UL (ref 0.3–1)
MONOCYTES NFR BLD: 14.4 % (ref 4–15)
NEUTROPHILS # BLD AUTO: 8 K/UL (ref 1.8–7.7)
NEUTROPHILS NFR BLD: 70.5 % (ref 38–73)
NRBC BLD-RTO: 0 /100 WBC
PHOSPHATE SERPL-MCNC: 2.6 MG/DL (ref 2.7–4.5)
PLATELET # BLD AUTO: 217 K/UL (ref 150–350)
PMV BLD AUTO: 11.5 FL (ref 9.2–12.9)
POTASSIUM SERPL-SCNC: 3.3 MMOL/L (ref 3.5–5.1)
PROT SERPL-MCNC: 6.6 G/DL (ref 6–8.4)
RBC # BLD AUTO: 4.11 M/UL (ref 4.6–6.2)
SODIUM SERPL-SCNC: 131 MMOL/L (ref 136–145)
VANCOMYCIN TROUGH SERPL-MCNC: 16.4 UG/ML (ref 10–22)
WBC # BLD AUTO: 11.28 K/UL (ref 3.9–12.7)

## 2020-10-10 PROCEDURE — 99233 SBSQ HOSP IP/OBS HIGH 50: CPT | Mod: ,,, | Performed by: PHYSICIAN ASSISTANT

## 2020-10-10 PROCEDURE — 80053 COMPREHEN METABOLIC PANEL: CPT

## 2020-10-10 PROCEDURE — 63600175 PHARM REV CODE 636 W HCPCS: Performed by: PHYSICIAN ASSISTANT

## 2020-10-10 PROCEDURE — 36415 COLL VENOUS BLD VENIPUNCTURE: CPT

## 2020-10-10 PROCEDURE — 63600175 PHARM REV CODE 636 W HCPCS: Performed by: NURSE PRACTITIONER

## 2020-10-10 PROCEDURE — 11000001 HC ACUTE MED/SURG PRIVATE ROOM

## 2020-10-10 PROCEDURE — 99233 PR SUBSEQUENT HOSPITAL CARE,LEVL III: ICD-10-PCS | Mod: ,,, | Performed by: PHYSICIAN ASSISTANT

## 2020-10-10 PROCEDURE — 85025 COMPLETE CBC W/AUTO DIFF WBC: CPT

## 2020-10-10 PROCEDURE — 99223 PR INITIAL HOSPITAL CARE,LEVL III: ICD-10-PCS | Mod: ,,, | Performed by: PHYSICIAN ASSISTANT

## 2020-10-10 PROCEDURE — 25000003 PHARM REV CODE 250: Performed by: INTERNAL MEDICINE

## 2020-10-10 PROCEDURE — 25000003 PHARM REV CODE 250: Performed by: NURSE PRACTITIONER

## 2020-10-10 PROCEDURE — 83735 ASSAY OF MAGNESIUM: CPT

## 2020-10-10 PROCEDURE — 25000003 PHARM REV CODE 250: Performed by: PHYSICIAN ASSISTANT

## 2020-10-10 PROCEDURE — 25000003 PHARM REV CODE 250: Performed by: HOSPITALIST

## 2020-10-10 PROCEDURE — 99223 1ST HOSP IP/OBS HIGH 75: CPT | Mod: ,,, | Performed by: PHYSICIAN ASSISTANT

## 2020-10-10 PROCEDURE — 84100 ASSAY OF PHOSPHORUS: CPT

## 2020-10-10 PROCEDURE — 63600175 PHARM REV CODE 636 W HCPCS: Performed by: INTERNAL MEDICINE

## 2020-10-10 PROCEDURE — 80202 ASSAY OF VANCOMYCIN: CPT

## 2020-10-10 RX ORDER — DIAZEPAM 5 MG/1
5 TABLET ORAL 3 TIMES DAILY
Status: DISCONTINUED | OUTPATIENT
Start: 2020-10-10 | End: 2020-10-12

## 2020-10-10 RX ORDER — CEFTRIAXONE 1 G/1
1 INJECTION, POWDER, FOR SOLUTION INTRAMUSCULAR; INTRAVENOUS
Status: DISCONTINUED | OUTPATIENT
Start: 2020-10-10 | End: 2020-10-12

## 2020-10-10 RX ADMIN — HEPARIN SODIUM 5000 UNITS: 5000 INJECTION INTRAVENOUS; SUBCUTANEOUS at 10:10

## 2020-10-10 RX ADMIN — FOLIC ACID 1 MG: 1 TABLET ORAL at 08:10

## 2020-10-10 RX ADMIN — THERA TABS 1 TABLET: TAB at 08:10

## 2020-10-10 RX ADMIN — CEFTRIAXONE SODIUM 1 G: 1 INJECTION, POWDER, FOR SOLUTION INTRAMUSCULAR; INTRAVENOUS at 08:10

## 2020-10-10 RX ADMIN — FUROSEMIDE 40 MG: 40 TABLET ORAL at 08:10

## 2020-10-10 RX ADMIN — Medication 100 MG: at 08:10

## 2020-10-10 RX ADMIN — METOPROLOL TARTRATE 100 MG: 100 TABLET ORAL at 08:10

## 2020-10-10 RX ADMIN — VANCOMYCIN HYDROCHLORIDE 1500 MG: 1.5 INJECTION, POWDER, LYOPHILIZED, FOR SOLUTION INTRAVENOUS at 01:10

## 2020-10-10 RX ADMIN — VANCOMYCIN HYDROCHLORIDE 1500 MG: 1.5 INJECTION, POWDER, LYOPHILIZED, FOR SOLUTION INTRAVENOUS at 02:10

## 2020-10-10 RX ADMIN — SODIUM PHOSPHATE, MONOBASIC, MONOHYDRATE AND SODIUM PHOSPHATE, DIBASIC, ANHYDROUS 15 MMOL: 276; 142 INJECTION, SOLUTION INTRAVENOUS at 10:10

## 2020-10-10 RX ADMIN — ASPIRIN 81 MG: 81 TABLET, COATED ORAL at 08:10

## 2020-10-10 RX ADMIN — PIPERACILLIN AND TAZOBACTAM 4.5 G: 4; .5 INJECTION, POWDER, LYOPHILIZED, FOR SOLUTION INTRAVENOUS; PARENTERAL at 12:10

## 2020-10-10 RX ADMIN — POTASSIUM BICARBONATE 50 MEQ: 978 TABLET, EFFERVESCENT ORAL at 08:10

## 2020-10-10 RX ADMIN — PIPERACILLIN AND TAZOBACTAM 4.5 G: 4; .5 INJECTION, POWDER, LYOPHILIZED, FOR SOLUTION INTRAVENOUS; PARENTERAL at 04:10

## 2020-10-10 RX ADMIN — HEPARIN SODIUM 5000 UNITS: 5000 INJECTION INTRAVENOUS; SUBCUTANEOUS at 06:10

## 2020-10-10 RX ADMIN — HEPARIN SODIUM 5000 UNITS: 5000 INJECTION INTRAVENOUS; SUBCUTANEOUS at 02:10

## 2020-10-10 RX ADMIN — AMLODIPINE BESYLATE 10 MG: 10 TABLET ORAL at 08:10

## 2020-10-10 RX ADMIN — DIAZEPAM 10 MG: 5 TABLET ORAL at 08:10

## 2020-10-10 RX ADMIN — DIAZEPAM 10 MG: 5 TABLET ORAL at 02:10

## 2020-10-10 RX ADMIN — HYDROCHLOROTHIAZIDE 12.5 MG: 12.5 TABLET ORAL at 08:10

## 2020-10-10 RX ADMIN — LIDOCAINE 2 PATCH: 50 PATCH TOPICAL at 10:10

## 2020-10-10 RX ADMIN — DIAZEPAM 5 MG: 5 TABLET ORAL at 08:10

## 2020-10-10 NOTE — ASSESSMENT & PLAN NOTE
Syncope    Roshan Rankin is a 75 y.o. male with a significant medical history of HTN, ETOH abuse (10-12 beers every day) who presents to the hospital complaining of syncopal episode.  The patient was found down in his garage by his wife, unresponsive with labored respirations.  He slowly returned to baseline while in the ED. Prior to admission the patient had a witnessed episode of convulsion.  He was loaded with Keppra 1 gm.    - 2D Echo: EF 65% with normal systolic & diastolic function  -  Neurology consulted, suspect cluster of seizures (3 within 24 hr period) was provoked in the setting of hyponatremia likely due to chronic alcohol abuse  - started on Keppra 500 mg BID on admission, held per neurology recs  - MRI brain W WO contrast (epilepsy protocol):   Generalized cerebral volume loss slightly advanced for age with patchy and confluent regions of T2 FLAIR signal abnormality supratentorial white matter.   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  No evidence for acute infarction or intracranial enhancing mass lesion.  There is slight asymmetrical volume loss in the mesial temporal lobes right being slightly greater than left although no significant asymmetrical signal abnormality can not exclude right mesial temporal sclerosis.    - 24 hr EEG shows mild, generalized, non-specific cerebral dysfunction.  Assessment of activity in the right temporal lobe is significantly limited.   - per neurology, clear to discontinue continuous EEG monitoring at this time.   - No AEDs recommended at this time  - CIWA protocol as noted below  - Seizure precautions  - aspiration precautions  - neuro checks q4h

## 2020-10-10 NOTE — PROGRESS NOTES
Pharmacokinetic Assessment Follow Up: IV Vancomycin    Vancomycin serum concentration assessment(s):    The trough level was drawn correctly and can be used to guide therapy at this time. The measurement is within the desired definitive target range of 10 to 20 mcg/mL.    Vancomycin Regimen Plan:    Continue regimen to Vancomycin 1500 mg IV every 12 hours with next serum trough concentration measured at 1300 on 10/13.    Drug levels (last 3 results):  Recent Labs   Lab Result Units 10/09/20  0052 10/10/20  1207   Vancomycin-Trough ug/mL 14.1 16.4       Pharmacy will continue to follow and monitor vancomycin.    Please contact pharmacy at extension [phone] for questions regarding this assessment.    Thank you for the consult,   Mell Mcintosh       Patient brief summary:  Roshan Rankin is a 75 y.o. male initiated on antimicrobial therapy with IV Vancomycin for treatment of skin & soft tissue infection    Drug Allergies:   Review of patient's allergies indicates:  No Known Allergies    Actual Body Weight:   102.1 kg    Renal Function:   Estimated Creatinine Clearance: 95.5 mL/min (based on SCr of 0.8 mg/dL).,     Dialysis Method (if applicable):  N/A    CBC (last 72 hours):  Recent Labs   Lab Result Units 10/08/20  0254 10/09/20  0609 10/10/20  0329   WBC K/uL 13.98* 14.68* 11.28   Hemoglobin g/dL 13.7* 13.8* 13.0*   Hematocrit % 41.0 42.8 40.0   Platelets K/uL 236 204 217   Gran% % 74.6* 74.8* 70.5   Lymph% % 10.0* 9.5* 14.0*   Mono% % 14.5 14.8 14.4   Eosinophil% % 0.1 0.0 0.2   Basophil% % 0.3 0.3 0.3   Differential Method  Automated Automated Automated       Metabolic Panel (last 72 hours):  Recent Labs   Lab Result Units 10/08/20  0254 10/09/20  0609 10/10/20  0329   Sodium mmol/L 136 134* 131*   Potassium mmol/L 3.6 3.1* 3.3*   Chloride mmol/L 98 100 97   CO2 mmol/L 26 23 23   Glucose mg/dL 152* 119* 122*   BUN, Bld mg/dL 6* 7* 8   Creatinine mg/dL 0.8 0.7 0.8   Albumin g/dL 3.3* 2.8* 2.4*   Total Bilirubin mg/dL  1.7* 1.8* 1.5*   Alkaline Phosphatase U/L 77 65 70   AST U/L 36 28 29   ALT U/L 16 14 17   Magnesium mg/dL 2.0 1.5* 1.9   Phosphorus mg/dL 2.4* 2.1* 2.6*       Vancomycin Administrations:  vancomycin given in the last 96 hours                     vancomycin 1.5 g in dextrose 5 % 250 mL IVPB (ready to mix) (mg) 1,500 mg New Bag 10/10/20 0157     1,500 mg New Bag 10/09/20 1730     1,500 mg New Bag  0200     1,500 mg New Bag 10/08/20 1530     1,500 mg New Bag  0200    vancomycin 2 g in dextrose 5 % 500 mL IVPB (mg) 2,000 mg New Bag 10/07/20 1500                    Microbiologic Results:  Microbiology Results (last 7 days)       Procedure Component Value Units Date/Time    Blood culture [[id number]] Collected: 10/07/20 1110    Order Status: Completed Specimen: Blood from Antecubital, Right Hand Updated: 10/09/20 1412     Blood Culture, Routine No Growth to date      No Growth to date      No Growth to date    Blood culture [[id number]] Collected: 10/07/20 1110    Order Status: Completed Specimen: Blood from Antecubital, Right Arm Updated: 10/09/20 1412     Blood Culture, Routine No Growth to date      No Growth to date      No Growth to date

## 2020-10-10 NOTE — SUBJECTIVE & OBJECTIVE
Past Medical History:   Diagnosis Date    Alcohol abuse     Arthritis     Convulsion 10/7/2020    GERD (gastroesophageal reflux disease)     Glaucoma     Hypertension        Past Surgical History:   Procedure Laterality Date    KNEE CARTILAGE SURGERY Right        Review of patient's allergies indicates:  No Known Allergies    Medications:  Medications Prior to Admission   Medication Sig    amlodipine (NORVASC) 10 MG tablet Take 10 mg by mouth once daily.    aspirin (ECOTRIN) 81 MG EC tablet Take 81 mg by mouth once daily.    furosemide (LASIX) 40 MG tablet Take 40 mg by mouth 2 (two) times a day.    latanoprost 0.005 % ophthalmic solution 1 drop once daily.    metoprolol tartrate (LOPRESSOR) 100 MG tablet Take 100 mg by mouth 2 (two) times daily.    omeprazole (PRILOSEC) 40 MG capsule Take 1 capsule (40 mg total) by mouth once daily.     Antibiotics (From admission, onward)    Start     Stop Route Frequency Ordered    10/09/20 2045  piperacillin-tazobactam 4.5 g in sodium chloride 0.9% 100 mL IVPB (ready to mix system)      -- IV Every 8 hours (non-standard times) 10/09/20 1932    10/08/20 0200  vancomycin 1.5 g in dextrose 5 % 250 mL IVPB (ready to mix)      -- IV Every 12 hours (non-standard times) 10/07/20 1337        Antifungals (From admission, onward)    None        Antivirals (From admission, onward)    None             There is no immunization history on file for this patient.    Family History     Problem Relation (Age of Onset)    Hypertension Mother, Brother        Social History     Socioeconomic History    Marital status:      Spouse name: Not on file    Number of children: Not on file    Years of education: Not on file    Highest education level: Not on file   Occupational History    Occupation: Retired   Social Needs    Financial resource strain: Not on file    Food insecurity     Worry: Not on file     Inability: Not on file    Transportation needs     Medical: Not on file      Non-medical: Not on file   Tobacco Use    Smoking status: Never Smoker    Smokeless tobacco: Never Used   Substance and Sexual Activity    Alcohol use: Yes     Alcohol/week: 70.0 standard drinks     Types: 70 Standard drinks or equivalent per week     Comment: drinks between 8-10 beers daily    Drug use: No    Sexual activity: Yes     Partners: Female   Lifestyle    Physical activity     Days per week: Not on file     Minutes per session: Not on file    Stress: Not on file   Relationships    Social connections     Talks on phone: Not on file     Gets together: Not on file     Attends Yazdanism service: Not on file     Active member of club or organization: Not on file     Attends meetings of clubs or organizations: Not on file     Relationship status: Not on file   Other Topics Concern    Not on file   Social History Narrative    Not on file     Review of Systems   Constitutional: Negative for appetite change, chills, diaphoresis, fatigue, fever and unexpected weight change.   HENT: Negative for congestion, ear pain, sore throat and tinnitus.    Eyes: Negative for pain, redness and visual disturbance.   Respiratory: Negative for cough, shortness of breath and wheezing.    Cardiovascular: Negative for chest pain, palpitations and leg swelling.   Gastrointestinal: Negative for abdominal pain, constipation, diarrhea, rectal pain and vomiting.   Endocrine: Negative for cold intolerance and heat intolerance.   Genitourinary: Negative for dysuria, flank pain, frequency, hematuria and urgency.   Musculoskeletal: Positive for arthralgias. Negative for back pain, myalgias and neck pain.   Skin: Negative for rash.   Allergic/Immunologic: Negative for immunocompromised state.   Neurological: Negative for dizziness, light-headedness, numbness and headaches.   Hematological: Negative for adenopathy. Does not bruise/bleed easily.   Psychiatric/Behavioral: Negative for confusion and sleep disturbance. The patient is  not nervous/anxious.      Objective:     Vital Signs (Most Recent):  Temp: 98.2 °F (36.8 °C) (10/10/20 0750)  Pulse: 90 (10/10/20 0750)  Resp: (!) 30 (10/10/20 0750)  BP: (!) 142/67 (10/10/20 0750)  SpO2: 100 % (10/10/20 0750) Vital Signs (24h Range):  Temp:  [98.2 °F (36.8 °C)-99.8 °F (37.7 °C)] 98.2 °F (36.8 °C)  Pulse:  [] 90  Resp:  [19-30] 30  SpO2:  [95 %-100 %] 100 %  BP: (131-148)/(65-74) 142/67     Weight: 102.1 kg (225 lb)  Body mass index is 32.28 kg/m².    Estimated Creatinine Clearance: 95.5 mL/min (based on SCr of 0.8 mg/dL).    Physical Exam  Constitutional:       General: He is not in acute distress.     Appearance: He is well-developed. He is not diaphoretic.       HENT:      Head: Normocephalic and atraumatic.   Cardiovascular:      Rate and Rhythm: Normal rate and regular rhythm.      Heart sounds: Normal heart sounds. No murmur. No friction rub. No gallop.    Pulmonary:      Effort: Pulmonary effort is normal. No respiratory distress.      Breath sounds: Normal breath sounds. No wheezing or rales.   Abdominal:      General: Bowel sounds are normal. There is no distension.      Palpations: Abdomen is soft. There is no mass.      Tenderness: There is no abdominal tenderness. There is no guarding or rebound.   Skin:     General: Skin is warm and dry.   Neurological:      Mental Status: He is alert and oriented to person, place, and time.   Psychiatric:         Behavior: Behavior normal.         Significant Labs:   Blood Culture:   Recent Labs   Lab 10/07/20  1110   LABBLOO No Growth to date  No Growth to date  No Growth to date  No Growth to date  No Growth to date  No Growth to date     CBC:   Recent Labs   Lab 10/09/20  0609 10/10/20  0329   WBC 14.68* 11.28   HGB 13.8* 13.0*   HCT 42.8 40.0    217     CMP:   Recent Labs   Lab 10/09/20  0609 10/10/20  0329   * 131*   K 3.1* 3.3*    97   CO2 23 23   * 122*   BUN 7* 8   CREATININE 0.7 0.8   CALCIUM 8.0* 8.0*    PROT 6.7 6.6   ALBUMIN 2.8* 2.4*   BILITOT 1.8* 1.5*   ALKPHOS 65 70   AST 28 29   ALT 14 17   ANIONGAP 11 11   EGFRNONAA >60.0 >60.0     Wound Culture: No results for input(s): LABAERO in the last 4320 hours.  All pertinent labs within the past 24 hours have been reviewed.    Significant Imaging: I have reviewed all pertinent imaging results/findings within the past 24 hours.   MRI Brain Epilepsy W W/O Contrast [[id number]] (Abnormal) Resulted: 10/07/20 2132   Order Status: Completed Updated: 10/07/20 2135   Narrative:     EXAMINATION:   MRI BRAIN EPILEPSY W W/O CONTRAST     CLINICAL HISTORY:   seizures;     TECHNIQUE:   Sagittal 3D space FLAIR and sagittal 3D  T1 MP rage which were reformatted in the coronal and sagittal planes.  Axial T2, axial gradient, axial T1 and axial diffusion imaging of the whole brain without contrast.  In addition coronal thin section high-resolution T2 imaging in oblique plane was performed without contrast.  Following contrast administration axial T1 and sagittal T1 3D spoiled gradient which was reformatted in the coronal and axial planes were performed.  10 ML Gadavist intravenous contrast.     COMPARISON:   CT 10/06/2020     FINDINGS:   Generalized cerebral volume loss with compensatory enlargement ventricles sulci and cisterns without hydrocephalus.  Focal T2 FLAIR signal hyperintensity within the right thalamus corresponding to hypodensity seen on CT without diffusion abnormality or enhancement compatible with remote lacunar-type infarction.  There is slight motion distortion of the examination.  No abnormal parenchymal susceptibility to suggest parenchymal hemorrhage allowing for motion limitation.     There is no abnormal parenchymal enhancement.  There is numerous scattered patchy and confluent foci of T2 FLAIR signal hyperintensity supratentorial white matter while nonspecific concerning for moderate degree of chronic microvascular ischemic change.     Question slight  asymmetrical volume loss right mesial temporal lobe compared the left although no significant asymmetrical signal abnormality cannot exclude right mesial temporal sclerosis in light of history of seizure activity.  Clinical correlation correlation with EEG findings advised.     This report was flagged in Epic as abnormal.    Impression:       Generalized cerebral volume loss slightly advanced for age with patchy and confluent regions of T2 FLAIR signal abnormality supratentorial white matter while nonspecific concerning for moderate degree of chronic microvascular ischemic change.     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     No evidence for acute infarction or intracranial enhancing mass lesion.     There is slight asymmetrical volume loss in the mesial temporal lobes right being slightly greater than left although no significant asymmetrical signal abnormality can not exclude right mesial temporal sclerosis.  Clinical correlation and correlation with EEG advised.       Electronically signed by: Evert Priest DO   Date: 10/07/2020   Time: 21:32   X-Ray Chest 1 View [[id number]] Resulted: 10/07/20 1243   Order Status: Completed Updated: 10/07/20 1245   Narrative:     EXAMINATION:   XR CHEST 1 VIEW     CLINICAL HISTORY:   Leukocytosis;     FINDINGS:   Heart size is normal.  Lungs are clear and the bones show nothing unusual.    Impression:       No acute process seen.       Electronically signed by: Reno Gallardo MD   Date: 10/07/2020   Time: 12:43   CT Head Without Contrast [[id number]] Resulted: 10/06/20 2253   Order Status: Completed Updated: 10/06/20 2256   Narrative:     EXAMINATION:   CT HEAD WITHOUT CONTRAST     CLINICAL HISTORY:   Altered mental status;     TECHNIQUE:   Low dose axial images were obtained through the head.  Coronal and sagittal reformations were also performed. Contrast was not administered.     COMPARISON:   None.     FINDINGS:   The ventricular system, sulcal pattern  and parenchymal attenuation characteristics are consistent with chronic change.  Involutional changes noted, chronic appearing white matter change is noted.  There is no evidence for intracranial mass, mass effect or midline shift and there is no evidence for acute intracranial hemorrhage.  Appropriate CSF spaces are seen at the skull base.     The visualized orbits appear intact.  The mastoid air cells appear appropriate when accounting for averaging, mild opacity along the external auditory canal on the left may relate to cerumen.  Paranasal sinuses appear appropriate when accounting for averaging.    Impression:       Chronic intracranial changes are noted, there is no evidence for superimposed acute intracranial process.       Electronically signed by: Shai Car   Date: 10/06/2020   Time: 22:53   Imaging History    2020  Date Procedure Name Status Accession Number Location   10/07/20 08:21 PM MRI Brain Epilepsy W W/O Contrast Final [id number] [facility]   10/07/20 12:25 PM X-Ray Chest 1 View Final [id number] [facility]   10/06/20 10:37 PM CT Head Without Contrast Final [id number] [facility]   10/07/20 01:13 PM Echo Color Flow Doppler? Yes Final [id number] [facility]

## 2020-10-10 NOTE — PLAN OF CARE
Pt is AAOx3.Family at bedside. Pt is in good spirits.  Pt denies pian and/or discomfort at this time.Telly monitoring on going. NAD noted.Pt turns independently, pt is aware of bony area and pressure reduction positions Pt remains injury and fall free, non skid footwear donned, call light within reach, personal items within reach, bed in low/locked position, pt able to voice needs all needs voiced have been met at this time.

## 2020-10-10 NOTE — SUBJECTIVE & OBJECTIVE
Interval History: No acute events overnight. The patient was seen at the bedside this morning with his wife present. The patient is oriented x 3 and recalls being confused and thinking he was in [place] yesterday. He has been afebrile for 24 hours and has had no further episodes of confusion or agitation since valium increased to 10 mg. He is still complaining of bilateral knee pain. 24h EEG showed mild, generalized, non-specific cerebral dysfunction. Neurology suspect alcohol withdrawal provoked seizure, and no AEDs at this time. WBC continuing to increase so will d/c CTX and add zosyn.     Review of Systems   Constitutional: Positive for activity change. Negative for chills and fever.   Respiratory: Negative for chest tightness and shortness of breath.    Cardiovascular: Negative for chest pain and leg swelling.   Gastrointestinal: Negative for abdominal pain and nausea.   Musculoskeletal: Positive for arthralgias (chronic bilateral knee pain). Negative for joint swelling and neck stiffness.   Skin: Positive for color change and wound.   Neurological: Positive for tremors, seizures and syncope. Negative for dizziness, facial asymmetry, speech difficulty, weakness and light-headedness.   Psychiatric/Behavioral: Positive for confusion. The patient is not nervous/anxious.      Objective:     Vital Signs (Most Recent):  Temp: 98.7 °F (37.1 °C) (10/09/20 1400)  Pulse: 88 (10/09/20 1914)  Resp: (!) 22 (10/09/20 1400)  BP: (!) 148/74 (10/09/20 1400)  SpO2: 99 % (10/09/20 1914) Vital Signs (24h Range):  Temp:  [98.7 °F (37.1 °C)-99.8 °F (37.7 °C)] 98.7 °F (37.1 °C)  Pulse:  [] 88  Resp:  [20-22] 22  SpO2:  [95 %-99 %] 99 %  BP: (144-148)/(72-74) 148/74     Weight: 102.1 kg (225 lb)  Body mass index is 32.28 kg/m².    Intake/Output Summary (Last 24 hours) at 10/9/2020 2109  Last data filed at 10/9/2020 1300  Gross per 24 hour   Intake 240 ml   Output 200 ml   Net 40 ml      Physical Exam  Vitals signs and nursing note  reviewed.   Constitutional:       Appearance: He is well-developed.   Eyes:      General: No scleral icterus.     Extraocular Movements: Extraocular movements intact.   Cardiovascular:      Rate and Rhythm: Normal rate and regular rhythm.      Pulses: Normal pulses.   Pulmonary:      Effort: Pulmonary effort is normal.      Breath sounds: Normal breath sounds.   Abdominal:      General: Bowel sounds are normal.      Palpations: Abdomen is soft.      Tenderness: There is no abdominal tenderness. There is no guarding or rebound.   Musculoskeletal: Normal range of motion.         General: Swelling present. No tenderness.      Comments: Swelling and erythema noted of the left lower extremity. No warmth or tenderness noted. Improving.    Pain with ROM of knees bilaterally. No bony tenderness, deformity, or swelling noted. Chronic according to patient, but more bothersome with prolonged rest.   Skin:     General: Skin is warm and dry.      Findings: Erythema present.   Neurological:      General: No focal deficit present.      Mental Status: He is alert and oriented to person, place, and time.      Sensory: No sensory deficit.      Motor: No weakness.      Comments: Strength exam limited in lower extremity due to pain, but able to lift both legs against gravity spontaneously.    oriented x 3 and recalls being confused and thinking he was in [place] yesterday.          Significant Labs: All pertinent labs within the past 24 hours have been reviewed.    Significant Imaging: I have reviewed all pertinent imaging results/findings within the past 24 hours.

## 2020-10-10 NOTE — ASSESSMENT & PLAN NOTE
Leukocytosis  - redness, warmth and tenderness noted to LLE on admission; reported pain since scratching it at home a few days ago  - 1/4 SIRS on admission w/ WBC 14; afebrile & HDS  - started on IV vanc on 10/07  - pt spiked fever on 10/08- Tmax 101. WBC increased from 12.85 to 13.98. Added ceftriaxone for broader coverage. WBC increased further on 10/09, CTX discontinued and will start zosyn  - transition to PO on d/c

## 2020-10-10 NOTE — PROGRESS NOTES
"[facility] Medicine  Progress Note    Patient Name: Roshan Rankin  MRN: [id number]  Patient Class: IP- Inpatient   Admission Date: 10/6/2020  Length of Stay: 1 days  Attending Physician: Micky Crockett MD  Primary Care Provider: Ben Brannon MD        Subjective:     Principal Problem:Convulsion        HPI:  Roshan Rankin is a 75 y.o. male with a significant medical history of HTN, ETOH abuse (10-12 beers every day) who presents to the hospital complaining of syncopal episode.  HPI information gathered from review of the patient's medical record due to the patient being unaware of preceding events.  The patient's wife was inside the house when she heard a thud in the garage.  She went out and found the patient on his chest w/his eye closed and labored breathing.  She denies tremors or tonic clonic seizure like activity.  No bowel or urinary incontinence was reported.  The patient had blood in his mouth indicating he may have bitten his tongue.  He was not responsive to his wife's questions so she called 911.  The patient was brought to the ED for evaluation.  There was no reported history of seizures.   Shortly after arrival to the ED the patient began to return to baseline.  He did not recall falling or if he had any preceding symptoms.  The patient denied CP, SOB, HA, change in vision, dizziness, or N/V.  ED work up included labs that revealed a WBC 14.71, Na 131.  Other labs grossly normal.  Currently the patient is AA&O x3.  He is c/o right sided chest pain that he describes as "like I pulled a muscle".  He has no other complaints at this time.  Per his nurse, the patient's speed to respond has improved since his arrival in his room.  The patient is admitted to Hospital Medicine.    Overview/Hospital Course:  Mr. Rankin was admitted to hospital medicine service for evaluation of possible syncopal episodes with convulsions. In the ED, he was witnessed to have a generalized " tonic clonic lasting 10-15 seconds with tongue biting. Was given total of 2 g IV Keppra then started on 500 mg BID. CT head WO contrast unremarkable for acute intracranial pathology. Labs with leukocytosis (14) and hyponatremia (131). Patient with notable LLE cellulitis on exam, started on IV vanc. Suspect cellulitis etiology of leukocytosis. WBC increased on 10/8 and pt spiked fever of 101. Added Ceftriaxone for broader coverage and increased valium for possible etiology of fevers being alcohol withdrawal. No previous hx of seizures or similar episodes. Pt and wife did say he drank less alcohol yon day of admission (~4 beers all day), but denies recent cut back in alcohol consumption. Neuro consulted, suspect cluster of seizures (3 within 24 hr period) was provoked in the setting of hyponatremia likely due to chronic alcohol abuse. MRI w w/o contrast showed generalized volume loss and moderate degree of chronic microvascular ischemic disease. Small R thalamic lacunar infarct. Slight asymmetrical volume loss in the mesial temporal lobes R slightly greater than L. No acute intracranial pathology. 24 hr vEEG showed mild, generalized, non-specific cerebral dysfunction. Neurology suspect alcohol withdrawal provoked seizure, and no AEDs at this time. Will continue IV antibiotic therapy, alcohol withdrawal treatment, and seizure precautions.    10/9: WBC continuing to increase despite addition of CTX. Pt afebrile. Discontinue CTX and add zosyn.     Interval History: No acute events overnight. The patient was seen at the bedside this morning with his wife present. The patient is oriented x 3 and recalls being confused and thinking he was in [place] yesterday. He has been afebrile for 24 hours and has had no further episodes of confusion or agitation since valium increased to 10 mg. He is still complaining of bilateral knee pain. 24h EEG showed mild, generalized, non-specific cerebral dysfunction. Neurology suspect alcohol  withdrawal provoked seizure, and no AEDs at this time. WBC continuing to increase so will d/c CTX and add zosyn.     Review of Systems   Constitutional: Positive for activity change. Negative for chills and fever.   Respiratory: Negative for chest tightness and shortness of breath.    Cardiovascular: Negative for chest pain and leg swelling.   Gastrointestinal: Negative for abdominal pain and nausea.   Musculoskeletal: Positive for arthralgias (chronic bilateral knee pain). Negative for joint swelling and neck stiffness.   Skin: Positive for color change and wound.   Neurological: Positive for tremors, seizures and syncope. Negative for dizziness, facial asymmetry, speech difficulty, weakness and light-headedness.   Psychiatric/Behavioral: Positive for confusion. The patient is not nervous/anxious.      Objective:     Vital Signs (Most Recent):  Temp: 98.7 °F (37.1 °C) (10/09/20 1400)  Pulse: 88 (10/09/20 1914)  Resp: (!) 22 (10/09/20 1400)  BP: (!) 148/74 (10/09/20 1400)  SpO2: 99 % (10/09/20 1914) Vital Signs (24h Range):  Temp:  [98.7 °F (37.1 °C)-99.8 °F (37.7 °C)] 98.7 °F (37.1 °C)  Pulse:  [] 88  Resp:  [20-22] 22  SpO2:  [95 %-99 %] 99 %  BP: (144-148)/(72-74) 148/74     Weight: 102.1 kg (225 lb)  Body mass index is 32.28 kg/m².    Intake/Output Summary (Last 24 hours) at 10/9/2020 2109  Last data filed at 10/9/2020 1300  Gross per 24 hour   Intake 240 ml   Output 200 ml   Net 40 ml      Physical Exam  Vitals signs and nursing note reviewed.   Constitutional:       Appearance: He is well-developed.   Eyes:      General: No scleral icterus.     Extraocular Movements: Extraocular movements intact.   Cardiovascular:      Rate and Rhythm: Normal rate and regular rhythm.      Pulses: Normal pulses.   Pulmonary:      Effort: Pulmonary effort is normal.      Breath sounds: Normal breath sounds.   Abdominal:      General: Bowel sounds are normal.      Palpations: Abdomen is soft.      Tenderness: There is no  abdominal tenderness. There is no guarding or rebound.   Musculoskeletal: Normal range of motion.         General: Swelling present. No tenderness.      Comments: Swelling and erythema noted of the left lower extremity. No warmth or tenderness noted. Improving.    Pain with ROM of knees bilaterally. No bony tenderness, deformity, or swelling noted. Chronic according to patient, but more bothersome with prolonged rest.   Skin:     General: Skin is warm and dry.      Findings: Erythema present.   Neurological:      General: No focal deficit present.      Mental Status: He is alert and oriented to person, place, and time.      Sensory: No sensory deficit.      Motor: No weakness.      Comments: Strength exam limited in lower extremity due to pain, but able to lift both legs against gravity spontaneously.    oriented x 3 and recalls being confused and thinking he was in [place] yesterday.          Significant Labs: All pertinent labs within the past 24 hours have been reviewed.    Significant Imaging: I have reviewed all pertinent imaging results/findings within the past 24 hours.      Assessment/Plan:      * Convulsion  Syncope    Roshan Rankin is a 75 y.o. male with a significant medical history of HTN, ETOH abuse (10-12 beers every day) who presents to the hospital complaining of syncopal episode.  The patient was found down in his garage by his wife, unresponsive with labored respirations.  He slowly returned to baseline while in the ED. Prior to admission the patient had a witnessed episode of convulsion.  He was loaded with Keppra 1 gm.    - 2D Echo: EF 65% with normal systolic & diastolic function  -  Neurology consulted, suspect cluster of seizures (3 within 24 hr period) was provoked in the setting of hyponatremia likely due to chronic alcohol abuse  - started on Keppra 500 mg BID on admission, held per neurology recs  - MRI brain W WO contrast (epilepsy protocol):   Generalized cerebral volume loss slightly  "advanced for age with patchy and confluent regions of T2 FLAIR signal abnormality supratentorial white matter.   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  No evidence for acute infarction or intracranial enhancing mass lesion.  There is slight asymmetrical volume loss in the mesial temporal lobes right being slightly greater than left although no significant asymmetrical signal abnormality can not exclude right mesial temporal sclerosis.    - 24 hr EEG shows mild, generalized, non-specific cerebral dysfunction.  Assessment of activity in the right temporal lobe is significantly limited.   - per neurology, clear to discontinue continuous EEG monitoring at this time.   - No AEDs recommended at this time  - CIWA protocol as noted below  - Seizure precautions  - aspiration precautions  - neuro checks q4h    Cellulitis of left lower extremity  Leukocytosis  - redness, warmth and tenderness noted to LLE on admission; reported pain since scratching it at home a few days ago  - 1/4 SIRS on admission w/ WBC 14; afebrile & HDS  - started on IV vanc on 10/07  - pt spiked fever on 10/08- Tmax 101. WBC increased from 12.85 to 13.98. Added ceftriaxone for broader coverage. WBC increased further on 10/09, CTX discontinued and will start zosyn  - transition to PO on d/c    Other chest pain  -Patient c/o right sided chest pain that he describes as "like I pulled a muscle".  Not reproducible on exam.  -Troponin <0.006  - BNP 97  - EKG: no acute changes  - resolved    Hyponatremia  -Na 131 on admission.  - cc/hr  - improved to 133, continue to monitor with daily labs    Alcohol abuse  -Patient known to drink 10-12 beers/day.  Last ETOH just prior to syncopal episode, 4 cans on day of admission  -Monitor for s/s withdrawal  -CIWA Q8h  -Primary team to be notified if CIWA>10  -Thiamine, Folate, MVI daily  - PETH pending  - started on diazepam 5mg TID for withdrawal ppx, increased to 10 mg tid on 10/8 in setting " of fever and increased confusion/agitation. titrate as needed    Essential hypertension  -Continue home Norvasc, HCTZ, metoprolol, and lasix  -Continuing home meds may compensate for s/s of ETOH withdrawals.  Other s/s such as tremors, diaphoresis, hallucinations, etc may be more reliable indicators.    Knee pain, bilateral  - pt complaining of bilateral knee pain. He reports that the pain is chronic and is an 8/10 at home on a normal basis. Pain has increased during hospitalization due to decreased use  - Pt reports history of arthritis bilaterally. He has had 2 surgeries on his L knee years ago. He walks with a limp at baseline due to chronic knee pain.    - Physical exam remarkable for pain with ROM bilaterally. No bony tenderness to palpation, no joint swelling or erythema noted, 5/5 strength bilaterally- although assessment limited 2/2 pain  - PT/OT consulted  - X-ray bilateral knees pending.   - lidocaine patch prn    Syncope  Convulsion  Rohsan Rankin is a 75 y.o. male with a significant medical history of HTN, ETOH abuse (10-12 beers every day) who presents to the hospital complaining of syncopal episode.  The patient was found down in his garage by his wife, unresponsive with labored respirations.  He slowly returned to baseline while in the ED. Prior to admission the patient had a witnessed episode of convulsion.  He was loaded with Keppra 1 gm.  -2D Echo  -Telemetry  -EEG  -Seizure precautions  -Keppra 500 mg BID  -Consult General Neurology, appreciate the consult and recs          VTE Risk Mitigation (From admission, onward)         Ordered     heparin (porcine) injection 5,000 Units  Every 8 hours      10/07/20 0109     IP VTE HIGH RISK PATIENT  Once      10/07/20 0109     Place sequential compression device  Until discontinued      10/07/20 0109                Discharge Planning   SHANE: 10/11/2020     Code Status: Full Code   Is the patient medically ready for discharge?: No    Reason for patient still  in hospital (select all that apply): Patient trending condition, Laboratory test and Treatment  Discharge Plan A: Home, Home with family   Discharge Delays: None known at this time            Discussed with staff, Keira Velasco PA-C  Department of Hospital Medicine   [facility]

## 2020-10-10 NOTE — ASSESSMENT & PLAN NOTE
"-Patient c/o right sided chest pain that he describes as "like I pulled a muscle".  Not reproducible on exam.  -Troponin <0.006  - BNP 97  - EKG: no acute changes  - resolved  "

## 2020-10-10 NOTE — HPI
75 y.o. male with HTN and alcohol abuse (10-12 beers daily) presented to ED 10/6/20 after 2 LOC episodes at home. Wife reports first episode occurred around 10 am. Pt was found lying on his side on the floor in the garage making abnormally loud respirations. Pt thought he had fainted. [place] tired afterwards, ate lunch and rested for a few hours. Later that evening, wife was inside and heard a thud in the garage. Once again he was found lying on the ground with abnormally loud respirations and this time blood coming out of his mouth. Wife called EMS and in the ED, he was witnessed to have a 3rd episode of tonic-clonic movements and tongue biting. He was given 1 g of Keppra x 2 then started on 500 mg BID. Labs remarkable for leukocytosis (14.71>>12.85) and hyponatremia (131). CT head WO contrast unremarkable for acute intracranial pathology. Placed on CIWA protocol and started on folic acid and thiamine given alcohol intake history. Wife says he had an abnormally small amount of alcohol yesterday, probably ~4 beers, but otherwise has not cut back on drinking lately. Denies any previous hx of seizures, family hx of epilepsy, CNS infections. Remote head trauma from football. Denied taking medications that lower the seizure threshold.     Patient admitted and blood cultures drawn and are NGTD.  Had isolated fever to 101 on 1/8 and none since.  WBC has been elevated to 14s now WNL.  Patient treated with Vanc then Vanc/CTX and now vanc and zosyn given WBC wbc and concern for LE cellulitis.  The patient denies any recent fever, chills, or sweats.  ID consulted for evaluation and ABX recs.

## 2020-10-10 NOTE — CONSULTS
[facility]  Infectious Disease  Consult Note    Patient Name: Roshan Rankin  MRN: [id number]  Admission Date: 10/6/2020  Hospital Length of Stay: 2 days  Attending Physician: Micyk Crockett MD  Primary Care Provider: Ben Brannon MD     Inpatient consult to Infectious Diseases  Consult performed by: CARLOS Bardales Jr.  Consult ordered by: Anuja Velasco PA-C      Consult received.  Full consult to follow.      CARLOS Mack  Infectious Disease  [facility]

## 2020-10-10 NOTE — CONSULTS
[facility] - ICU 14 WT  Infectious Disease  Consult Note    Patient Name: Roshan Rankin  MRN: [id number]  Admission Date: 10/6/2020  Hospital Length of Stay: 2 days  Attending Physician: Micky Crockett MD  Primary Care Provider: Ben Brannon MD     Isolation Status: No active isolations    Patient information was obtained from patient and ER records.      Consults  Assessment/Plan:     Cellulitis of left lower extremity  76 yo male admitted with Sz likely secondary to hyponatremia and ETOH w/drawl found to have cellulitis LLE and leukocytosis.  On exam there is concern for R knee infection.  - on vanc and zosyn  - leukocytosis resolved - poss secondary to seizure/cellulitis  - stable non septic    Plan:  1. Continue Vanc  2. DC zosyn and start ceftriaxone  3. Rec Ortho Sx consult to evaluate R knee  4. Will follow        Thank you for your consult. I will follow-up with patient. Please contact us if you have any additional questions.    CARLOS Mack  Infectious Disease  [facility] - ICU 14 WT    Subjective:     Principal Problem: Convulsion    HPI: 75 y.o. male with HTN and alcohol abuse (10-12 beers daily) presented to ED 10/6/20 after 2 LOC episodes at home. Wife reports first episode occurred around 10 am. Pt was found lying on his side on the floor in the garage making abnormally loud respirations. Pt thought he had fainted. Arpin tired afterwards, ate lunch and rested for a few hours. Later that evening, wife was inside and heard a thud in the garage. Once again he was found lying on the ground with abnormally loud respirations and this time blood coming out of his mouth. Wife called EMS and in the ED, he was witnessed to have a 3rd episode of tonic-clonic movements and tongue biting. He was given 1 g of Keppra x 2 then started on 500 mg BID. Labs remarkable for leukocytosis (14.71>>12.85) and hyponatremia (131). CT head WO contrast unremarkable for acute intracranial  pathology. Placed on CIWA protocol and started on folic acid and thiamine given alcohol intake history. Wife says he had an abnormally small amount of alcohol yesterday, probably ~4 beers, but otherwise has not cut back on drinking lately. Denies any previous hx of seizures, family hx of epilepsy, CNS infections. Remote head trauma from football. Denied taking medications that lower the seizure threshold.     Patient admitted and blood cultures drawn and are NGTD.  Had isolated fever to 101 on 1/8 and none since.  WBC has been elevated to 14s now WNL.  Patient treated with Vanc then Vanc/CTX and now vanc and zosyn given WBC wbc and concern for LE cellulitis.  The patient denies any recent fever, chills, or sweats.  ID consulted for evaluation and ABX recs.       Past Medical History:   Diagnosis Date    Alcohol abuse     Arthritis     Convulsion 10/7/2020    GERD (gastroesophageal reflux disease)     Glaucoma     Hypertension        Past Surgical History:   Procedure Laterality Date    KNEE CARTILAGE SURGERY Right        Review of patient's allergies indicates:  No Known Allergies    Medications:  Medications Prior to Admission   Medication Sig    amlodipine (NORVASC) 10 MG tablet Take 10 mg by mouth once daily.    aspirin (ECOTRIN) 81 MG EC tablet Take 81 mg by mouth once daily.    furosemide (LASIX) 40 MG tablet Take 40 mg by mouth 2 (two) times a day.    latanoprost 0.005 % ophthalmic solution 1 drop once daily.    metoprolol tartrate (LOPRESSOR) 100 MG tablet Take 100 mg by mouth 2 (two) times daily.    omeprazole (PRILOSEC) 40 MG capsule Take 1 capsule (40 mg total) by mouth once daily.     Antibiotics (From admission, onward)    Start     Stop Route Frequency Ordered    10/09/20 2045  piperacillin-tazobactam 4.5 g in sodium chloride 0.9% 100 mL IVPB (ready to mix system)      -- IV Every 8 hours (non-standard times) 10/09/20 1932    10/08/20 0200  vancomycin 1.5 g in dextrose 5 % 250 mL IVPB  (ready to mix)      -- IV Every 12 hours (non-standard times) 10/07/20 1337        Antifungals (From admission, onward)    None        Antivirals (From admission, onward)    None             There is no immunization history on file for this patient.    Family History     Problem Relation (Age of Onset)    Hypertension Mother, Brother        Social History     Socioeconomic History    Marital status:      Spouse name: Not on file    Number of children: Not on file    Years of education: Not on file    Highest education level: Not on file   Occupational History    Occupation: Retired   Social Needs    Financial resource strain: Not on file    Food insecurity     Worry: Not on file     Inability: Not on file    Transportation needs     Medical: Not on file     Non-medical: Not on file   Tobacco Use    Smoking status: Never Smoker    Smokeless tobacco: Never Used   Substance and Sexual Activity    Alcohol use: Yes     Alcohol/week: 70.0 standard drinks     Types: 70 Standard drinks or equivalent per week     Comment: drinks between 8-10 beers daily    Drug use: No    Sexual activity: Yes     Partners: Female   Lifestyle    Physical activity     Days per week: Not on file     Minutes per session: Not on file    Stress: Not on file   Relationships    Social connections     Talks on phone: Not on file     Gets together: Not on file     Attends Scientologist service: Not on file     Active member of club or organization: Not on file     Attends meetings of clubs or organizations: Not on file     Relationship status: Not on file   Other Topics Concern    Not on file   Social History Narrative    Not on file     Review of Systems   Constitutional: Negative for appetite change, chills, diaphoresis, fatigue, fever and unexpected weight change.   HENT: Negative for congestion, ear pain, sore throat and tinnitus.    Eyes: Negative for pain, redness and visual disturbance.   Respiratory: Negative for cough,  shortness of breath and wheezing.    Cardiovascular: Negative for chest pain, palpitations and leg swelling.   Gastrointestinal: Negative for abdominal pain, constipation, diarrhea, rectal pain and vomiting.   Endocrine: Negative for cold intolerance and heat intolerance.   Genitourinary: Negative for dysuria, flank pain, frequency, hematuria and urgency.   Musculoskeletal: Positive for arthralgias. Negative for back pain, myalgias and neck pain.   Skin: Negative for rash.   Allergic/Immunologic: Negative for immunocompromised state.   Neurological: Negative for dizziness, light-headedness, numbness and headaches.   Hematological: Negative for adenopathy. Does not bruise/bleed easily.   Psychiatric/Behavioral: Negative for confusion and sleep disturbance. The patient is not nervous/anxious.      Objective:     Vital Signs (Most Recent):  Temp: 98.2 °F (36.8 °C) (10/10/20 0750)  Pulse: 90 (10/10/20 0750)  Resp: (!) 30 (10/10/20 0750)  BP: (!) 142/67 (10/10/20 0750)  SpO2: 100 % (10/10/20 0750) Vital Signs (24h Range):  Temp:  [98.2 °F (36.8 °C)-99.8 °F (37.7 °C)] 98.2 °F (36.8 °C)  Pulse:  [] 90  Resp:  [19-30] 30  SpO2:  [95 %-100 %] 100 %  BP: (131-148)/(65-74) 142/67     Weight: 102.1 kg (225 lb)  Body mass index is 32.28 kg/m².    Estimated Creatinine Clearance: 95.5 mL/min (based on SCr of 0.8 mg/dL).    Physical Exam  Constitutional:       General: He is not in acute distress.     Appearance: He is well-developed. He is not diaphoretic.       HENT:      Head: Normocephalic and atraumatic.   Cardiovascular:      Rate and Rhythm: Normal rate and regular rhythm.      Heart sounds: Normal heart sounds. No murmur. No friction rub. No gallop.    Pulmonary:      Effort: Pulmonary effort is normal. No respiratory distress.      Breath sounds: Normal breath sounds. No wheezing or rales.   Abdominal:      General: Bowel sounds are normal. There is no distension.      Palpations: Abdomen is soft. There is no mass.       Tenderness: There is no abdominal tenderness. There is no guarding or rebound.   Skin:     General: Skin is warm and dry.   Neurological:      Mental Status: He is alert and oriented to person, place, and time.   Psychiatric:         Behavior: Behavior normal.         Significant Labs:   Blood Culture:   Recent Labs   Lab 10/07/20  1110   LABBLOO No Growth to date  No Growth to date  No Growth to date  No Growth to date  No Growth to date  No Growth to date     CBC:   Recent Labs   Lab 10/09/20  0609 10/10/20  0329   WBC 14.68* 11.28   HGB 13.8* 13.0*   HCT 42.8 40.0    217     CMP:   Recent Labs   Lab 10/09/20  0609 10/10/20  0329   * 131*   K 3.1* 3.3*    97   CO2 23 23   * 122*   BUN 7* 8   CREATININE 0.7 0.8   CALCIUM 8.0* 8.0*   PROT 6.7 6.6   ALBUMIN 2.8* 2.4*   BILITOT 1.8* 1.5*   ALKPHOS 65 70   AST 28 29   ALT 14 17   ANIONGAP 11 11   EGFRNONAA >60.0 >60.0     Wound Culture: No results for input(s): LABAERO in the last 4320 hours.  All pertinent labs within the past 24 hours have been reviewed.    Significant Imaging: I have reviewed all pertinent imaging results/findings within the past 24 hours.   MRI Brain Epilepsy W W/O Contrast [[id number]] (Abnormal) Resulted: 10/07/20 2132   Order Status: Completed Updated: 10/07/20 2135   Narrative:     EXAMINATION:   MRI BRAIN EPILEPSY W W/O CONTRAST     CLINICAL HISTORY:   seizures;     TECHNIQUE:   Sagittal 3D space FLAIR and sagittal 3D  T1 MP rage which were reformatted in the coronal and sagittal planes.  Axial T2, axial gradient, axial T1 and axial diffusion imaging of the whole brain without contrast.  In addition coronal thin section high-resolution T2 imaging in oblique plane was performed without contrast.  Following contrast administration axial T1 and sagittal T1 3D spoiled gradient which was reformatted in the coronal and axial planes were performed.  10 ML Gadavist intravenous contrast.     COMPARISON:   CT  10/06/2020     FINDINGS:   Generalized cerebral volume loss with compensatory enlargement ventricles sulci and cisterns without hydrocephalus.  Focal T2 FLAIR signal hyperintensity within the right thalamus corresponding to hypodensity seen on CT without diffusion abnormality or enhancement compatible with remote lacunar-type infarction.  There is slight motion distortion of the examination.  No abnormal parenchymal susceptibility to suggest parenchymal hemorrhage allowing for motion limitation.     There is no abnormal parenchymal enhancement.  There is numerous scattered patchy and confluent foci of T2 FLAIR signal hyperintensity supratentorial white matter while nonspecific concerning for moderate degree of chronic microvascular ischemic change.     Question slight asymmetrical volume loss right mesial temporal lobe compared the left although no significant asymmetrical signal abnormality cannot exclude right mesial temporal sclerosis in light of history of seizure activity.  Clinical correlation correlation with EEG findings advised.     This report was flagged in Epic as abnormal.    Impression:       Generalized cerebral volume loss slightly advanced for age with patchy and confluent regions of T2 FLAIR signal abnormality supratentorial white matter while nonspecific concerning for moderate degree of chronic microvascular ischemic change.     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     No evidence for acute infarction or intracranial enhancing mass lesion.     There is slight asymmetrical volume loss in the mesial temporal lobes right being slightly greater than left although no significant asymmetrical signal abnormality can not exclude right mesial temporal sclerosis.  Clinical correlation and correlation with EEG advised.       Electronically signed by: Evert Priest DO   Date: 10/07/2020   Time: 21:32   X-Ray Chest 1 View [[id number]] Resulted: 10/07/20 1243   Order Status: Completed  Updated: 10/07/20 1245   Narrative:     EXAMINATION:   XR CHEST 1 VIEW     CLINICAL HISTORY:   Leukocytosis;     FINDINGS:   Heart size is normal.  Lungs are clear and the bones show nothing unusual.    Impression:       No acute process seen.       Electronically signed by: Reno Gallardo MD   Date: 10/07/2020   Time: 12:43   CT Head Without Contrast [[id number]] Resulted: 10/06/20 2253   Order Status: Completed Updated: 10/06/20 2256   Narrative:     EXAMINATION:   CT HEAD WITHOUT CONTRAST     CLINICAL HISTORY:   Altered mental status;     TECHNIQUE:   Low dose axial images were obtained through the head.  Coronal and sagittal reformations were also performed. Contrast was not administered.     COMPARISON:   None.     FINDINGS:   The ventricular system, sulcal pattern and parenchymal attenuation characteristics are consistent with chronic change.  Involutional changes noted, chronic appearing white matter change is noted.  There is no evidence for intracranial mass, mass effect or midline shift and there is no evidence for acute intracranial hemorrhage.  Appropriate CSF spaces are seen at the skull base.     The visualized orbits appear intact.  The mastoid air cells appear appropriate when accounting for averaging, mild opacity along the external auditory canal on the left may relate to cerumen.  Paranasal sinuses appear appropriate when accounting for averaging.    Impression:       Chronic intracranial changes are noted, there is no evidence for superimposed acute intracranial process.       Electronically signed by: Shai Car   Date: 10/06/2020   Time: 22:53   Imaging History    2020  Date Procedure Name Status Accession Number Location   10/07/20 08:21 PM MRI Brain Epilepsy W W/O Contrast Final [id number] [facility]   10/07/20 12:25 PM X-Ray Chest 1 View Final [id number] [facility]   10/06/20 10:37 PM CT Head Without Contrast Final [id number] [facility]   10/07/20 01:13 PM Echo Color Flow Doppler? Yes Final [id number] [facility]

## 2020-10-10 NOTE — SUBJECTIVE & OBJECTIVE
Interval History: No acute events overnight. Patient seen at bedside this am. He has had no further episodes of confusion, agitation, or other withdrawal symptoms. Left hand resting tremor noted on physical exam today, pt states that it has been present for years. Reports bilateral knee pain with no improvement. X-ray pending. ID consulted for evaluation and antibiotic recs- recommend discontinuing zosyn and treatment with vanc + CTX.     Review of Systems   Constitutional: Positive for activity change. Negative for chills and fever.   Respiratory: Negative for chest tightness and shortness of breath.    Cardiovascular: Negative for chest pain and leg swelling.   Gastrointestinal: Negative for abdominal pain and nausea.   Musculoskeletal: Positive for arthralgias (chronic bilateral knee pain). Negative for joint swelling and neck stiffness.   Skin: Positive for color change and wound.   Neurological: Positive for tremors. Negative for dizziness, seizures, syncope, facial asymmetry, speech difficulty, weakness and light-headedness.   Psychiatric/Behavioral: Negative for confusion. The patient is not nervous/anxious.      Objective:     Vital Signs (Most Recent):  Temp: 99.7 °F (37.6 °C) (10/10/20 1500)  Pulse: 86 (10/10/20 1635)  Resp: 20 (10/10/20 1500)  BP: (!) 140/69 (10/10/20 1500)  SpO2: 96 % (10/10/20 1500) Vital Signs (24h Range):  Temp:  [98.2 °F (36.8 °C)-99.7 °F (37.6 °C)] 99.7 °F (37.6 °C)  Pulse:  [71-90] 86  Resp:  [19-30] 20  SpO2:  [96 %-100 %] 96 %  BP: (131-148)/(65-69) 140/69     Weight: 102.1 kg (225 lb)  Body mass index is 32.28 kg/m².  No intake or output data in the 24 hours ending 10/10/20 1854   Physical Exam  Vitals signs and nursing note reviewed.   Constitutional:       Appearance: He is well-developed.   Eyes:      General: No scleral icterus.     Extraocular Movements: Extraocular movements intact.   Cardiovascular:      Rate and Rhythm: Normal rate and regular rhythm.      Pulses: Normal  pulses.   Pulmonary:      Effort: Pulmonary effort is normal.      Breath sounds: Normal breath sounds.   Abdominal:      General: Bowel sounds are normal.      Palpations: Abdomen is soft.      Tenderness: There is no abdominal tenderness. There is no guarding or rebound.   Musculoskeletal: Normal range of motion.         General: Swelling present. No tenderness.      Comments: Swelling and erythema noted of the left lower extremity. No warmth or tenderness noted. Improving.    Pain with ROM of knees bilaterally. No bony tenderness, deformity, or swelling noted. Chronic according to patient, but more bothersome with prolonged rest.   Skin:     General: Skin is warm and dry.      Findings: Erythema present.   Neurological:      General: No focal deficit present.      Mental Status: He is alert and oriented to person, place, and time.      Sensory: No sensory deficit.      Motor: Tremor (L hand at rest) present. No weakness.      Comments: Strength exam limited in lower extremity due to pain, but able to lift both legs against gravity spontaneously.    oriented x 3         Significant Labs: All pertinent labs within the past 24 hours have been reviewed.    Significant Imaging: I have reviewed all pertinent imaging results/findings within the past 24 hours.

## 2020-10-10 NOTE — ASSESSMENT & PLAN NOTE
74 yo male admitted with Sz likely secondary to hyponatremia and ETOH w/drawl found to have cellulitis LLE and leukocytosis.  On exam there is concern for R knee infection.  - on vanc and zosyn  - leukocytosis resolved - poss secondary to seizure/cellulitis  - stable non septic    Plan:  1. Continue Vanc  2. DC zosyn and start ceftriaxone  3. Rec Ortho Sx consult to evaluate R knee  4. Will follow

## 2020-10-10 NOTE — ASSESSMENT & PLAN NOTE
- pt complaining of bilateral knee pain. He reports that the pain is chronic and is an 8/10 at home on a normal basis. Pain has increased during hospitalization due to decreased use  - Pt reports history of arthritis bilaterally. He has had 2 surgeries on his L knee years ago. He walks with a limp at baseline due to chronic knee pain.    - Physical exam remarkable for pain with ROM bilaterally. No bony tenderness to palpation, no joint swelling or erythema noted, 5/5 strength bilaterally- although assessment limited 2/2 pain  - PT/OT consulted  - X-ray bilateral knees pending.   - lidocaine patch prn

## 2020-10-11 PROBLEM — M11.89 PSEUDOGOUT INVOLVING MULTIPLE JOINTS: Status: ACTIVE | Noted: 2020-10-11

## 2020-10-11 PROBLEM — M79.604 BILATERAL PAIN OF LEG AND FOOT: Status: ACTIVE | Noted: 2020-10-08

## 2020-10-11 PROBLEM — M79.672 BILATERAL PAIN OF LEG AND FOOT: Status: ACTIVE | Noted: 2020-10-08

## 2020-10-11 PROBLEM — M79.671 BILATERAL PAIN OF LEG AND FOOT: Status: ACTIVE | Noted: 2020-10-08

## 2020-10-11 PROBLEM — M79.605 BILATERAL PAIN OF LEG AND FOOT: Status: ACTIVE | Noted: 2020-10-08

## 2020-10-11 LAB
ALBUMIN SERPL BCP-MCNC: 2.3 G/DL (ref 3.5–5.2)
ALP SERPL-CCNC: 68 U/L (ref 55–135)
ALT SERPL W/O P-5'-P-CCNC: 32 U/L (ref 10–44)
ANION GAP SERPL CALC-SCNC: 12 MMOL/L (ref 8–16)
APPEARANCE FLD: NORMAL
APPEARANCE FLD: NORMAL
AST SERPL-CCNC: 45 U/L (ref 10–40)
BASOPHILS # BLD AUTO: 0.03 K/UL (ref 0–0.2)
BASOPHILS NFR BLD: 0.3 % (ref 0–1.9)
BILIRUB SERPL-MCNC: 1.2 MG/DL (ref 0.1–1)
BILIRUB UR QL STRIP: NEGATIVE
BODY FLD TYPE: ABNORMAL
BODY FLD TYPE: ABNORMAL
BODY FLD TYPE: NORMAL
BODY FLD TYPE: NORMAL
BUN SERPL-MCNC: 11 MG/DL (ref 8–23)
CALCIUM SERPL-MCNC: 7.9 MG/DL (ref 8.7–10.5)
CHLORIDE SERPL-SCNC: 97 MMOL/L (ref 95–110)
CLARITY UR REFRACT.AUTO: CLEAR
CO2 SERPL-SCNC: 23 MMOL/L (ref 23–29)
COLOR FLD: YELLOW
COLOR FLD: YELLOW
COLOR UR AUTO: YELLOW
CREAT SERPL-MCNC: 0.8 MG/DL (ref 0.5–1.4)
CRP SERPL-MCNC: 215.9 MG/L (ref 0–8.2)
CRYSTALS FLD MICRO: POSITIVE
CRYSTALS FLD MICRO: POSITIVE
DIFFERENTIAL METHOD: ABNORMAL
EOSINOPHIL # BLD AUTO: 0 K/UL (ref 0–0.5)
EOSINOPHIL NFR BLD: 0.4 % (ref 0–8)
ERYTHROCYTE [DISTWIDTH] IN BLOOD BY AUTOMATED COUNT: 12.2 % (ref 11.5–14.5)
ERYTHROCYTE [SEDIMENTATION RATE] IN BLOOD BY WESTERGREN METHOD: 79 MM/HR (ref 0–23)
EST. GFR  (AFRICAN AMERICAN): >60 ML/MIN/1.73 M^2
EST. GFR  (NON AFRICAN AMERICAN): >60 ML/MIN/1.73 M^2
GLUCOSE SERPL-MCNC: 124 MG/DL (ref 70–110)
GLUCOSE UR QL STRIP: NEGATIVE
GRAM STN SPEC: NORMAL
HCT VFR BLD AUTO: 37.7 % (ref 40–54)
HGB BLD-MCNC: 12.8 G/DL (ref 14–18)
HGB UR QL STRIP: ABNORMAL
IMM GRANULOCYTES # BLD AUTO: 0.02 K/UL (ref 0–0.04)
IMM GRANULOCYTES NFR BLD AUTO: 0.2 % (ref 0–0.5)
KETONES UR QL STRIP: NEGATIVE
LEUKOCYTE ESTERASE UR QL STRIP: NEGATIVE
LYMPHOCYTES # BLD AUTO: 1.4 K/UL (ref 1–4.8)
LYMPHOCYTES NFR BLD: 15.8 % (ref 18–48)
LYMPHOCYTES NFR FLD MANUAL: 1 %
LYMPHOCYTES NFR FLD MANUAL: 1 %
MAGNESIUM SERPL-MCNC: 1.9 MG/DL (ref 1.6–2.6)
MCH RBC QN AUTO: 32.3 PG (ref 27–31)
MCHC RBC AUTO-ENTMCNC: 34 G/DL (ref 32–36)
MCV RBC AUTO: 95 FL (ref 82–98)
MICROSCOPIC COMMENT: NORMAL
MONOCYTES # BLD AUTO: 1.6 K/UL (ref 0.3–1)
MONOCYTES NFR BLD: 17.3 % (ref 4–15)
MONOS+MACROS NFR FLD MANUAL: 11 %
MONOS+MACROS NFR FLD MANUAL: 12 %
NEUTROPHILS # BLD AUTO: 6 K/UL (ref 1.8–7.7)
NEUTROPHILS NFR BLD: 66 % (ref 38–73)
NEUTROPHILS NFR FLD MANUAL: 87 %
NEUTROPHILS NFR FLD MANUAL: 88 %
NITRITE UR QL STRIP: NEGATIVE
NRBC BLD-RTO: 0 /100 WBC
PH UR STRIP: 7 [PH] (ref 5–8)
PHOSPHATE SERPL-MCNC: 3.7 MG/DL (ref 2.7–4.5)
PLATELET # BLD AUTO: 242 K/UL (ref 150–350)
PMV BLD AUTO: 11.1 FL (ref 9.2–12.9)
POTASSIUM SERPL-SCNC: 3.2 MMOL/L (ref 3.5–5.1)
PROT SERPL-MCNC: 6.4 G/DL (ref 6–8.4)
PROT UR QL STRIP: NEGATIVE
RBC # BLD AUTO: 3.96 M/UL (ref 4.6–6.2)
RBC #/AREA URNS AUTO: 2 /HPF (ref 0–4)
SODIUM SERPL-SCNC: 132 MMOL/L (ref 136–145)
SP GR UR STRIP: 1 (ref 1–1.03)
URATE SERPL-MCNC: 4.1 MG/DL (ref 3.4–7)
URN SPEC COLLECT METH UR: ABNORMAL
WBC # BLD AUTO: 9.1 K/UL (ref 3.9–12.7)
WBC # FLD: 5148 /CU MM
WBC # FLD: NORMAL /CU MM
WBC #/AREA URNS AUTO: 0 /HPF (ref 0–5)

## 2020-10-11 PROCEDURE — 36415 COLL VENOUS BLD VENIPUNCTURE: CPT

## 2020-10-11 PROCEDURE — 87075 CULTR BACTERIA EXCEPT BLOOD: CPT | Mod: 59

## 2020-10-11 PROCEDURE — 99233 PR SUBSEQUENT HOSPITAL CARE,LEVL III: ICD-10-PCS | Mod: ,,, | Performed by: PHYSICIAN ASSISTANT

## 2020-10-11 PROCEDURE — 99233 SBSQ HOSP IP/OBS HIGH 50: CPT | Mod: ,,, | Performed by: PHYSICIAN ASSISTANT

## 2020-10-11 PROCEDURE — 85025 COMPLETE CBC W/AUTO DIFF WBC: CPT

## 2020-10-11 PROCEDURE — 81001 URINALYSIS AUTO W/SCOPE: CPT

## 2020-10-11 PROCEDURE — 25000003 PHARM REV CODE 250: Performed by: PHYSICIAN ASSISTANT

## 2020-10-11 PROCEDURE — 89051 BODY FLUID CELL COUNT: CPT

## 2020-10-11 PROCEDURE — 89060 EXAM SYNOVIAL FLUID CRYSTALS: CPT

## 2020-10-11 PROCEDURE — 87070 CULTURE OTHR SPECIMN AEROBIC: CPT | Mod: 59

## 2020-10-11 PROCEDURE — 63600175 PHARM REV CODE 636 W HCPCS: Performed by: INTERNAL MEDICINE

## 2020-10-11 PROCEDURE — 87102 FUNGUS ISOLATION CULTURE: CPT | Mod: 59

## 2020-10-11 PROCEDURE — 63600175 PHARM REV CODE 636 W HCPCS: Performed by: PHYSICIAN ASSISTANT

## 2020-10-11 PROCEDURE — 94761 N-INVAS EAR/PLS OXIMETRY MLT: CPT

## 2020-10-11 PROCEDURE — 25000003 PHARM REV CODE 250: Performed by: HOSPITALIST

## 2020-10-11 PROCEDURE — 87205 SMEAR GRAM STAIN: CPT

## 2020-10-11 PROCEDURE — 87206 SMEAR FLUORESCENT/ACID STAI: CPT | Mod: 91

## 2020-10-11 PROCEDURE — 25000003 PHARM REV CODE 250: Performed by: NURSE PRACTITIONER

## 2020-10-11 PROCEDURE — 84550 ASSAY OF BLOOD/URIC ACID: CPT

## 2020-10-11 PROCEDURE — 63600175 PHARM REV CODE 636 W HCPCS: Performed by: NURSE PRACTITIONER

## 2020-10-11 PROCEDURE — 80053 COMPREHEN METABOLIC PANEL: CPT

## 2020-10-11 PROCEDURE — 85652 RBC SED RATE AUTOMATED: CPT

## 2020-10-11 PROCEDURE — 84100 ASSAY OF PHOSPHORUS: CPT

## 2020-10-11 PROCEDURE — 87116 MYCOBACTERIA CULTURE: CPT

## 2020-10-11 PROCEDURE — 11000001 HC ACUTE MED/SURG PRIVATE ROOM

## 2020-10-11 PROCEDURE — 86140 C-REACTIVE PROTEIN: CPT

## 2020-10-11 PROCEDURE — 25000003 PHARM REV CODE 250: Performed by: INTERNAL MEDICINE

## 2020-10-11 PROCEDURE — 83735 ASSAY OF MAGNESIUM: CPT

## 2020-10-11 RX ORDER — COLCHICINE 0.6 MG/1
1.2 TABLET, FILM COATED ORAL ONCE
Status: COMPLETED | OUTPATIENT
Start: 2020-10-11 | End: 2020-10-11

## 2020-10-11 RX ORDER — COLCHICINE 0.6 MG/1
0.6 TABLET, FILM COATED ORAL DAILY
Status: DISCONTINUED | OUTPATIENT
Start: 2020-10-11 | End: 2020-10-15 | Stop reason: HOSPADM

## 2020-10-11 RX ORDER — PANTOPRAZOLE SODIUM 40 MG/1
40 TABLET, DELAYED RELEASE ORAL DAILY
Status: DISCONTINUED | OUTPATIENT
Start: 2020-10-11 | End: 2020-10-15 | Stop reason: HOSPADM

## 2020-10-11 RX ORDER — NAPROXEN 250 MG/1
250 TABLET ORAL 2 TIMES DAILY WITH MEALS
Status: DISCONTINUED | OUTPATIENT
Start: 2020-10-11 | End: 2020-10-15 | Stop reason: HOSPADM

## 2020-10-11 RX ADMIN — POTASSIUM BICARBONATE 25 MEQ: 978 TABLET, EFFERVESCENT ORAL at 12:10

## 2020-10-11 RX ADMIN — DIAZEPAM 5 MG: 5 TABLET ORAL at 08:10

## 2020-10-11 RX ADMIN — CEFTRIAXONE SODIUM 1 G: 1 INJECTION, POWDER, FOR SOLUTION INTRAMUSCULAR; INTRAVENOUS at 08:10

## 2020-10-11 RX ADMIN — VANCOMYCIN HYDROCHLORIDE 1500 MG: 1.5 INJECTION, POWDER, LYOPHILIZED, FOR SOLUTION INTRAVENOUS at 01:10

## 2020-10-11 RX ADMIN — COLCHICINE 0.6 MG: 0.6 TABLET, FILM COATED ORAL at 01:10

## 2020-10-11 RX ADMIN — HYDROCHLOROTHIAZIDE 12.5 MG: 12.5 TABLET ORAL at 08:10

## 2020-10-11 RX ADMIN — PANTOPRAZOLE SODIUM 40 MG: 40 TABLET, DELAYED RELEASE ORAL at 01:10

## 2020-10-11 RX ADMIN — METOPROLOL TARTRATE 100 MG: 100 TABLET ORAL at 08:10

## 2020-10-11 RX ADMIN — SODIUM CHLORIDE: 0.9 INJECTION, SOLUTION INTRAVENOUS at 03:10

## 2020-10-11 RX ADMIN — HEPARIN SODIUM 5000 UNITS: 5000 INJECTION INTRAVENOUS; SUBCUTANEOUS at 08:10

## 2020-10-11 RX ADMIN — AMLODIPINE BESYLATE 10 MG: 10 TABLET ORAL at 08:10

## 2020-10-11 RX ADMIN — COLCHICINE 1.2 MG: 0.6 TABLET, FILM COATED ORAL at 01:10

## 2020-10-11 RX ADMIN — ASPIRIN 81 MG: 81 TABLET, COATED ORAL at 08:10

## 2020-10-11 RX ADMIN — NAPROXEN 250 MG: 250 TABLET ORAL at 04:10

## 2020-10-11 RX ADMIN — SODIUM CHLORIDE: 0.9 INJECTION, SOLUTION INTRAVENOUS at 01:10

## 2020-10-11 RX ADMIN — FOLIC ACID 1 MG: 1 TABLET ORAL at 08:10

## 2020-10-11 RX ADMIN — POTASSIUM BICARBONATE 50 MEQ: 978 TABLET, EFFERVESCENT ORAL at 08:10

## 2020-10-11 RX ADMIN — THERA TABS 1 TABLET: TAB at 08:10

## 2020-10-11 RX ADMIN — HEPARIN SODIUM 5000 UNITS: 5000 INJECTION INTRAVENOUS; SUBCUTANEOUS at 06:10

## 2020-10-11 RX ADMIN — HEPARIN SODIUM 5000 UNITS: 5000 INJECTION INTRAVENOUS; SUBCUTANEOUS at 01:10

## 2020-10-11 RX ADMIN — DIAZEPAM 5 MG: 5 TABLET ORAL at 02:10

## 2020-10-11 RX ADMIN — Medication 100 MG: at 08:10

## 2020-10-11 RX ADMIN — FUROSEMIDE 40 MG: 40 TABLET ORAL at 08:10

## 2020-10-11 NOTE — ASSESSMENT & PLAN NOTE
Leukocytosis  - redness, warmth and tenderness noted to LLE on admission; reported pain since scratching it at home a few days ago  - 1/4 SIRS on admission w/ WBC 14; afebrile & HDS  - started on IV vanc on 10/07  - pt spiked fever on 10/08- Tmax 101. WBC increased from 12.85 to 13.98. Added ceftriaxone for broader coverage. WBC increased further on 10/09, CTX discontinued and started zosyn. ID consulted and recommending discontinuing zosyn and treating with vanc + CTX  - transition to PO on d/c

## 2020-10-11 NOTE — SUBJECTIVE & OBJECTIVE
Interval History: No AEON. Afebrile and WBC WNL.  Ortho aspirated bl knees this am.  The patient denies any recent fever, chills, or sweats.      Review of Systems   Constitutional: Positive for chills. Negative for diaphoresis and fever.   Respiratory: Negative for shortness of breath.    Cardiovascular: Negative for chest pain.   Gastrointestinal: Negative for abdominal pain, diarrhea, nausea and vomiting.   Genitourinary: Negative for dysuria and hematuria.   Musculoskeletal: Positive for arthralgias.   Skin: Positive for color change and rash.     Objective:     Vital Signs (Most Recent):  Temp: 98.5 °F (36.9 °C) (10/11/20 1613)  Pulse: 84 (10/11/20 1613)  Resp: (!) 24 (10/11/20 1613)  BP: 119/72 (10/11/20 1613)  SpO2: 96 % (10/11/20 1613) Vital Signs (24h Range):  Temp:  [98.5 °F (36.9 °C)-100.1 °F (37.8 °C)] 98.5 °F (36.9 °C)  Pulse:  [75-94] 84  Resp:  [17-24] 24  SpO2:  [94 %-98 %] 96 %  BP: (119-152)/(60-75) 119/72     Weight: 102.1 kg (225 lb)  Body mass index is 32.28 kg/m².    Estimated Creatinine Clearance: 95.5 mL/min (based on SCr of 0.8 mg/dL).    Physical Exam  Constitutional:       General: He is not in acute distress.     Appearance: He is well-developed. He is not diaphoretic.       HENT:      Head: Normocephalic and atraumatic.   Cardiovascular:      Rate and Rhythm: Normal rate and regular rhythm.      Heart sounds: Normal heart sounds. No murmur. No friction rub. No gallop.    Pulmonary:      Effort: Pulmonary effort is normal. No respiratory distress.      Breath sounds: Normal breath sounds. No wheezing or rales.   Abdominal:      General: Bowel sounds are normal. There is no distension.      Palpations: Abdomen is soft. There is no mass.      Tenderness: There is no abdominal tenderness. There is no guarding or rebound.   Skin:     General: Skin is warm and dry.   Neurological:      Mental Status: He is alert and oriented to person, place, and time.   Psychiatric:         Behavior: Behavior  normal.         Significant Labs:   Blood Culture:   Recent Labs   Lab 10/07/20  1110   LABBLOO No Growth to date  No Growth to date  No Growth to date  No Growth to date  No Growth to date  No Growth to date  No Growth to date  No Growth to date  No Growth to date  No Growth to date     CBC:   Recent Labs   Lab 10/10/20  0329 10/11/20  0250   WBC 11.28 9.10   HGB 13.0* 12.8*   HCT 40.0 37.7*    242     CMP:   Recent Labs   Lab 10/10/20  0329 10/11/20  0250   * 132*   K 3.3* 3.2*   CL 97 97   CO2 23 23   * 124*   BUN 8 11   CREATININE 0.8 0.8   CALCIUM 8.0* 7.9*   PROT 6.6 6.4   ALBUMIN 2.4* 2.3*   BILITOT 1.5* 1.2*   ALKPHOS 70 68   AST 29 45*   ALT 17 32   ANIONGAP 11 12   EGFRNONAA >60.0 >60.0     Wound Culture: No results for input(s): LABAERO in the last 4320 hours.  All pertinent labs within the past 24 hours have been reviewed.    Significant Imaging: I have reviewed all pertinent imaging results/findings within the past 24 hours.    Component Value Units Date/Time   X-Ray Knee 1 or 2 View Bilateral [[id number]] Resulted: 10/11/20 0923   Order Status: Completed Updated: 10/11/20 0925   Narrative:     EXAMINATION:   XR KNEE 1 OR 2 VIEW BILATERAL     CLINICAL HISTORY:   infection.  AP/ Lat;     TECHNIQUE:   Bilateral knee radiograph two views.     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     Left: There is moderate medial femorotibial and mild lateral femorotibial DJD with marginal osteophyte formation.  Minimal enthesopathic change at the patellar quadriceps insertion.     No definite fracture, dislocation, or osseous destruction in either knee.  Scattered vascular atherosclerosis.    Impression:       As above.       Electronically signed by: Agsu Bernardo   Date: 10/11/2020   Time: 09:23   X-Ray Knee AP Standing  Bilateral [[id number]] Resulted: 10/11/20 0710   Order Status: Completed Updated: 10/11/20 0713   Narrative:     EXAMINATION:   XR KNEE AP STANDING BILATERAL     CLINICAL HISTORY:   knee pain;     TECHNIQUE:   Difficulty getting complete exam given patient condition.  Two AP projection images of the knees were submitted.     COMPARISON:   None available     FINDINGS:   Right: There is severe femorotibial joint space narrowing with DJD and osteophyte formation, most pronounced at the lateral compartment.     Left: There is moderately advanced DJD with joint space narrowing of the medial femorotibial compartment.     No definite fracture.  Scattered vascular atherosclerosis.    Impression:       As above.       Electronically signed by: Agus Bernardo   Date: 10/11/2020   Time: 07:10   MRI Brain Epilepsy W W/O Contrast [[id number]] (Abnormal) Resulted: 10/07/20 2132   Order Status: Completed Updated: 10/07/20 2135   Narrative:     EXAMINATION:   MRI BRAIN EPILEPSY W W/O CONTRAST     CLINICAL HISTORY:   seizures;     TECHNIQUE:   Sagittal 3D space FLAIR and sagittal 3D  T1 MP rage which were reformatted in the coronal and sagittal planes.  Axial T2, axial gradient, axial T1 and axial diffusion imaging of the whole brain without contrast.  In addition coronal thin section high-resolution T2 imaging in oblique plane was performed without contrast.  Following contrast administration axial T1 and sagittal T1 3D spoiled gradient which was reformatted in the coronal and axial planes were performed.  10 ML Gadavist intravenous contrast.     COMPARISON:   CT 10/06/2020     FINDINGS:   Generalized cerebral volume loss with compensatory enlargement ventricles sulci and cisterns without hydrocephalus.  Focal T2 FLAIR signal hyperintensity within the right thalamus corresponding to hypodensity seen on CT without diffusion abnormality or enhancement compatible with remote lacunar-type infarction.  There is slight motion distortion  of the examination.  No abnormal parenchymal susceptibility to suggest parenchymal hemorrhage allowing for motion limitation.     There is no abnormal parenchymal enhancement.  There is numerous scattered patchy and confluent foci of T2 FLAIR signal hyperintensity supratentorial white matter while nonspecific concerning for moderate degree of chronic microvascular ischemic change.     Question slight asymmetrical volume loss right mesial temporal lobe compared the left although no significant asymmetrical signal abnormality cannot exclude right mesial temporal sclerosis in light of history of seizure activity.  Clinical correlation correlation with EEG findings advised.     This report was flagged in Epic as abnormal.    Impression:       Generalized cerebral volume loss slightly advanced for age with patchy and confluent regions of T2 FLAIR signal abnormality supratentorial white matter while nonspecific concerning for moderate degree of chronic microvascular ischemic change.     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     No evidence for acute infarction or intracranial enhancing mass lesion.     There is slight asymmetrical volume loss in the mesial temporal lobes right being slightly greater than left although no significant asymmetrical signal abnormality can not exclude right mesial temporal sclerosis.  Clinical correlation and correlation with EEG advised.       Electronically signed by: Evert Priest DO   Date: 10/07/2020   Time: 21:32   X-Ray Chest 1 View [[id number]] Resulted: 10/07/20 1243   Order Status: Completed Updated: 10/07/20 1245   Narrative:     EXAMINATION:   XR CHEST 1 VIEW     CLINICAL HISTORY:   Leukocytosis;     FINDINGS:   Heart size is normal.  Lungs are clear and the bones show nothing unusual.    Impression:       No acute process seen.       Electronically signed by: Reno Gallardo MD   Date: 10/07/2020   Time: 12:43   CT Head Without Contrast [[id number]] Resulted:  10/06/20 2253   Order Status: Completed Updated: 10/06/20 2256   Narrative:     EXAMINATION:   CT HEAD WITHOUT CONTRAST     CLINICAL HISTORY:   Altered mental status;     TECHNIQUE:   Low dose axial images were obtained through the head.  Coronal and sagittal reformations were also performed. Contrast was not administered.     COMPARISON:   None.     FINDINGS:   The ventricular system, sulcal pattern and parenchymal attenuation characteristics are consistent with chronic change.  Involutional changes noted, chronic appearing white matter change is noted.  There is no evidence for intracranial mass, mass effect or midline shift and there is no evidence for acute intracranial hemorrhage.  Appropriate CSF spaces are seen at the skull base.     The visualized orbits appear intact.  The mastoid air cells appear appropriate when accounting for averaging, mild opacity along the external auditory canal on the left may relate to cerumen.  Paranasal sinuses appear appropriate when accounting for averaging.    Impression:       Chronic intracranial changes are noted, there is no evidence for superimposed acute intracranial process.       Electronically signed by: Shai Car   Date: 10/06/2020   Time: 22:53   Imaging History    2020  Date Procedure Name Status Accession Number Location   10/11/20 09:14 AM X-Ray Knee 1 or 2 View Bilateral Final [id number] [facility]   10/10/20 06:07 PM X-Ray Knee AP Standing Bilateral Final [id number] [facility]   10/07/20 08:21 PM MRI Brain Epilepsy W W/O Contrast Final [id number] [facility]   10/07/20 12:25 PM X-Ray Chest 1 View Final [id number] [facility]   10/06/20 10:37 PM CT Head Without Contrast Final [id number] [facility]   10/07/20 01:13 PM Echo Color Flow Doppler? Yes Final [id number] [facility]

## 2020-10-11 NOTE — ASSESSMENT & PLAN NOTE
-Patient known to drink 10-12 beers/day.  Last ETOH just prior to syncopal episode, 4 cans on day of admission  -Monitor for s/s withdrawal  -CIWA Q8h  -Primary team to be notified if CIWA>10  -Thiamine, Folate, MVI daily  - PETH pending  - started on diazepam 5mg TID for withdrawal ppx, increased to 10 mg tid on 10/8 in setting of fever and increased confusion/agitation. No further signs or symptoms of withdrawal, will taper to 5 mg. titrate as needed

## 2020-10-11 NOTE — ASSESSMENT & PLAN NOTE
74 yo male admitted with Sz likely secondary to hyponatremia and ETOH w/drawl found to have cellulitis LLE and leukocytosis.  On exam there is concern for R knee infection.  - on vanc and zosyn  - leukocytosis resolved - poss secondary to seizure/cellulitis  - stable non septic  - Knee aspiration shows CaPyrophosphate crystals bl - pseudogout  - LLE cellulitis much improved.    Plan:  1. Continue Vanc and ceftriaxone  2. Knees likely with gout only but will fu cultures tomorrow and if no growth then home on po abx  3. Home on Doxycycline 100mg po bid and cefadroxil 1g po bid x 10d (to complete 14d total of abx for LLE cellulitis)  4. Will follow  5. Discussed with primary team

## 2020-10-11 NOTE — SUBJECTIVE & OBJECTIVE
Past Medical History:   Diagnosis Date    Alcohol abuse     Arthritis     Convulsion 10/7/2020    GERD (gastroesophageal reflux disease)     Glaucoma     Hypertension        Past Surgical History:   Procedure Laterality Date    KNEE CARTILAGE SURGERY Right        Review of patient's allergies indicates:  No Known Allergies    Current Facility-Administered Medications   Medication    0.9%  NaCl infusion    acetaminophen tablet 650 mg    albuterol-ipratropium 2.5 mg-0.5 mg/3 mL nebulizer solution 3 mL    amLODIPine tablet 10 mg    aspirin EC tablet 81 mg    bisacodyL suppository 10 mg    cefTRIAXone injection 1 g    dextrose 50% injection 12.5 g    dextrose 50% injection 25 g    diazePAM tablet 5 mg    folic acid tablet 1 mg    furosemide tablet 40 mg    glucagon (human recombinant) injection 1 mg    glucose chewable tablet 16 g    glucose chewable tablet 24 g    heparin (porcine) injection 5,000 Units    hydroCHLOROthiazide tablet 12.5 mg    HYDROcodone-acetaminophen 5-325 mg per tablet 1 tablet    lidocaine 5 % patch 2 patch    LORazepam tablet 2 mg    melatonin tablet 6 mg    metoprolol tartrate (LOPRESSOR) tablet 100 mg    multivitamin tablet    ondansetron disintegrating tablet 8 mg    ondansetron injection 4 mg    polyethylene glycol packet 17 g    potassium bicarbonate disintegrating tablet 25 mEq    thiamine tablet 100 mg    vancomycin 1.5 g in dextrose 5 % 250 mL IVPB (ready to mix)     Family History     Problem Relation (Age of Onset)    Hypertension Mother, Brother        Tobacco Use    Smoking status: Never Smoker    Smokeless tobacco: Never Used   Substance and Sexual Activity    Alcohol use: Yes     Alcohol/week: 70.0 standard drinks     Types: 70 Standard drinks or equivalent per week     Comment: drinks between 8-10 beers daily    Drug use: No    Sexual activity: Yes     Partners: Female     ROS     Per primary note    Objective:     Vital Signs (Most  "Recent):  Temp: 98.9 °F (37.2 °C) (10/11/20 0724)  Pulse: 83 (10/11/20 0724)  Resp: 17 (10/11/20 0724)  BP: (!) 142/73 (10/11/20 0724)  SpO2: 97 % (10/11/20 0724) Vital Signs (24h Range):  Temp:  [98.7 °F (37.1 °C)-100.1 °F (37.8 °C)] 98.9 °F (37.2 °C)  Pulse:  [75-94] 83  Resp:  [17-25] 17  SpO2:  [94 %-100 %] 97 %  BP: (139-152)/(69-75) 142/73     Weight: 102.1 kg (225 lb)  Height: 5' 10" (177.8 cm)  Body mass index is 32.28 kg/m².      Intake/Output Summary (Last 24 hours) at 10/11/2020 0957  Last data filed at 10/11/2020 0733  Gross per 24 hour   Intake 1950 ml   Output 1350 ml   Net 600 ml       Ortho/SPM Exam    PE:  Gen:  No acute distress  CV:  Peripherally well-perfused.    Lungs:  Normal respiratory effort.  Head/Neck:  Normocephalic.  Atraumatic.     RLE:  Skin intact.  Incision over lateral knee from ligament surgery 40 years ago.   No skin erythema  Edema surrounding rknee  TTP about right knee  Compartments soft  Severe pain with minor ROM knee  SILT Sa/Meyers/DP/SP/T  Motor intact EHL/FHL/TA/Gastroc  1+ DP    LLE:  Skin intact  Erythema on anterior leg extending just distal to tibial tuberosity to ankle.   No TTP  Compartments soft  ROM knee limited due to pain, less painful than right.   SILT Sa/Meyers/DP/SP/T  Motor intact EHL/FHL/TA/Gastroc  1+ DP      Significant Labs: All pertinent labs within the past 24 hours have been reviewed.    R Knee Arthrocentesis  85088 cells  87% segs  + pseudogout crystals    L Knee Arthrocentesis  5148 cells  88% segs  + pseudogout crystals      Significant Imaging: X-Ray: I have reviewed all pertinent results/findings and my personal findings are:  Bilateral Knee.    R Knee: Severe OA with significant erosion of joint space in medial, lateral, and patellofemoral compartments.  Significant osteophytosis. No signs of fracture or dislocation.  Calcific artery disease of popliteal vessel noted.     L Knee: Evidence of osteoarthritis with medial and lateral joint space narrowing " with osteophytosis.  Medial compartment more affected than latera.  No evidence of fracture or dislocation.    Calcific artery disease of popliteal vessel noted.

## 2020-10-11 NOTE — HPI
Roshan Rankin is a 75 y.o. male with a significant medical history of HTN, ETOH abuse (10-12 beers every day) who presented to the ED on 10/6 after a syncopal fall vs seizure at home.  Patient found to have leukocytosis and hyponatremia in ED, prompting inpatient admission to medicine.  Neurology consulted and diagnosed the patient with triphasic seizure secondary to hyponatremia, attributed to alcohol abuse.     Infectious disease consulted yesterday for left leg cellulitis which was noted after admission. ID started the patient on IV Vancomycin and Ceftriaxone.  Patient has been on Vanc since 10/8, Ceftriaxone since 10/10 (was on zosyn 10/8-10/10).  ID noted concern for right knee septic arthritis, prompting consultation to orthopedics today.     Patient ambulates without assistance at home.  Not on blood thinners. Is retired, and lives at home with his wife.

## 2020-10-11 NOTE — ASSESSMENT & PLAN NOTE
-Patient known to drink 10-12 beers/day.  Last ETOH just prior to syncopal episode, 4 cans on day of admission  -Monitor for s/s withdrawal  -CIWA Q8h  -Primary team to be notified if CIWA>10  -Thiamine, Folate, MVI daily  - PETH pending  - started on diazepam 5mg TID for withdrawal ppx, increased to 10 mg tid on 10/8 in setting of fever and increased confusion/agitation. No further signs or symptoms of withdrawal, will taper to 5 mg today. titrate as needed

## 2020-10-11 NOTE — ASSESSMENT & PLAN NOTE
Knee pain, bilateral  Pt complaining of bilateral knee pain. He reports that the pain is chronic due to bilateral arthritis and is an 8/10 at home on a normal basis. He had 2 surgeries on his L knee many years ago after a football injury. He walks with a limp at baseline due to the L knee pain. However, pain has increased during hospitalization.    - Physical exam remarkable for pain with ROM bilaterally and warmth to R knee. No bony tenderness to palpation, no joint swelling or erythema noted, 5/5 strength bilaterally- although assessment limited 2/2 pain  - PT/OT consulted- recommending rehab  - ID concern for R knee infection, ortho surgery consulted for septic arthritis rule out  - X-ray knees bilaterally:   R Knee: Severe OA with significant erosion of joint space in medial, lateral, and patellofemoral compartments.  Significant osteophytosis. No signs of fracture or dislocation.  Calcific artery disease of popliteal vessel noted.    L Knee: Evidence of osteoarthritis with medial and lateral joint space narrowing with osteophytosis.  Medial compartment more affected than latera.  No evidence of fracture or dislocation. Calcific artery disease of popliteal vessel noted.   - Arthrocentesis performed of bilateral knees. Both knees had cell count <50k and were positive for pseudogout.  - Uric acid WNL  - no concern at this time for septic arthritis. no orthopedic follow up necessary  - Start colchicine 1.2 mg once then 0.6 mg daily  - Naproxen 250 bid  - lidocaine patch prn

## 2020-10-11 NOTE — PROGRESS NOTES
"[facility] Medicine  Progress Note    Patient Name: Roshan Rankin  MRN: [id number]  Patient Class: IP- Inpatient   Admission Date: 10/6/2020  Length of Stay: 2 days  Attending Physician: Micky Crockett MD  Primary Care Provider: Ben Brannon MD        Subjective:     Principal Problem:Convulsion        HPI:  Roshan Rankin is a 75 y.o. male with a significant medical history of HTN, ETOH abuse (10-12 beers every day) who presents to the hospital complaining of syncopal episode.  HPI information gathered from review of the patient's medical record due to the patient being unaware of preceding events.  The patient's wife was inside the house when she heard a thud in the garage.  She went out and found the patient on his chest w/his eye closed and labored breathing.  She denies tremors or tonic clonic seizure like activity.  No bowel or urinary incontinence was reported.  The patient had blood in his mouth indicating he may have bitten his tongue.  He was not responsive to his wife's questions so she called 911.  The patient was brought to the ED for evaluation.  There was no reported history of seizures.   Shortly after arrival to the ED the patient began to return to baseline.  He did not recall falling or if he had any preceding symptoms.  The patient denied CP, SOB, HA, change in vision, dizziness, or N/V.  ED work up included labs that revealed a WBC 14.71, Na 131.  Other labs grossly normal.  Currently the patient is AA&O x3.  He is c/o right sided chest pain that he describes as "like I pulled a muscle".  He has no other complaints at this time.  Per his nurse, the patient's speed to respond has improved since his arrival in his room.  The patient is admitted to Hospital Medicine.    Overview/Hospital Course:  Mr. Rankin was admitted to hospital medicine service for evaluation of possible syncopal episodes with convulsions. In the ED, he was witnessed to have a generalized " tonic clonic lasting 10-15 seconds with tongue biting. Was given total of 2 g IV Keppra then started on 500 mg BID. CT head WO contrast unremarkable for acute intracranial pathology. Labs with leukocytosis (14) and hyponatremia (131). Patient with notable LLE cellulitis on exam, started on IV vanc. Suspect cellulitis etiology of leukocytosis. WBC increased on 10/8 and pt spiked fever of 101. Added Ceftriaxone for broader coverage and increased valium for possible etiology of fevers being alcohol withdrawal.  Transitioned patient from CTX to zosyn due to persistent leukocytosis and consulted ID. Transitioned to vanc + CTX per ID recs.    No previous hx of seizures or similar episodes. Pt and wife did say he drank less alcohol yon day of admission (~4 beers all day), but denies recent cut back in alcohol consumption. Neuro consulted, suspect cluster of seizures (3 within 24 hr period) was provoked in the setting of hyponatremia likely due to chronic alcohol abuse. MRI w w/o contrast showed generalized volume loss and moderate degree of chronic microvascular ischemic disease. Small R thalamic lacunar infarct. Slight asymmetrical volume loss in the mesial temporal lobes R slightly greater than L. No acute intracranial pathology. 24 hr vEEG showed mild, generalized, non-specific cerebral dysfunction. Neurology suspect alcohol withdrawal provoked seizure, and no AEDs at this time. Will continue IV antibiotic therapy, alcohol withdrawal treatment, and seizure precautions.    Interval History: No acute events overnight. Patient seen at bedside this am. He has had no further episodes of confusion, agitation, or other withdrawal symptoms. Left hand resting tremor noted on physical exam today, pt states that it has been present for years. Reports bilateral knee pain with no improvement. X-ray pending. ID consulted for evaluation and antibiotic recs- recommend discontinuing zosyn and treatment with vanc + CTX.     Review of  Systems   Constitutional: Positive for activity change. Negative for chills and fever.   Respiratory: Negative for chest tightness and shortness of breath.    Cardiovascular: Negative for chest pain and leg swelling.   Gastrointestinal: Negative for abdominal pain and nausea.   Musculoskeletal: Positive for arthralgias (chronic bilateral knee pain). Negative for joint swelling and neck stiffness.   Skin: Positive for color change and wound.   Neurological: Positive for tremors. Negative for dizziness, seizures, syncope, facial asymmetry, speech difficulty, weakness and light-headedness.   Psychiatric/Behavioral: Negative for confusion. The patient is not nervous/anxious.      Objective:     Vital Signs (Most Recent):  Temp: 99.7 °F (37.6 °C) (10/10/20 1500)  Pulse: 86 (10/10/20 1635)  Resp: 20 (10/10/20 1500)  BP: (!) 140/69 (10/10/20 1500)  SpO2: 96 % (10/10/20 1500) Vital Signs (24h Range):  Temp:  [98.2 °F (36.8 °C)-99.7 °F (37.6 °C)] 99.7 °F (37.6 °C)  Pulse:  [71-90] 86  Resp:  [19-30] 20  SpO2:  [96 %-100 %] 96 %  BP: (131-148)/(65-69) 140/69     Weight: 102.1 kg (225 lb)  Body mass index is 32.28 kg/m².  No intake or output data in the 24 hours ending 10/10/20 1854   Physical Exam  Vitals signs and nursing note reviewed.   Constitutional:       Appearance: He is well-developed.   Eyes:      General: No scleral icterus.     Extraocular Movements: Extraocular movements intact.   Cardiovascular:      Rate and Rhythm: Normal rate and regular rhythm.      Pulses: Normal pulses.   Pulmonary:      Effort: Pulmonary effort is normal.      Breath sounds: Normal breath sounds.   Abdominal:      General: Bowel sounds are normal.      Palpations: Abdomen is soft.      Tenderness: There is no abdominal tenderness. There is no guarding or rebound.   Musculoskeletal: Normal range of motion.         General: Swelling present. No tenderness.      Comments: Swelling and erythema noted of the left lower extremity. No warmth or  tenderness noted. Improving.    Pain with ROM of knees bilaterally. No bony tenderness, deformity, or swelling noted. Chronic according to patient, but more bothersome with prolonged rest.   Skin:     General: Skin is warm and dry.      Findings: Erythema present.   Neurological:      General: No focal deficit present.      Mental Status: He is alert and oriented to person, place, and time.      Sensory: No sensory deficit.      Motor: Tremor (L hand at rest) present. No weakness.      Comments: Strength exam limited in lower extremity due to pain, but able to lift both legs against gravity spontaneously.    oriented x 3         Significant Labs: All pertinent labs within the past 24 hours have been reviewed.    Significant Imaging: I have reviewed all pertinent imaging results/findings within the past 24 hours.      Assessment/Plan:      * Convulsion  Syncope    Roshan Rankin is a 75 y.o. male with a significant medical history of HTN, ETOH abuse (10-12 beers every day) who presents to the hospital complaining of syncopal episode.  The patient was found down in his garage by his wife, unresponsive with labored respirations.  He slowly returned to baseline while in the ED. Prior to admission the patient had a witnessed episode of convulsion.  He was loaded with Keppra 1 gm.    - 2D Echo: EF 65% with normal systolic & diastolic function  -  Neurology consulted, suspect cluster of seizures (3 within 24 hr period) was provoked in the setting of hyponatremia likely due to chronic alcohol abuse  - started on Keppra 500 mg BID on admission, held per neurology recs  - MRI brain W WO contrast (epilepsy protocol):   Generalized cerebral volume loss slightly advanced for age with patchy and confluent regions of T2 FLAIR signal abnormality supratentorial white matter.   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  No evidence for acute infarction or intracranial enhancing mass lesion.  There is slight  "asymmetrical volume loss in the mesial temporal lobes right being slightly greater than left although no significant asymmetrical signal abnormality can not exclude right mesial temporal sclerosis.    - 24 hr EEG shows mild, generalized, non-specific cerebral dysfunction.  Assessment of activity in the right temporal lobe is significantly limited.   - per neurology, clear to discontinue continuous EEG monitoring at this time.   - No AEDs recommended at this time  - CIWA protocol as noted below  - Seizure precautions  - aspiration precautions  - neuro checks q4h    Cellulitis of left lower extremity  Leukocytosis  - redness, warmth and tenderness noted to LLE on admission; reported pain since scratching it at home a few days ago  - 1/4 SIRS on admission w/ WBC 14; afebrile & HDS  - started on IV vanc on 10/07  - pt spiked fever on 10/08- Tmax 101. WBC increased from 12.85 to 13.98. Added ceftriaxone for broader coverage. WBC increased further on 10/09, CTX discontinued and started zosyn. ID consulted and recommending discontinuing zosyn and treating with vanc + CTX  - transition to PO on d/c    Other chest pain  -Patient c/o right sided chest pain that he describes as "like I pulled a muscle".  Not reproducible on exam.  -Troponin <0.006  - BNP 97  - EKG: no acute changes  - resolved    Hyponatremia  -Na 131 on admission.  - cc/hr  - improved to 133, continue to monitor with daily labs    Alcohol abuse  -Patient known to drink 10-12 beers/day.  Last ETOH just prior to syncopal episode, 4 cans on day of admission  -Monitor for s/s withdrawal  -CIWA Q8h  -Primary team to be notified if CIWA>10  -Thiamine, Folate, MVI daily  - PETH pending  - started on diazepam 5mg TID for withdrawal ppx, increased to 10 mg tid on 10/8 in setting of fever and increased confusion/agitation. No further signs or symptoms of withdrawal, will taper to 5 mg today. titrate as needed    Essential hypertension  -Continue home Norvasc, " HCTZ, metoprolol, and lasix  -Continuing home meds may compensate for s/s of ETOH withdrawals.  Other s/s such as tremors, diaphoresis, hallucinations, etc may be more reliable indicators.    Knee pain, bilateral  - pt complaining of bilateral knee pain. He reports that the pain is chronic and is an 8/10 at home on a normal basis. Pain has increased during hospitalization due to decreased use  - Pt reports history of arthritis bilaterally. He has had 2 surgeries on his L knee years ago. He walks with a limp at baseline due to chronic knee pain.    - Physical exam remarkable for pain with ROM bilaterally. No bony tenderness to palpation, no joint swelling or erythema noted, 5/5 strength bilaterally- although assessment limited 2/2 pain  - PT/OT consulted  - X-ray bilateral knees pending.   - ID concern for R knee infection, will consult ortho surgery in am for evaluation  - lidocaine patch prn      VTE Risk Mitigation (From admission, onward)         Ordered     heparin (porcine) injection 5,000 Units  Every 8 hours      10/07/20 0109     IP VTE HIGH RISK PATIENT  Once      10/07/20 0109     Place sequential compression device  Until discontinued      10/07/20 0109                Discharge Planning   SHANE: 10/11/2020     Code Status: Full Code   Is the patient medically ready for discharge?: No    Reason for patient still in hospital (select all that apply): Patient trending condition, Treatment, Imaging, Consult recommendations and Pending disposition  Discharge Plan A: Home, Home with family   Discharge Delays: None known at this time            Discussed with staff, Keira Velasco PA-C  Department of Hospital Medicine   [facility]

## 2020-10-11 NOTE — CONSULTS
[facility]  Orthopedics  Consult Note    Patient Name: Roshan Rankin  MRN: [id number]  Admission Date: 10/6/2020  Hospital Length of Stay: 3 days  Attending Provider: Micky Crockett MD  Primary Care Provider: Ben Brannon MD      Inpatient consult to Orthopedic Surgery  Consult performed by: Connor Chacon MD  Consult ordered by: Anuja Velasco PA-C        Subjective:     Principal Problem:Convulsion    Chief Complaint:   Chief Complaint   Patient presents with    Fall     pt with 2nd fall today. Altered mental status after 2nd fall, +ETOH, +blood thinners per wife. Abrasion to side of face.        HPI: Roshan Rankin is a 75 y.o. male with a significant medical history of HTN, ETOH abuse (10-12 beers every day) who presented to the ED on 10/6 after a syncopal fall vs seizure at home.  Patient found to have leukocytosis and hyponatremia in ED, prompting inpatient admission to medicine.  Neurology consulted and diagnosed the patient with triphasic seizure secondary to hyponatremia, attributed to alcohol abuse.     Infectious disease consulted yesterday for left leg cellulitis which was noted after admission. ID started the patient on IV Vancomycin and Ceftriaxone.  Patient has been on Vanc since 10/8, Ceftriaxone since 10/10 (was on zosyn 10/8-10/10).  ID noted concern for right knee septic arthritis, prompting consultation to orthopedics today.     Patient ambulates without assistance at home.  Not on blood thinners. Is retired, and lives at home with his wife.       Past Medical History:   Diagnosis Date    Alcohol abuse     Arthritis     Convulsion 10/7/2020    GERD (gastroesophageal reflux disease)     Glaucoma     Hypertension        Past Surgical History:   Procedure Laterality Date    KNEE CARTILAGE SURGERY Right        Review of patient's allergies indicates:  No Known Allergies    Current Facility-Administered Medications   Medication    0.9%  NaCl infusion     "acetaminophen tablet 650 mg    albuterol-ipratropium 2.5 mg-0.5 mg/3 mL nebulizer solution 3 mL    amLODIPine tablet 10 mg    aspirin EC tablet 81 mg    bisacodyL suppository 10 mg    cefTRIAXone injection 1 g    dextrose 50% injection 12.5 g    dextrose 50% injection 25 g    diazePAM tablet 5 mg    folic acid tablet 1 mg    furosemide tablet 40 mg    glucagon (human recombinant) injection 1 mg    glucose chewable tablet 16 g    glucose chewable tablet 24 g    heparin (porcine) injection 5,000 Units    hydroCHLOROthiazide tablet 12.5 mg    HYDROcodone-acetaminophen 5-325 mg per tablet 1 tablet    lidocaine 5 % patch 2 patch    LORazepam tablet 2 mg    melatonin tablet 6 mg    metoprolol tartrate (LOPRESSOR) tablet 100 mg    multivitamin tablet    ondansetron disintegrating tablet 8 mg    ondansetron injection 4 mg    polyethylene glycol packet 17 g    potassium bicarbonate disintegrating tablet 25 mEq    thiamine tablet 100 mg    vancomycin 1.5 g in dextrose 5 % 250 mL IVPB (ready to mix)     Family History     Problem Relation (Age of Onset)    Hypertension Mother, Brother        Tobacco Use    Smoking status: Never Smoker    Smokeless tobacco: Never Used   Substance and Sexual Activity    Alcohol use: Yes     Alcohol/week: 70.0 standard drinks     Types: 70 Standard drinks or equivalent per week     Comment: drinks between 8-10 beers daily    Drug use: No    Sexual activity: Yes     Partners: Female     ROS     Per primary note    Objective:     Vital Signs (Most Recent):  Temp: 98.9 °F (37.2 °C) (10/11/20 0724)  Pulse: 83 (10/11/20 0724)  Resp: 17 (10/11/20 0724)  BP: (!) 142/73 (10/11/20 0724)  SpO2: 97 % (10/11/20 0724) Vital Signs (24h Range):  Temp:  [98.7 °F (37.1 °C)-100.1 °F (37.8 °C)] 98.9 °F (37.2 °C)  Pulse:  [75-94] 83  Resp:  [17-25] 17  SpO2:  [94 %-100 %] 97 %  BP: (139-152)/(69-75) 142/73     Weight: 102.1 kg (225 lb)  Height: 5' 10" (177.8 cm)  Body mass index is " 32.28 kg/m².      Intake/Output Summary (Last 24 hours) at 10/11/2020 0957  Last data filed at 10/11/2020 0733  Gross per 24 hour   Intake 1950 ml   Output 1350 ml   Net 600 ml       Ortho/SPM Exam    PE:  Gen:  No acute distress  CV:  Peripherally well-perfused.    Lungs:  Normal respiratory effort.  Head/Neck:  Normocephalic.  Atraumatic.     RLE:  Skin intact.  Incision over lateral knee from ligament surgery 40 years ago.   No skin erythema  Edema surrounding rknee  TTP about right knee  Compartments soft  Severe pain with minor ROM knee  SILT Sa/Meyers/DP/SP/T  Motor intact EHL/FHL/TA/Gastroc  1+ DP    LLE:  Skin intact  Erythema on anterior leg extending just distal to tibial tuberosity to ankle.   No TTP  Compartments soft  ROM knee limited due to pain, less painful than right.   SILT Sa/Meyers/DP/SP/T  Motor intact EHL/FHL/TA/Gastroc  1+ DP      Significant Labs: All pertinent labs within the past 24 hours have been reviewed.    R Knee Arthrocentesis  27981 cells  87% segs  + pseudogout crystals    L Knee Arthrocentesis  5148 cells  88% segs  + pseudogout crystals      Significant Imaging: X-Ray: I have reviewed all pertinent results/findings and my personal findings are:  Bilateral Knee.    R Knee: Severe OA with significant erosion of joint space in medial, lateral, and patellofemoral compartments.  Significant osteophytosis. No signs of fracture or dislocation.  Calcific artery disease of popliteal vessel noted.     L Knee: Evidence of osteoarthritis with medial and lateral joint space narrowing with osteophytosis.  Medial compartment more affected than latera.  No evidence of fracture or dislocation.    Calcific artery disease of popliteal vessel noted.           Assessment/Plan:     Knee pain, bilateral  Roshan Rankin is a 75 y.o. male admitted for hyponatremia and cellulitis.  Ortho consulted to rule out septic arthritis, bilaterally.  Arthrocentesis performed of bilateral knees.  Both knees had cell count  <50k and were positive for pseudogout, establishing our diagnosis.      - Rheumatologic management for treatment of psedugout  - no concern at this time for septic arthritis.  - no orthopedic follow up necessary            Connor Chacon MD  Orthopedics  [facility]

## 2020-10-11 NOTE — ASSESSMENT & PLAN NOTE
Roshan Rankin is a 75 y.o. male admitted for hyponatremia and cellulitis.  Ortho consulted to rule out septic arthritis, bilaterally.  Arthrocentesis performed of bilateral knees.  Both knees had cell count <50k and were positive for pseudogout, establishing our diagnosis.      - Rheumatologic management for treatment of psedugout  - no concern at this time for septic arthritis.  - no orthopedic follow up necessary

## 2020-10-11 NOTE — ASSESSMENT & PLAN NOTE
- pt complaining of bilateral knee pain. He reports that the pain is chronic and is an 8/10 at home on a normal basis. Pain has increased during hospitalization due to decreased use  - Pt reports history of arthritis bilaterally. He has had 2 surgeries on his L knee years ago. He walks with a limp at baseline due to chronic knee pain.    - Physical exam remarkable for pain with ROM bilaterally. No bony tenderness to palpation, no joint swelling or erythema noted, 5/5 strength bilaterally- although assessment limited 2/2 pain  - PT/OT consulted  - X-ray bilateral knees pending.   - ID concern for R knee infection, will consult ortho surgery in am for evaluation  - lidocaine patch prn

## 2020-10-11 NOTE — PROGRESS NOTES
[facility] - ICU 14 WT  Infectious Disease  Progress Note    Patient Name: Roshan Rankin  MRN: [id number]  Admission Date: 10/6/2020  Length of Stay: 3 days  Attending Physician: Micky Crockett MD  Primary Care Provider: Ben Brannon MD    Isolation Status: No active isolations  Assessment/Plan:      Cellulitis of left lower extremity  76 yo male admitted with Sz likely secondary to hyponatremia and ETOH w/drawl found to have cellulitis LLE and leukocytosis.  On exam there is concern for R knee infection.  - on vanc and zosyn  - leukocytosis resolved - poss secondary to seizure/cellulitis  - stable non septic  - Knee aspiration shows CaPyrophosphate crystals bl - pseudogout  - LLE cellulitis much improved.    Plan:  1. Continue Vanc and ceftriaxone  2. Knees likely with gout only but will fu cultures tomorrow and if no growth then home on po abx  3. Home on Doxycycline 100mg po bid and cefadroxil 1g po bid x 10d (to complete 14d total of abx for LLE cellulitis)  4. Will follow  5. Discussed with primary team        Anticipated Disposition: tbd    Thank you for your consult. I will follow-up with patient. Please contact us if you have any additional questions.    CARLOS Mack  Infectious Disease  [facility] - ICU 14 WT    Subjective:     Principal Problem:Convulsion    HPI: 75 y.o. male with HTN and alcohol abuse (10-12 beers daily) presented to ED 10/6/20 after 2 LOC episodes at home. Wife reports first episode occurred around 10 am. Pt was found lying on his side on the floor in the garage making abnormally loud respirations. Pt thought he had fainted. Fisher tired afterwards, ate lunch and rested for a few hours. Later that evening, wife was inside and heard a thud in the garage. Once again he was found lying on the ground with abnormally loud respirations and this time blood coming out of his mouth. Wife called EMS and in the ED, he was witnessed to have a 3rd episode of  tonic-clonic movements and tongue biting. He was given 1 g of Keppra x 2 then started on 500 mg BID. Labs remarkable for leukocytosis (14.71>>12.85) and hyponatremia (131). CT head WO contrast unremarkable for acute intracranial pathology. Placed on CIWA protocol and started on folic acid and thiamine given alcohol intake history. Wife says he had an abnormally small amount of alcohol yesterday, probably ~4 beers, but otherwise has not cut back on drinking lately. Denies any previous hx of seizures, family hx of epilepsy, CNS infections. Remote head trauma from football. Denied taking medications that lower the seizure threshold.     Patient admitted and blood cultures drawn and are NGTD.  Had isolated fever to 101 on 1/8 and none since.  WBC has been elevated to 14s now WNL.  Patient treated with Vanc then Vanc/CTX and now vanc and zosyn given WBC wbc and concern for LE cellulitis.  The patient denies any recent fever, chills, or sweats.  ID consulted for evaluation and ABX recs.     Interval History: No AEON. Afebrile and WBC WNL.  Ortho aspirated bl knees this am.  The patient denies any recent fever, chills, or sweats.      Review of Systems   Constitutional: Positive for chills. Negative for diaphoresis and fever.   Respiratory: Negative for shortness of breath.    Cardiovascular: Negative for chest pain.   Gastrointestinal: Negative for abdominal pain, diarrhea, nausea and vomiting.   Genitourinary: Negative for dysuria and hematuria.   Musculoskeletal: Positive for arthralgias.   Skin: Positive for color change and rash.     Objective:     Vital Signs (Most Recent):  Temp: 98.5 °F (36.9 °C) (10/11/20 1613)  Pulse: 84 (10/11/20 1613)  Resp: (!) 24 (10/11/20 1613)  BP: 119/72 (10/11/20 1613)  SpO2: 96 % (10/11/20 1613) Vital Signs (24h Range):  Temp:  [98.5 °F (36.9 °C)-100.1 °F (37.8 °C)] 98.5 °F (36.9 °C)  Pulse:  [75-94] 84  Resp:  [17-24] 24  SpO2:  [94 %-98 %] 96 %  BP: (119-152)/(60-75) 119/72     Weight:  102.1 kg (225 lb)  Body mass index is 32.28 kg/m².    Estimated Creatinine Clearance: 95.5 mL/min (based on SCr of 0.8 mg/dL).    Physical Exam  Constitutional:       General: He is not in acute distress.     Appearance: He is well-developed. He is not diaphoretic.       HENT:      Head: Normocephalic and atraumatic.   Cardiovascular:      Rate and Rhythm: Normal rate and regular rhythm.      Heart sounds: Normal heart sounds. No murmur. No friction rub. No gallop.    Pulmonary:      Effort: Pulmonary effort is normal. No respiratory distress.      Breath sounds: Normal breath sounds. No wheezing or rales.   Abdominal:      General: Bowel sounds are normal. There is no distension.      Palpations: Abdomen is soft. There is no mass.      Tenderness: There is no abdominal tenderness. There is no guarding or rebound.   Skin:     General: Skin is warm and dry.   Neurological:      Mental Status: He is alert and oriented to person, place, and time.   Psychiatric:         Behavior: Behavior normal.         Significant Labs:   Blood Culture:   Recent Labs   Lab 10/07/20  1110   LABBLOO No Growth to date  No Growth to date  No Growth to date  No Growth to date  No Growth to date  No Growth to date  No Growth to date  No Growth to date  No Growth to date  No Growth to date     CBC:   Recent Labs   Lab 10/10/20  0329 10/11/20  0250   WBC 11.28 9.10   HGB 13.0* 12.8*   HCT 40.0 37.7*    242     CMP:   Recent Labs   Lab 10/10/20  0329 10/11/20  0250   * 132*   K 3.3* 3.2*   CL 97 97   CO2 23 23   * 124*   BUN 8 11   CREATININE 0.8 0.8   CALCIUM 8.0* 7.9*   PROT 6.6 6.4   ALBUMIN 2.4* 2.3*   BILITOT 1.5* 1.2*   ALKPHOS 70 68   AST 29 45*   ALT 17 32   ANIONGAP 11 12   EGFRNONAA >60.0 >60.0     Wound Culture: No results for input(s): LABAERO in the last 4320 hours.  All pertinent labs within the past 24 hours have been reviewed.    Significant Imaging: I have reviewed all pertinent imaging  results/findings within the past 24 hours.    Component Value Units Date/Time   X-Ray Knee 1 or 2 View Bilateral [[id number]] Resulted: 10/11/20 0923   Order Status: Completed Updated: 10/11/20 0925   Narrative:     EXAMINATION:   XR KNEE 1 OR 2 VIEW BILATERAL     CLINICAL HISTORY:   infection.  AP/ Lat;     TECHNIQUE:   Bilateral knee radiograph two views.     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     Left: There is moderate medial femorotibial and mild lateral femorotibial DJD with marginal osteophyte formation.  Minimal enthesopathic change at the patellar quadriceps insertion.     No definite fracture, dislocation, or osseous destruction in either knee.  Scattered vascular atherosclerosis.    Impression:       As above.       Electronically signed by: Agus Bernardo   Date: 10/11/2020   Time: 09:23   X-Ray Knee AP Standing Bilateral [[id number]] Resulted: 10/11/20 0710   Order Status: Completed Updated: 10/11/20 0713   Narrative:     EXAMINATION:   XR KNEE AP STANDING BILATERAL     CLINICAL HISTORY:   knee pain;     TECHNIQUE:   Difficulty getting complete exam given patient condition.  Two AP projection images of the knees were submitted.     COMPARISON:   None available     FINDINGS:   Right: There is severe femorotibial joint space narrowing with DJD and osteophyte formation, most pronounced at the lateral compartment.     Left: There is moderately advanced DJD with joint space narrowing of the medial femorotibial compartment.     No definite fracture.  Scattered vascular atherosclerosis.    Impression:       As above.       Electronically signed by: Agus Bernardo   Date: 10/11/2020   Time: 07:10   MRI Brain Epilepsy W W/O Contrast [[id number]] (Abnormal) Resulted: 10/07/20 2132   Order Status: Completed Updated: 10/07/20 2135   Narrative:      EXAMINATION:   MRI BRAIN EPILEPSY W W/O CONTRAST     CLINICAL HISTORY:   seizures;     TECHNIQUE:   Sagittal 3D space FLAIR and sagittal 3D  T1 MP rage which were reformatted in the coronal and sagittal planes.  Axial T2, axial gradient, axial T1 and axial diffusion imaging of the whole brain without contrast.  In addition coronal thin section high-resolution T2 imaging in oblique plane was performed without contrast.  Following contrast administration axial T1 and sagittal T1 3D spoiled gradient which was reformatted in the coronal and axial planes were performed.  10 ML Gadavist intravenous contrast.     COMPARISON:   CT 10/06/2020     FINDINGS:   Generalized cerebral volume loss with compensatory enlargement ventricles sulci and cisterns without hydrocephalus.  Focal T2 FLAIR signal hyperintensity within the right thalamus corresponding to hypodensity seen on CT without diffusion abnormality or enhancement compatible with remote lacunar-type infarction.  There is slight motion distortion of the examination.  No abnormal parenchymal susceptibility to suggest parenchymal hemorrhage allowing for motion limitation.     There is no abnormal parenchymal enhancement.  There is numerous scattered patchy and confluent foci of T2 FLAIR signal hyperintensity supratentorial white matter while nonspecific concerning for moderate degree of chronic microvascular ischemic change.     Question slight asymmetrical volume loss right mesial temporal lobe compared the left although no significant asymmetrical signal abnormality cannot exclude right mesial temporal sclerosis in light of history of seizure activity.  Clinical correlation correlation with EEG findings advised.     This report was flagged in Epic as abnormal.    Impression:       Generalized cerebral volume loss slightly advanced for age with patchy and confluent regions of T2 FLAIR signal abnormality supratentorial white matter while nonspecific concerning for  moderate degree of chronic microvascular ischemic change.     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     No evidence for acute infarction or intracranial enhancing mass lesion.     There is slight asymmetrical volume loss in the mesial temporal lobes right being slightly greater than left although no significant asymmetrical signal abnormality can not exclude right mesial temporal sclerosis.  Clinical correlation and correlation with EEG advised.       Electronically signed by: Evert Priest DO   Date: 10/07/2020   Time: 21:32   X-Ray Chest 1 View [[id number]] Resulted: 10/07/20 1243   Order Status: Completed Updated: 10/07/20 1245   Narrative:     EXAMINATION:   XR CHEST 1 VIEW     CLINICAL HISTORY:   Leukocytosis;     FINDINGS:   Heart size is normal.  Lungs are clear and the bones show nothing unusual.    Impression:       No acute process seen.       Electronically signed by: Reno Gallardo MD   Date: 10/07/2020   Time: 12:43   CT Head Without Contrast [[id number]] Resulted: 10/06/20 2253   Order Status: Completed Updated: 10/06/20 2256   Narrative:     EXAMINATION:   CT HEAD WITHOUT CONTRAST     CLINICAL HISTORY:   Altered mental status;     TECHNIQUE:   Low dose axial images were obtained through the head.  Coronal and sagittal reformations were also performed. Contrast was not administered.     COMPARISON:   None.     FINDINGS:   The ventricular system, sulcal pattern and parenchymal attenuation characteristics are consistent with chronic change.  Involutional changes noted, chronic appearing white matter change is noted.  There is no evidence for intracranial mass, mass effect or midline shift and there is no evidence for acute intracranial hemorrhage.  Appropriate CSF spaces are seen at the skull base.     The visualized orbits appear intact.  The mastoid air cells appear appropriate when accounting for averaging, mild opacity along the external auditory canal on the left may relate to  cerumen.  Paranasal sinuses appear appropriate when accounting for averaging.    Impression:       Chronic intracranial changes are noted, there is no evidence for superimposed acute intracranial process.       Electronically signed by: Shai Car   Date: 10/06/2020   Time: 22:53   Imaging History    2020  Date Procedure Name Status Accession Number Location   10/11/20 09:14 AM X-Ray Knee 1 or 2 View Bilateral Final [id number] [facility]   10/10/20 06:07 PM X-Ray Knee AP Standing Bilateral Final [id number] [facility]   10/07/20 08:21 PM MRI Brain Epilepsy W W/O Contrast Final [id number] [facility]   10/07/20 12:25 PM X-Ray Chest 1 View Final [id number] [facility]   10/06/20 10:37 PM CT Head Without Contrast Final [id number] [facility]   10/07/20 01:13 PM Echo Color Flow Doppler? Yes Final [id number] [facility]

## 2020-10-11 NOTE — PROGRESS NOTES
"[facility] Medicine  Progress Note    Patient Name: Roshan Rankin  MRN: [id number]  Patient Class: IP- Inpatient   Admission Date: 10/6/2020  Length of Stay: 3 days  Attending Physician: Micky Crockett MD  Primary Care Provider: Ben Brannon MD        Subjective:     Principal Problem:Convulsion        HPI:  Roshan Rankin is a 75 y.o. male with a significant medical history of HTN, ETOH abuse (10-12 beers every day) who presents to the hospital complaining of syncopal episode.  HPI information gathered from review of the patient's medical record due to the patient being unaware of preceding events.  The patient's wife was inside the house when she heard a thud in the garage.  She went out and found the patient on his chest w/his eye closed and labored breathing.  She denies tremors or tonic clonic seizure like activity.  No bowel or urinary incontinence was reported.  The patient had blood in his mouth indicating he may have bitten his tongue.  He was not responsive to his wife's questions so she called 911.  The patient was brought to the ED for evaluation.  There was no reported history of seizures.   Shortly after arrival to the ED the patient began to return to baseline.  He did not recall falling or if he had any preceding symptoms.  The patient denied CP, SOB, HA, change in vision, dizziness, or N/V.  ED work up included labs that revealed a WBC 14.71, Na 131.  Other labs grossly normal.  Currently the patient is AA&O x3.  He is c/o right sided chest pain that he describes as "like I pulled a muscle".  He has no other complaints at this time.  Per his nurse, the patient's speed to respond has improved since his arrival in his room.  The patient is admitted to Hospital Medicine.    Overview/Hospital Course:  Mr. Rankin was admitted to hospital medicine service for evaluation of possible syncopal episodes with convulsions. In the ED, he was witnessed to have a generalized " tonic clonic lasting 10-15 seconds with tongue biting. Was given total of 2 g IV Keppra then started on 500 mg BID. CT head WO contrast unremarkable for acute intracranial pathology. Labs with leukocytosis (14) and hyponatremia (131). No previous hx of seizures or similar episodes. Pt and wife did say he drank less alcohol on day of admission (~4 beers all day), but denies recent cut back in alcohol consumption. Neuro consulted, suspect cluster of seizures (3 within 24 hr period) was provoked in the setting of hyponatremia likely due to chronic alcohol abuse. MRI w w/o contrast showed generalized volume loss and moderate degree of chronic microvascular ischemic disease. Small R thalamic lacunar infarct. Slight asymmetrical volume loss in the mesial temporal lobes R slightly greater than L. No acute intracranial pathology. 24 hr vEEG showed mild, generalized, non-specific cerebral dysfunction. Neurology suspect alcohol withdrawal provoked seizure, and no AEDs at this time. Pt treated with valium taper for alcohol withdrawal. Continue seizure precautions    Patient with notable LLE cellulitis on exam, started on IV vanc on 10/07. Suspect cellulitis etiology of leukocytosis. Bcx NGTD. WBC increased on 10/8 and pt spiked fever of 101. Added Ceftriaxone for broader coverage and increased valium for possible alcohol withdrawal sxs.  Transitioned patient from CTX to zosyn due to persistent leukocytosis and consulted ID. Transitioned to vanc + CTX per ID recs. Consulted ortho surgery for evaluation of bilateral knee pain to r/o septic arthritis. Joint fluid revealed pseudogout. Pt treated with colchicine and naproxen.    PT/OT recommending rehab. Follow up with CM.    Interval History: No acute events overnight. Patient seen at bedside this a.m after arthrocentesis. He reports mild relief in knee pain with procedure. He denies any episodes of confusion, agitation, tremors. Bilateral knee x-ray with no signs of fracture or  dislocation, but significant erosion of joint space. Both knees had cell count <50k and were positive for pseudogout. Treatment started with colchicine and naproxen. Will continue valium, antibiotics, and following with  for disposition.    Review of Systems   Constitutional: Positive for activity change. Negative for chills and fever.   Respiratory: Negative for chest tightness and shortness of breath.    Cardiovascular: Negative for chest pain and leg swelling.   Gastrointestinal: Negative for abdominal pain and nausea.   Musculoskeletal: Positive for arthralgias (chronic bilateral knee pain) and joint swelling. Negative for neck stiffness.   Skin: Positive for color change.   Neurological: Positive for tremors. Negative for dizziness, seizures, syncope, facial asymmetry, speech difficulty, weakness and light-headedness.   Psychiatric/Behavioral: Negative for confusion. The patient is not nervous/anxious.      Objective:     Vital Signs (Most Recent):  Temp: 98.5 °F (36.9 °C) (10/11/20 1613)  Pulse: 84 (10/11/20 1613)  Resp: (!) 24 (10/11/20 1613)  BP: 119/72 (10/11/20 1613)  SpO2: 96 % (10/11/20 1613) Vital Signs (24h Range):  Temp:  [98.5 °F (36.9 °C)-100.1 °F (37.8 °C)] 98.5 °F (36.9 °C)  Pulse:  [75-94] 84  Resp:  [17-24] 24  SpO2:  [94 %-98 %] 96 %  BP: (119-152)/(60-75) 119/72     Weight: 102.1 kg (225 lb)  Body mass index is 32.28 kg/m².    Intake/Output Summary (Last 24 hours) at 10/11/2020 1636  Last data filed at 10/11/2020 1100  Gross per 24 hour   Intake 1950 ml   Output 2070 ml   Net -120 ml      Physical Exam  Vitals signs and nursing note reviewed.   Constitutional:       Appearance: He is well-developed.   Eyes:      General: No scleral icterus.     Extraocular Movements: Extraocular movements intact.   Cardiovascular:      Rate and Rhythm: Normal rate and regular rhythm.      Pulses: Normal pulses.   Pulmonary:      Effort: Pulmonary effort is normal.      Breath sounds: Normal breath sounds.    Abdominal:      General: Bowel sounds are normal.      Palpations: Abdomen is soft.      Tenderness: There is no abdominal tenderness. There is no guarding or rebound.   Musculoskeletal: Normal range of motion.         General: Swelling present. No tenderness.      Comments: Swelling and erythema noted of the left lower extremity. No warmth or tenderness noted. Improving.    Pain with ROM of knees bilaterally. Warmth of R knee. No bony tenderness, deformity, or swelling noted.   Skin:     General: Skin is warm and dry.      Findings: Erythema present.   Neurological:      General: No focal deficit present.      Mental Status: He is alert and oriented to person, place, and time.      Sensory: No sensory deficit.      Motor: Tremor (L hand at rest) present. No weakness.         Significant Labs: All pertinent labs within the past 24 hours have been reviewed.    Significant Imaging: I have reviewed all pertinent imaging results/findings within the past 24 hours.      Assessment/Plan:      * Convulsion  Syncope    Roshan Rankin is a 75 y.o. male with a significant medical history of HTN, ETOH abuse (10-12 beers every day) who presents to the hospital complaining of syncopal episode.  The patient was found down in his garage by his wife, unresponsive with labored respirations.  He slowly returned to baseline while in the ED. Prior to admission the patient had a witnessed episode of convulsion.  He was loaded with Keppra 1 gm.    - 2D Echo: EF 65% with normal systolic & diastolic function  -  Neurology consulted, suspect cluster of seizures (3 within 24 hr period) was provoked in the setting of hyponatremia likely due to chronic alcohol abuse  - started on Keppra 500 mg BID on admission, held per neurology recs  - MRI brain W WO contrast (epilepsy protocol):   Generalized cerebral volume loss slightly advanced for age with patchy and confluent regions of T2 FLAIR signal abnormality supratentorial white matter.   There  is a small remote right thalamic lacunar type infarct.  No evidence for acute infarction or intracranial enhancing mass lesion.  There is slight asymmetrical volume loss in the mesial temporal lobes right being slightly greater than left although no significant asymmetrical signal abnormality can not exclude right mesial temporal sclerosis.    - 24 hr EEG shows mild, generalized, non-specific cerebral dysfunction.  Assessment of activity in the right temporal lobe is significantly limited.   - per neurology, clear to discontinue continuous EEG monitoring at this time.   - No AEDs recommended at this time  - WA protocol as noted below  - Seizure precautions  - aspiration precautions  - neuro checks q4h    Cellulitis of left lower extremity  Leukocytosis  - redness, warmth and tenderness noted to LLE on admission; reported pain since scratching it at home a few days ago  - 1/4 SIRS on admission w/ WBC 14; afebrile & HDS  - started on IV vanc on 10/07  - pt spiked fever on 10/08- Tmax 101. WBC increased from 12.85 to 13.98. Added ceftriaxone for broader coverage. WBC increased further on 10/09, CTX discontinued and started zosyn. ID consulted and recommending discontinuing zosyn and treating with vanc + CTX  - transition to PO on d/c    Pseudogout involving multiple joints  Knee pain, bilateral  Pt complaining of bilateral knee pain. He reports that the pain is chronic due to bilateral arthritis and is an 8/10 at home on a normal basis. He had 2 surgeries on his L knee many years ago after a football injury. He walks with a limp at baseline due to the L knee pain. However, pain has increased during hospitalization.    - Physical exam remarkable for pain with ROM bilaterally and warmth to R knee. No bony tenderness to palpation, no joint swelling or erythema noted, 5/5 strength bilaterally- although assessment limited 2/2 pain  - PT/OT consulted- recommending rehab  - ID concern for R knee infection, ortho surgery  "consulted for septic arthritis rule out  - X-ray knees bilaterally:   R Knee: Severe OA with significant erosion of joint space in medial, lateral, and patellofemoral compartments.  Significant osteophytosis. No signs of fracture or dislocation.  Calcific artery disease of popliteal vessel noted.    L Knee: Evidence of osteoarthritis with medial and lateral joint space narrowing with osteophytosis.  Medial compartment more affected than latera.  No evidence of fracture or dislocation. Calcific artery disease of popliteal vessel noted.   - Arthrocentesis performed of bilateral knees. Both knees had cell count <50k and were positive for pseudogout.  - Uric acid WNL  - no concern at this time for septic arthritis. no orthopedic follow up necessary  - Start colchicine 1.2 mg once then 0.6 mg daily  - Naproxen 250 bid  - lidocaine patch prn    Other chest pain  -Patient c/o right sided chest pain that he describes as "like I pulled a muscle".  Not reproducible on exam.  -Troponin <0.006  - BNP 97  - EKG: no acute changes  - resolved    Hyponatremia  -Na 131 on admission.  - cc/hr  - improved to 133, continue to monitor with daily labs    Alcohol abuse  -Patient known to drink 10-12 beers/day.  Last ETOH just prior to syncopal episode, 4 cans on day of admission  -Monitor for s/s withdrawal  -CIWA Q8h  -Primary team to be notified if CIWA>10  -Thiamine, Folate, MVI daily  - PETH pending  - started on diazepam 5mg TID for withdrawal ppx, increased to 10 mg tid on 10/8 in setting of fever and increased confusion/agitation. No further signs or symptoms of withdrawal, will taper to 5 mg. titrate as needed    Essential hypertension  -Continue home Norvasc, HCTZ, metoprolol, and lasix  -Continuing home meds may compensate for s/s of ETOH withdrawals.  Other s/s such as tremors, diaphoresis, hallucinations, etc may be more reliable indicators.    VTE Risk Mitigation (From admission, onward)         Ordered     heparin " (porcine) injection 5,000 Units  Every 8 hours      10/07/20 0109     IP VTE HIGH RISK PATIENT  Once      10/07/20 0109     Place sequential compression device  Until discontinued      10/07/20 0109                Discharge Planning   SHANE: 10/12/2020     Code Status: Full Code   Is the patient medically ready for discharge?: No    Reason for patient still in hospital (select all that apply): Patient trending condition, Treatment, Consult recommendations and Pending disposition  Discharge Plan A: Home, Home with family   Discharge Delays: None known at this time            Discussed with staff, Keira Velasco PA-C  Department of Hospital Medicine   [facility]

## 2020-10-11 NOTE — SUBJECTIVE & OBJECTIVE
Interval History: No acute events overnight. Patient seen at bedside this a.m after arthrocentesis. He reports mild relief in knee pain with procedure. He denies any episodes of confusion, agitation, tremors. Bilateral knee x-ray with no signs of fracture or dislocation, but significant erosion of joint space. Both knees had cell count <50k and were positive for pseudogout. Treatment started with colchicine and naproxen. Will continue valium, antibiotics, and following with  for disposition.    Review of Systems   Constitutional: Positive for activity change. Negative for chills and fever.   Respiratory: Negative for chest tightness and shortness of breath.    Cardiovascular: Negative for chest pain and leg swelling.   Gastrointestinal: Negative for abdominal pain and nausea.   Musculoskeletal: Positive for arthralgias (chronic bilateral knee pain) and joint swelling. Negative for neck stiffness.   Skin: Positive for color change.   Neurological: Positive for tremors. Negative for dizziness, seizures, syncope, facial asymmetry, speech difficulty, weakness and light-headedness.   Psychiatric/Behavioral: Negative for confusion. The patient is not nervous/anxious.      Objective:     Vital Signs (Most Recent):  Temp: 98.5 °F (36.9 °C) (10/11/20 1613)  Pulse: 84 (10/11/20 1613)  Resp: (!) 24 (10/11/20 1613)  BP: 119/72 (10/11/20 1613)  SpO2: 96 % (10/11/20 1613) Vital Signs (24h Range):  Temp:  [98.5 °F (36.9 °C)-100.1 °F (37.8 °C)] 98.5 °F (36.9 °C)  Pulse:  [75-94] 84  Resp:  [17-24] 24  SpO2:  [94 %-98 %] 96 %  BP: (119-152)/(60-75) 119/72     Weight: 102.1 kg (225 lb)  Body mass index is 32.28 kg/m².    Intake/Output Summary (Last 24 hours) at 10/11/2020 1636  Last data filed at 10/11/2020 1100  Gross per 24 hour   Intake 1950 ml   Output 2070 ml   Net -120 ml      Physical Exam  Vitals signs and nursing note reviewed.   Constitutional:       Appearance: He is well-developed.   Eyes:      General: No scleral  icterus.     Extraocular Movements: Extraocular movements intact.   Cardiovascular:      Rate and Rhythm: Normal rate and regular rhythm.      Pulses: Normal pulses.   Pulmonary:      Effort: Pulmonary effort is normal.      Breath sounds: Normal breath sounds.   Abdominal:      General: Bowel sounds are normal.      Palpations: Abdomen is soft.      Tenderness: There is no abdominal tenderness. There is no guarding or rebound.   Musculoskeletal: Normal range of motion.         General: Swelling present. No tenderness.      Comments: Swelling and erythema noted of the left lower extremity. No warmth or tenderness noted. Improving.    Pain with ROM of knees bilaterally. Warmth of R knee. No bony tenderness, deformity, or swelling noted.   Skin:     General: Skin is warm and dry.      Findings: Erythema present.   Neurological:      General: No focal deficit present.      Mental Status: He is alert and oriented to person, place, and time.      Sensory: No sensory deficit.      Motor: Tremor (L hand at rest) present. No weakness.         Significant Labs: All pertinent labs within the past 24 hours have been reviewed.    Significant Imaging: I have reviewed all pertinent imaging results/findings within the past 24 hours.

## 2020-10-12 LAB
BACTERIA BLD CULT: NORMAL
BACTERIA BLD CULT: NORMAL
BASOPHILS # BLD AUTO: 0.04 K/UL (ref 0–0.2)
BASOPHILS NFR BLD: 0.5 % (ref 0–1.9)
CREAT SERPL-MCNC: 0.9 MG/DL (ref 0.5–1.4)
DIFFERENTIAL METHOD: ABNORMAL
EOSINOPHIL # BLD AUTO: 0.2 K/UL (ref 0–0.5)
EOSINOPHIL NFR BLD: 1.9 % (ref 0–8)
ERYTHROCYTE [DISTWIDTH] IN BLOOD BY AUTOMATED COUNT: 12.5 % (ref 11.5–14.5)
EST. GFR  (AFRICAN AMERICAN): >60 ML/MIN/1.73 M^2
EST. GFR  (NON AFRICAN AMERICAN): >60 ML/MIN/1.73 M^2
HCT VFR BLD AUTO: 37.8 % (ref 40–54)
HGB BLD-MCNC: 12.5 G/DL (ref 14–18)
IMM GRANULOCYTES # BLD AUTO: 0.05 K/UL (ref 0–0.04)
IMM GRANULOCYTES NFR BLD AUTO: 0.6 % (ref 0–0.5)
LYMPHOCYTES # BLD AUTO: 1.1 K/UL (ref 1–4.8)
LYMPHOCYTES NFR BLD: 13.6 % (ref 18–48)
MCH RBC QN AUTO: 31.9 PG (ref 27–31)
MCHC RBC AUTO-ENTMCNC: 33.1 G/DL (ref 32–36)
MCV RBC AUTO: 96 FL (ref 82–98)
MONOCYTES # BLD AUTO: 1.3 K/UL (ref 0.3–1)
MONOCYTES NFR BLD: 16.2 % (ref 4–15)
NEUTROPHILS # BLD AUTO: 5.3 K/UL (ref 1.8–7.7)
NEUTROPHILS NFR BLD: 67.2 % (ref 38–73)
NRBC BLD-RTO: 0 /100 WBC
PATH INTERP FLD-IMP: NORMAL
PLATELET # BLD AUTO: 263 K/UL (ref 150–350)
PMV BLD AUTO: 10.4 FL (ref 9.2–12.9)
RBC # BLD AUTO: 3.92 M/UL (ref 4.6–6.2)
VANCOMYCIN TROUGH SERPL-MCNC: 16.2 UG/ML (ref 10–22)
WBC # BLD AUTO: 7.89 K/UL (ref 3.9–12.7)

## 2020-10-12 PROCEDURE — 99233 SBSQ HOSP IP/OBS HIGH 50: CPT | Mod: ,,, | Performed by: PHYSICIAN ASSISTANT

## 2020-10-12 PROCEDURE — 25000003 PHARM REV CODE 250: Performed by: HOSPITALIST

## 2020-10-12 PROCEDURE — 97535 SELF CARE MNGMENT TRAINING: CPT

## 2020-10-12 PROCEDURE — 97116 GAIT TRAINING THERAPY: CPT

## 2020-10-12 PROCEDURE — 30200315 PPD INTRADERMAL TEST REV CODE 302: Performed by: HOSPITALIST

## 2020-10-12 PROCEDURE — 97530 THERAPEUTIC ACTIVITIES: CPT

## 2020-10-12 PROCEDURE — 25000003 PHARM REV CODE 250: Performed by: PHYSICIAN ASSISTANT

## 2020-10-12 PROCEDURE — 99233 PR SUBSEQUENT HOSPITAL CARE,LEVL III: ICD-10-PCS | Mod: ,,, | Performed by: PHYSICIAN ASSISTANT

## 2020-10-12 PROCEDURE — 25000003 PHARM REV CODE 250: Performed by: INTERNAL MEDICINE

## 2020-10-12 PROCEDURE — 86580 TB INTRADERMAL TEST: CPT | Performed by: HOSPITALIST

## 2020-10-12 PROCEDURE — 63600175 PHARM REV CODE 636 W HCPCS: Performed by: INTERNAL MEDICINE

## 2020-10-12 PROCEDURE — 36415 COLL VENOUS BLD VENIPUNCTURE: CPT

## 2020-10-12 PROCEDURE — 82565 ASSAY OF CREATININE: CPT

## 2020-10-12 PROCEDURE — 25000003 PHARM REV CODE 250: Performed by: NURSE PRACTITIONER

## 2020-10-12 PROCEDURE — 80202 ASSAY OF VANCOMYCIN: CPT

## 2020-10-12 PROCEDURE — 85025 COMPLETE CBC W/AUTO DIFF WBC: CPT

## 2020-10-12 PROCEDURE — 63600175 PHARM REV CODE 636 W HCPCS: Performed by: NURSE PRACTITIONER

## 2020-10-12 PROCEDURE — 11000001 HC ACUTE MED/SURG PRIVATE ROOM

## 2020-10-12 RX ORDER — CEFADROXIL 1000 MG/1
1 TABLET ORAL EVERY 12 HOURS
Status: DISCONTINUED | OUTPATIENT
Start: 2020-10-12 | End: 2020-10-15 | Stop reason: HOSPADM

## 2020-10-12 RX ORDER — DIAZEPAM 5 MG/1
5 TABLET ORAL 2 TIMES DAILY
Status: DISPENSED | OUTPATIENT
Start: 2020-10-12 | End: 2020-10-13

## 2020-10-12 RX ORDER — DOXYCYCLINE HYCLATE 100 MG
100 TABLET ORAL EVERY 12 HOURS
Status: DISCONTINUED | OUTPATIENT
Start: 2020-10-12 | End: 2020-10-15 | Stop reason: HOSPADM

## 2020-10-12 RX ADMIN — THERA TABS 1 TABLET: TAB at 08:10

## 2020-10-12 RX ADMIN — DIAZEPAM 5 MG: 5 TABLET ORAL at 08:10

## 2020-10-12 RX ADMIN — MELATONIN TAB 3 MG 6 MG: 3 TAB at 08:10

## 2020-10-12 RX ADMIN — DOXYCYCLINE HYCLATE 100 MG: 100 TABLET, COATED ORAL at 08:10

## 2020-10-12 RX ADMIN — METOPROLOL TARTRATE 100 MG: 100 TABLET ORAL at 08:10

## 2020-10-12 RX ADMIN — FUROSEMIDE 40 MG: 40 TABLET ORAL at 08:10

## 2020-10-12 RX ADMIN — VANCOMYCIN HYDROCHLORIDE 1500 MG: 1.5 INJECTION, POWDER, LYOPHILIZED, FOR SOLUTION INTRAVENOUS at 06:10

## 2020-10-12 RX ADMIN — Medication 100 MG: at 08:10

## 2020-10-12 RX ADMIN — FOLIC ACID 1 MG: 1 TABLET ORAL at 08:10

## 2020-10-12 RX ADMIN — COLCHICINE 0.6 MG: 0.6 TABLET, FILM COATED ORAL at 08:10

## 2020-10-12 RX ADMIN — CEFADROXIL 1 G: 1 TABLET, FILM COATED ORAL at 08:10

## 2020-10-12 RX ADMIN — PANTOPRAZOLE SODIUM 40 MG: 40 TABLET, DELAYED RELEASE ORAL at 08:10

## 2020-10-12 RX ADMIN — HEPARIN SODIUM 5000 UNITS: 5000 INJECTION INTRAVENOUS; SUBCUTANEOUS at 10:10

## 2020-10-12 RX ADMIN — SODIUM CHLORIDE: 0.9 INJECTION, SOLUTION INTRAVENOUS at 10:10

## 2020-10-12 RX ADMIN — NAPROXEN 250 MG: 250 TABLET ORAL at 08:10

## 2020-10-12 RX ADMIN — TUBERCULIN PURIFIED PROTEIN DERIVATIVE 5 UNITS: 5 INJECTION, SOLUTION INTRADERMAL at 04:10

## 2020-10-12 RX ADMIN — HEPARIN SODIUM 5000 UNITS: 5000 INJECTION INTRAVENOUS; SUBCUTANEOUS at 01:10

## 2020-10-12 RX ADMIN — NAPROXEN 250 MG: 250 TABLET ORAL at 04:10

## 2020-10-12 RX ADMIN — AMLODIPINE BESYLATE 10 MG: 10 TABLET ORAL at 08:10

## 2020-10-12 RX ADMIN — ASPIRIN 81 MG: 81 TABLET, COATED ORAL at 08:10

## 2020-10-12 NOTE — PLAN OF CARE
Problem: Physical Therapy Goal  Goal: Physical Therapy Goal  Description: Goals to be met by: 10/26/2020     Patient will increase functional independence with mobility by performin. Supine to sit with Contact Guard Assistance  2. Sit to supine with Contact Guard Assistance  3. Sit to stand transfer with Minimal Assistance  4. Bed to chair transfer with Minimal Assistance using LRAD.  5. Gait  x 25 feet with Minimal Assistance using LRAD.    6. Lower extremity exercise program x30 reps per handout, with independence    Outcome: Ongoing, Progressing     Patient progressing towards goals, but due to decreased activity tolerance than anticipated discharge recommendations updated to Skilled Nursing Facility.     Talia Thomson, PT, DPT  10/12/2020

## 2020-10-12 NOTE — PROGRESS NOTES
Pharmacokinetic Assessment Follow Up: IV Vancomycin    Vancomycin serum concentration assessment/plan:  · Vancomycin level was drawn late, and resulted at 16.2 mcg/mL.  · True trough can be extrapolated and is closer to 18.8 mcg/mL  · Within definitive target range of 10 to 20 mcg/mL, but on upper end and given age and slight accumulation will reduce dose.  · Will change regimen to Vancomycin 1250 mg IV every 12 hours with next serum trough concentration measured at 0500 prior to 4th dose on 10/14      Drug levels (last 3 results):  Recent Labs   Lab Result Units 10/10/20  1207 10/12/20  0425   Vancomycin-Trough ug/mL 16.4 16.2       Pharmacy will continue to follow and monitor vancomycin.    Please contact pharmacy at extension [phone] for questions regarding this assessment.    Thank you for the consult,   Fang Chapin       Patient brief summary:  Roshan Rankin is a 75 y.o. male initiated on antimicrobial therapy with IV Vancomycin for treatment of skin & soft tissue infection    The patient's current regimen is 1500 q 12 hours    Drug Allergies:   Review of patient's allergies indicates:  No Known Allergies    Actual Body Weight:   102.1 kg    Renal Function:   Estimated Creatinine Clearance: 95.5 mL/min (based on SCr of 0.8 mg/dL).,     Dialysis Method (if applicable):  N/A    CBC (last 72 hours):  Recent Labs   Lab Result Units 10/10/20  0329 10/11/20  0250 10/12/20  0847   WBC K/uL 11.28 9.10 7.89   Hemoglobin g/dL 13.0* 12.8* 12.5*   Hematocrit % 40.0 37.7* 37.8*   Platelets K/uL 217 242 263   Gran% % 70.5 66.0 67.2   Lymph% % 14.0* 15.8* 13.6*   Mono% % 14.4 17.3* 16.2*   Eosinophil% % 0.2 0.4 1.9   Basophil% % 0.3 0.3 0.5   Differential Method  Automated Automated Automated       Metabolic Panel (last 72 hours):  Recent Labs   Lab Result Units 10/10/20  0329 10/11/20  0250 10/11/20  1258   Sodium mmol/L 131* 132*  --    Potassium mmol/L 3.3* 3.2*  --    Chloride mmol/L 97 97  --    CO2 mmol/L 23 23  --     Glucose mg/dL 122* 124*  --    Glucose, UA   --   --  Negative   BUN, Bld mg/dL 8 11  --    Creatinine mg/dL 0.8 0.8  --    Albumin g/dL 2.4* 2.3*  --    Total Bilirubin mg/dL 1.5* 1.2*  --    Alkaline Phosphatase U/L 70 68  --    AST U/L 29 45*  --    ALT U/L 17 32  --    Magnesium mg/dL 1.9 1.9  --    Phosphorus mg/dL 2.6* 3.7  --        Vancomycin Administrations:  vancomycin given in the last 96 hours                   vancomycin 1.5 g in dextrose 5 % 250 mL IVPB (ready to mix) (mg) 1,500 mg New Bag 10/12/20 0611     1,500 mg New Bag 10/11/20 1322     1,500 mg New Bag  0110     1,500 mg New Bag 10/10/20 1451     1,500 mg New Bag  0157     1,500 mg New Bag 10/09/20 1730     1,500 mg New Bag  0200     1,500 mg New Bag 10/08/20 1530                Microbiologic Results:  Microbiology Results (last 7 days)     Procedure Component Value Units Date/Time    Aerobic culture [[id number]] Collected: 10/11/20 0858    Order Status: Completed Specimen: Joint Fluid from Knee, Left Updated: 10/12/20 0913     Aerobic Bacterial Culture No growth    Aerobic culture [[id number]] Collected: 10/11/20 0858    Order Status: Completed Specimen: Joint Fluid from Knee, Left Updated: 10/12/20 0913     Aerobic Bacterial Culture No growth    Culture, Anaerobic [[id number]] Collected: 10/11/20 0858    Order Status: Completed Specimen: Joint Fluid from Knee, Left Updated: 10/12/20 0627     Anaerobic Culture Culture in progress    Culture, Anaerobic [[id number]] Collected: 10/11/20 0858    Order Status: Completed Specimen: Joint Fluid from Knee, Left Updated: 10/12/20 0627     Anaerobic Culture Culture in progress    Blood culture [[id number]] Collected: 10/07/20 1110    Order Status: Completed Specimen: Blood from Antecubital, Right Hand Updated: 10/11/20 1412     Blood Culture, Routine No Growth to date      No Growth to date      No Growth to date      No Growth to date      No Growth to date    Blood culture [[id number]] Collected: 10/07/20  1110    Order Status: Completed Specimen: Blood from Antecubital, Right Arm Updated: 10/11/20 1412     Blood Culture, Routine No Growth to date      No Growth to date      No Growth to date      No Growth to date      No Growth to date    Gram stain [[id number]] Collected: 10/11/20 0858    Order Status: Completed Specimen: Joint Fluid from Knee, Left Updated: 10/11/20 1011     Gram Stain Result Rare WBC's      No organisms seen    Gram stain [[id number]] Collected: 10/11/20 0858    Order Status: Completed Specimen: Joint Fluid from Knee, Left Updated: 10/11/20 1010     Gram Stain Result Few WBC's      No organisms seen    Fungus culture [[id number]] Collected: 10/11/20 0858    Order Status: Sent Specimen: Joint Fluid from Knee, Left Updated: 10/11/20 0922    AFB Culture & Smear [[id number]] Collected: 10/11/20 0858    Order Status: Sent Specimen: Joint Fluid from Knee, Left Updated: 10/11/20 0922    Fungus culture [[id number]] Collected: 10/11/20 0858    Order Status: Sent Specimen: Joint Fluid from Knee, Left Updated: 10/11/20 0917    AFB Culture & Smear [[id number]] Collected: 10/11/20 0858    Order Status: Sent Specimen: Joint Fluid from Knee, Left Updated: 10/11/20 0917

## 2020-10-12 NOTE — PLAN OF CARE
Pt is AAOx3.Pt is in good spirits.  Pt is afebrile, po fluids encouraged, TCBD encouraged, hand hygiene encouragedabx treatment continues no s/s of adverse reactions will continue to monitor.Pt turns independently, pt is aware of bony area and pressure reduction positions V/S stable, within normal limits.Family at bedside. Pt remains injury and fall free, non skid footwear donned, call light within reach, personal items within reach, bed in low/locked position, pt able to voice needs all needs voiced have been met at this time.

## 2020-10-12 NOTE — PLAN OF CARE
LOVELY spoke with pt and spouse regarding SNF placement. LOVELY sent referrals to [facility] and [facility].     LOVELY called in LOCET to [facility]. Awaiting 142.     4:22 PM  142 in [facility].     10/12/20 1358   Post-Acute Status   Post-Acute Authorization Placement   Post-Acute Placement Status Referrals Sent     Samantha Quinteros LMSW  [facility]

## 2020-10-12 NOTE — PT/OT/SLP PROGRESS
"Occupational Therapy   Treatment    Name: Roshan Rankin  MRN: [id number]  Admitting Diagnosis:  Convulsion       Recommendations:     Discharge Recommendations: nursing facility, skilled  Discharge Equipment Recommendations:  walker, rolling  Barriers to discharge:  Other (Comment)(increased assistance at current level of function)    Assessment:     Roshan Rankin is a 75 y.o. male with a medical diagnosis of Convulsion.  Co-treat with OT due to pt's increased level of skilled assistance required and due to his decreased activity tolerance.  Pt was limited in performing functional transfers and mobility due to significant R knee pain with movement.  Pt with good motivation and effort; however, he would be unable to tolerate 3 hours of therapy per day at this time due to pain.  Therefore, skilled nursing facility recommended at d/c for maximal pt gains in functional independence and for pt safety upon returning home.  He presents with the following deficits. Performance deficits affecting function are weakness, impaired endurance, impaired self care skills, impaired functional mobilty, gait instability, impaired balance, decreased lower extremity function, pain, edema, decreased ROM.     Rehab Prognosis:  Good; patient would benefit from acute skilled OT services to address these deficits and reach maximum level of function.       Plan:     Patient to be seen 4 x/week to address the above listed problems via self-care/home management, therapeutic activities, therapeutic exercises  · Plan of Care Expires: 11/09/20  · Plan of Care Reviewed with: patient, spouse    Subjective   Pt's wife stated, "The doctors said the R knee pain is due to pseudogout.  They drained his knee yesterday."   Pain/Comfort:  · Pain Rating 1: other (see comments)(at rest)  · Location - Side 1: Right  · Location 1: knee  · Pain Addressed 1: Pre-medicate for activity, Reposition, Distraction, Cessation of Activity  · Pain Rating Post-Intervention " 1: 9/10(with movement)    Objective:     Communicated with: RN and PT prior to session.  Patient found HOB elevated with peripheral IV, telemetry, pulse ox (continuous), blood pressure cuff with his wife present upon OT entry to room.    General Precautions: Standard, aspiration, fall, seizure   Orthopedic Precautions:N/A   Braces: N/A     Occupational Performance:     Bed Mobility:    · Patient completed Rolling/Turning to Left with  moderate assistance  · Patient completed Scooting/Bridging with moderate assistance  · Patient completed Supine to Sit with moderate assistance     Functional Mobility/Transfers:  · Patient completed Sit <> Stand Transfer from EOB with maximal assistance x 2 persons with  rolling walker   · Patient completed Bed <> Chair Transfer using Step Transfer technique with maximal assistance and of 2 persons with rolling walker  · Functional Mobility: Pt took ~3 steps from bed to chair with Max A x 2 people with RW.  Pt with flexed posture throughout transfer and had difficulty weight-shifting due to significant R knee pain.     Activities of Daily Living:  · Feeding:  setup of pt's lunch on tray table for pt to eat while [facility]IC.  Pt stated he is L-handed and was having difficulty using his utensils due to his pulse ox probe.  Therapist removed it for his meal and notified RN.   · Grooming: setup assistance of tray table with a wash cloth, tooth brush and tooth paste, cups for water, and a spit basin for pt to brush his teeth while [facility]IC after finishing his lunch  · Lower Body Dressing: total assistance to abdon B socks while supine      AMPAC 6 Click ADL: 16    Treatment & Education:  - Pt and his wife edu on role of OT, POC, benefit of performing OOB activity, and safety when performing functional transfers and mobility.  - White board updated  - Self care tasks completed-- as noted above       Patient left up in chair with all lines intact, call button in reach, RN notified and RN and his wife  presentEducation:      GOALS:   Multidisciplinary Problems     Occupational Therapy Goals        Problem: Occupational Therapy Goal    Goal Priority Disciplines Outcome Interventions   Occupational Therapy Goal     OT, PT/OT Ongoing, Progressing    Description: Goals to be met by: 11-9-20    Patient will increase functional independence with ADLs by performing:    UE Dressing with Modified [place].  LE Dressing with Supervision.  Grooming while standing at sink with Supervision.  Toileting from toilet with Supervision for hygiene and clothing management.   Toilet transfer to toilet with Supervision.                     Time Tracking:     OT Date of Treatment: 10/12/20  OT Start Time: 1109  OT Stop Time: 1135  OT Total Time (min): 26 min    Billable Minutes:Self Care/Home Management 13 min.  Therapeutic Activity 13 min.    Wesley Simpson, OT  10/12/2020

## 2020-10-12 NOTE — PLAN OF CARE
Problem: Occupational Therapy Goal  Goal: Occupational Therapy Goal  Description: Goals to be met by: 11-9-20    Patient will increase functional independence with ADLs by performing:    UE Dressing with Modified [place].  LE Dressing with Supervision.  Grooming while standing at sink with Supervision.  Toileting from toilet with Supervision for hygiene and clothing management.   Toilet transfer to toilet with Supervision.    Outcome: Ongoing, Progressing     OT goals remain appropriate.    Wesley Simpson, OT   10/12/2020

## 2020-10-12 NOTE — PT/OT/SLP PROGRESS
Physical Therapy Treatment    Patient Name:  Roshan Rankin   MRN:  [id number]    Recommendations:     Discharge Recommendations:  nursing facility, skilled   Discharge Equipment Recommendations: walker, rolling   Barriers to discharge: Decreased caregiver support and decreased level of functional mobility.     Assessment:     Roshan Rankin is a 75 y.o. male admitted with a medical diagnosis of Convulsion.  He presents with the following impairments/functional limitations:  weakness, impaired endurance, impaired self care skills, impaired functional mobilty, gait instability, impaired balance, decreased lower extremity function, pain, decreased ROM, edema Co treat with OT due to patient's increased level of skilled assistance required and decreased tolerance to activity. Patient demonstrated improvements in mobility and activity tolerance although he remains primarily limited by severe R knee pain with any movement or weightbearing. Despite this, patient with excellent motivation and effort. ModA bed mobility. MaxA sit to stand transfers with RW and ~3 steps to chair. Patient is not safe to return home when medically ready at current level of mobility and would benefit from skilled nursing facility to improve functional mobility and safety. Patient can transfer bed-chair with nursing x 2p assist, use of rolling walker.     Rehab Prognosis: Good; patient would benefit from acute skilled PT services to address these deficits and reach maximum level of function.    Recent Surgery: * No surgery found *      Plan:     During this hospitalization, patient to be seen 4 x/week to address the identified rehab impairments via gait training, therapeutic activities, therapeutic exercises, neuromuscular re-education and progress toward the following goals:    · Plan of Care Expires:  11/08/20    Subjective     Chief Complaint: R knee pain  Patient/Family Comments/goals: patient and wife agreeable to post acute placement for  additional therapy prior to returning home.   Pain/Comfort:  · Pain Rating 1: 0/10(at rest)  · Location - Side 1: Right  · Location 1: knee  · Pain Addressed 1: Pre-medicate for activity, Reposition, Distraction, Cessation of Activity  · Pain Rating Post-Intervention 1: 9/10(with movement)      Objective:     Communicated with nurse prior to session.  Patient found HOB elevated with pulse ox (continuous), telemetry upon PT entry to room.     General Precautions: Standard, fall, seizure   Orthopedic Precautions:N/A   Braces: N/A     Functional Mobility:  · Bed Mobility:     · Scooting: moderate assistance  · Supine to Sit: moderate assistance  · Transfers:     · Sit to Stand:  maximal assistance with rolling walker, v/t cues for foot placement to decrease force through painful LE and for safe hand placement.  · Bed to Chair: maximal assistance of 2 persons with  rolling walker  using  Step Transfer, v/t cues for walker management, support at trunk and hips  · Stand to Sit: maximal assistance with rolling walker, v/t cues for foot placement to decrease force through painful LE and for safe hand placement.  · Gait: ~3 steps to chair with RW, MaxA or 2 persons. Decreased ability to weightshift due to severe R knee pain, decreased step length, increased use of UE on RW, flexed posture, severely antalgic gait. V/t cues for walker management, to bring walker closer to person, for weight shifting and foot placement.   · Balance: Sitting: Good, SBA. Standing: Poor, requires support form RW and therapist      AM-PAC 6 CLICK MOBILITY  Turning over in bed (including adjusting bedclothes, sheets and blankets)?: 3  Sitting down on and standing up from a chair with arms (e.g., wheelchair, bedside commode, etc.): 2  Moving from lying on back to sitting on the side of the bed?: 2  Moving to and from a bed to a chair (including a wheelchair)?: 2  Need to walk in hospital room?: 2  Climbing 3-5 steps with a railing?: 1  Basic Mobility  Total Score: 12       Therapeutic Activities and Exercises:  Pt educated on plan and goals with physical therapy.   Pt educated on importance of OOB activity to decrease the risks associated with bed rest.   LAQ with in pain free ROM 10x, instructed to complete independently 3x/day to decrease pain and improve joint mobility and strength.   Discussed discharge planning and change in recommendations for post acute placement.   Instruction provided for safety and technique for gait and transfers with RW.    Educated patient on indication for need for RW during mobility.   Recommended patient use RW for improved gait stability, gait quality, and safety   All questions and concerns addressed within PT scope of practice.   Instructed to call nursing for assistance with out of bed mobility.       Patient left up in chair with all lines intact, call button in reach, nurse notified and wife present..    GOALS:   Multidisciplinary Problems     Physical Therapy Goals        Problem: Physical Therapy Goal    Goal Priority Disciplines Outcome Goal Variances Interventions   Physical Therapy Goal     PT, PT/OT Ongoing, Progressing     Description: Goals to be met by: 10/26/2020     Patient will increase functional independence with mobility by performin. Supine to sit with Contact Guard Assistance  2. Sit to supine with Contact Guard Assistance  3. Sit to stand transfer with Minimal Assistance  4. Bed to chair transfer with Minimal Assistance using LRAD.  5. Gait  x 25 feet with Minimal Assistance using LRAD.    6. Lower extremity exercise program x30 reps per handout, with independence                     Time Tracking:     PT Received On: 10/12/20  PT Start Time: 1109     PT Stop Time: 1134  PT Total Time (min): 25 min     Billable Minutes: Gait Training 11 and Therapeutic Activity 14    Treatment Type: Treatment  PT/PTA: PT     PTA Visit Number: 0     Talia Thomson PT  10/12/2020

## 2020-10-13 LAB
ANION GAP SERPL CALC-SCNC: 12 MMOL/L (ref 8–16)
BASOPHILS # BLD AUTO: 0.06 K/UL (ref 0–0.2)
BASOPHILS NFR BLD: 0.7 % (ref 0–1.9)
BUN SERPL-MCNC: 18 MG/DL (ref 8–23)
CALCIUM SERPL-MCNC: 8.1 MG/DL (ref 8.7–10.5)
CHLORIDE SERPL-SCNC: 102 MMOL/L (ref 95–110)
CO2 SERPL-SCNC: 23 MMOL/L (ref 23–29)
CREAT SERPL-MCNC: 0.9 MG/DL (ref 0.5–1.4)
CREAT SERPL-MCNC: 0.9 MG/DL (ref 0.5–1.4)
DIFFERENTIAL METHOD: ABNORMAL
EOSINOPHIL # BLD AUTO: 0.4 K/UL (ref 0–0.5)
EOSINOPHIL NFR BLD: 4.6 % (ref 0–8)
ERYTHROCYTE [DISTWIDTH] IN BLOOD BY AUTOMATED COUNT: 12.3 % (ref 11.5–14.5)
EST. GFR  (AFRICAN AMERICAN): >60 ML/MIN/1.73 M^2
EST. GFR  (AFRICAN AMERICAN): >60 ML/MIN/1.73 M^2
EST. GFR  (NON AFRICAN AMERICAN): >60 ML/MIN/1.73 M^2
EST. GFR  (NON AFRICAN AMERICAN): >60 ML/MIN/1.73 M^2
GLUCOSE SERPL-MCNC: 104 MG/DL (ref 70–110)
HCT VFR BLD AUTO: 37.6 % (ref 40–54)
HGB BLD-MCNC: 12.4 G/DL (ref 14–18)
IMM GRANULOCYTES # BLD AUTO: 0.04 K/UL (ref 0–0.04)
IMM GRANULOCYTES NFR BLD AUTO: 0.4 % (ref 0–0.5)
LYMPHOCYTES # BLD AUTO: 1.3 K/UL (ref 1–4.8)
LYMPHOCYTES NFR BLD: 14 % (ref 18–48)
MAGNESIUM SERPL-MCNC: 2 MG/DL (ref 1.6–2.6)
MCH RBC QN AUTO: 32.3 PG (ref 27–31)
MCHC RBC AUTO-ENTMCNC: 33 G/DL (ref 32–36)
MCV RBC AUTO: 98 FL (ref 82–98)
MONOCYTES # BLD AUTO: 1.1 K/UL (ref 0.3–1)
MONOCYTES NFR BLD: 12 % (ref 4–15)
NEUTROPHILS # BLD AUTO: 6.1 K/UL (ref 1.8–7.7)
NEUTROPHILS NFR BLD: 68.3 % (ref 38–73)
NRBC BLD-RTO: 0 /100 WBC
PHOSPHATE SERPL-MCNC: 3.7 MG/DL (ref 2.7–4.5)
PLATELET # BLD AUTO: 271 K/UL (ref 150–350)
PMV BLD AUTO: 10.7 FL (ref 9.2–12.9)
POTASSIUM SERPL-SCNC: 3.4 MMOL/L (ref 3.5–5.1)
RBC # BLD AUTO: 3.84 M/UL (ref 4.6–6.2)
SODIUM SERPL-SCNC: 137 MMOL/L (ref 136–145)
WBC # BLD AUTO: 8.92 K/UL (ref 3.9–12.7)

## 2020-10-13 PROCEDURE — 25000003 PHARM REV CODE 250: Performed by: NURSE PRACTITIONER

## 2020-10-13 PROCEDURE — 99232 PR SUBSEQUENT HOSPITAL CARE,LEVL II: ICD-10-PCS | Mod: ,,, | Performed by: PHYSICIAN ASSISTANT

## 2020-10-13 PROCEDURE — 36415 COLL VENOUS BLD VENIPUNCTURE: CPT

## 2020-10-13 PROCEDURE — 25000003 PHARM REV CODE 250: Performed by: PHYSICIAN ASSISTANT

## 2020-10-13 PROCEDURE — 80048 BASIC METABOLIC PNL TOTAL CA: CPT

## 2020-10-13 PROCEDURE — 99232 SBSQ HOSP IP/OBS MODERATE 35: CPT | Mod: ,,, | Performed by: PHYSICIAN ASSISTANT

## 2020-10-13 PROCEDURE — 83735 ASSAY OF MAGNESIUM: CPT

## 2020-10-13 PROCEDURE — 85025 COMPLETE CBC W/AUTO DIFF WBC: CPT

## 2020-10-13 PROCEDURE — 63600175 PHARM REV CODE 636 W HCPCS: Performed by: NURSE PRACTITIONER

## 2020-10-13 PROCEDURE — 25000003 PHARM REV CODE 250: Performed by: HOSPITALIST

## 2020-10-13 PROCEDURE — 84100 ASSAY OF PHOSPHORUS: CPT

## 2020-10-13 PROCEDURE — 11000001 HC ACUTE MED/SURG PRIVATE ROOM

## 2020-10-13 RX ORDER — LIDOCAINE 50 MG/G
2 PATCH TOPICAL
Status: COMPLETED | OUTPATIENT
Start: 2020-10-13 | End: 2020-10-14

## 2020-10-13 RX ADMIN — FUROSEMIDE 40 MG: 40 TABLET ORAL at 08:10

## 2020-10-13 RX ADMIN — SODIUM CHLORIDE: 0.9 INJECTION, SOLUTION INTRAVENOUS at 06:10

## 2020-10-13 RX ADMIN — DOXYCYCLINE HYCLATE 100 MG: 100 TABLET, COATED ORAL at 08:10

## 2020-10-13 RX ADMIN — NAPROXEN 250 MG: 250 TABLET ORAL at 04:10

## 2020-10-13 RX ADMIN — THERA TABS 1 TABLET: TAB at 08:10

## 2020-10-13 RX ADMIN — HEPARIN SODIUM 5000 UNITS: 5000 INJECTION INTRAVENOUS; SUBCUTANEOUS at 09:10

## 2020-10-13 RX ADMIN — HEPARIN SODIUM 5000 UNITS: 5000 INJECTION INTRAVENOUS; SUBCUTANEOUS at 06:10

## 2020-10-13 RX ADMIN — METOPROLOL TARTRATE 100 MG: 100 TABLET ORAL at 08:10

## 2020-10-13 RX ADMIN — METOPROLOL TARTRATE 100 MG: 100 TABLET ORAL at 09:10

## 2020-10-13 RX ADMIN — DOXYCYCLINE HYCLATE 100 MG: 100 TABLET, COATED ORAL at 09:10

## 2020-10-13 RX ADMIN — HYDROCHLOROTHIAZIDE 12.5 MG: 12.5 TABLET ORAL at 08:10

## 2020-10-13 RX ADMIN — FOLIC ACID 1 MG: 1 TABLET ORAL at 08:10

## 2020-10-13 RX ADMIN — AMLODIPINE BESYLATE 10 MG: 10 TABLET ORAL at 08:10

## 2020-10-13 RX ADMIN — CEFADROXIL 1 G: 1 TABLET, FILM COATED ORAL at 08:10

## 2020-10-13 RX ADMIN — LIDOCAINE 2 PATCH: 50 PATCH TOPICAL at 02:10

## 2020-10-13 RX ADMIN — DIAZEPAM 5 MG: 5 TABLET ORAL at 09:10

## 2020-10-13 RX ADMIN — CEFADROXIL 1 G: 1 TABLET, FILM COATED ORAL at 09:10

## 2020-10-13 RX ADMIN — Medication 100 MG: at 08:10

## 2020-10-13 RX ADMIN — NAPROXEN 250 MG: 250 TABLET ORAL at 08:10

## 2020-10-13 RX ADMIN — PANTOPRAZOLE SODIUM 40 MG: 40 TABLET, DELAYED RELEASE ORAL at 08:10

## 2020-10-13 RX ADMIN — COLCHICINE 0.6 MG: 0.6 TABLET, FILM COATED ORAL at 08:10

## 2020-10-13 RX ADMIN — ASPIRIN 81 MG: 81 TABLET, COATED ORAL at 08:10

## 2020-10-13 RX ADMIN — HEPARIN SODIUM 5000 UNITS: 5000 INJECTION INTRAVENOUS; SUBCUTANEOUS at 01:10

## 2020-10-13 NOTE — PLAN OF CARE
Pt cont on NS @125/hr. Family @ bedside during shift. Pt did chair time for a few hours. Pt remains free from injury/falls for shift. Educated Pt on meds no questions at this time. VSS.  No complaints of CP, SOB, pain/discomfort  Bed locked in lowest position, call bell within reach. All questions addressed.  Will continue to monitor.

## 2020-10-13 NOTE — ASSESSMENT & PLAN NOTE
76 yo male admitted with Sz likely secondary to hyponatremia and ETOH w/drawl found to have cellulitis LLE and leukocytosis.  On exam there is concern for R knee infection.  - on vanc and zosyn  - leukocytosis resolved - poss secondary to seizure/cellulitis  - stable non septic  - Knee aspiration shows CaPyrophosphate crystals bl - pseudogout  - LLE cellulitis much improved.    Plan:  1. Home on Doxycycline 100mg po bid and cefadroxil 1g po bid x 8d (to complete 14d total of abx for LLE cellulitis)  2. Knees likely with gout only but will fu cultures tomorrow and no growth - home on po abx  3.Will sign off  4. FU in ID clinic in 7d  5.  The patient was encouraged to call the office for further concerns or complaints.  Business card provided.

## 2020-10-13 NOTE — SUBJECTIVE & OBJECTIVE
Interval History: No AEON. Afebrile and WBC WNL.  Ortho aspirated bl knees and both with Ca pyrophosphate crystals.  The patient denies any recent fever, chills, or sweats.      Review of Systems   Constitutional: Positive for chills. Negative for diaphoresis and fever.   Respiratory: Negative for shortness of breath.    Cardiovascular: Negative for chest pain.   Gastrointestinal: Negative for abdominal pain, diarrhea, nausea and vomiting.   Genitourinary: Negative for dysuria and hematuria.   Musculoskeletal: Positive for arthralgias.   Skin: Positive for color change and rash.     Objective:     Vital Signs (Most Recent):  Temp: 97.6 °F (36.4 °C) (10/12/20 1600)  Pulse: 82 (10/12/20 1600)  Resp: (!) 21 (10/12/20 1600)  BP: 117/73 (10/12/20 1600)  SpO2: 98 % (10/12/20 1600) Vital Signs (24h Range):  Temp:  [97.6 °F (36.4 °C)-98.1 °F (36.7 °C)] 97.6 °F (36.4 °C)  Pulse:  [67-85] 82  Resp:  [18-26] 21  SpO2:  [94 %-98 %] 98 %  BP: (112-134)/(60-96) 117/73     Weight: 102.1 kg (225 lb)  Body mass index is 32.28 kg/m².    Estimated Creatinine Clearance: 84.9 mL/min (based on SCr of 0.9 mg/dL).    Physical Exam  Constitutional:       General: He is not in acute distress.     Appearance: He is well-developed. He is not diaphoretic.       HENT:      Head: Normocephalic and atraumatic.   Cardiovascular:      Rate and Rhythm: Normal rate and regular rhythm.      Heart sounds: Normal heart sounds. No murmur. No friction rub. No gallop.    Pulmonary:      Effort: Pulmonary effort is normal. No respiratory distress.      Breath sounds: Normal breath sounds. No wheezing or rales.   Abdominal:      General: Bowel sounds are normal. There is no distension.      Palpations: Abdomen is soft. There is no mass.      Tenderness: There is no abdominal tenderness. There is no guarding or rebound.   Skin:     General: Skin is warm and dry.   Neurological:      Mental Status: He is alert and oriented to person, place, and time.    Psychiatric:         Behavior: Behavior normal.                 Significant Labs:   Blood Culture:   Recent Labs   Lab 10/07/20  1110   LABBLOO No growth after 5 days.  No growth after 5 days.     CBC:   Recent Labs   Lab 10/11/20  0250 10/12/20  0847   WBC 9.10 7.89   HGB 12.8* 12.5*   HCT 37.7* 37.8*    263     CMP:   Recent Labs   Lab 10/11/20  0250 10/12/20  1248   *  --    K 3.2*  --    CL 97  --    CO2 23  --    *  --    BUN 11  --    CREATININE 0.8 0.9   CALCIUM 7.9*  --    PROT 6.4  --    ALBUMIN 2.3*  --    BILITOT 1.2*  --    ALKPHOS 68  --    AST 45*  --    ALT 32  --    ANIONGAP 12  --    EGFRNONAA >60.0 >60.0     Wound Culture:   Recent Labs   Lab 10/11/20  0858   LABAERO No growth  No growth     All pertinent labs within the past 24 hours have been reviewed.    Significant Imaging: I have reviewed all pertinent imaging results/findings within the past 24 hours.    Component Value Units Date/Time   X-Ray Knee 1 or 2 View Bilateral [[id number]] Resulted: 10/11/20 0923   Order Status: Completed Updated: 10/11/20 0925   Narrative:     EXAMINATION:   XR KNEE 1 OR 2 VIEW BILATERAL     CLINICAL HISTORY:   infection.  AP/ Lat;     TECHNIQUE:   Bilateral knee radiograph two views.     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     Left: There is moderate medial femorotibial and mild lateral femorotibial DJD with marginal osteophyte formation.  Minimal enthesopathic change at the patellar quadriceps insertion.     No definite fracture, dislocation, or osseous destruction in either knee.  Scattered vascular atherosclerosis.    Impression:       As above.       Electronically signed by: Agus Bernardo   Date: 10/11/2020   Time: 09:23   X-Ray Knee AP Standing Bilateral [[id number]] Resulted: 10/11/20 0710   Order Status: Completed  Updated: 10/11/20 0713   Narrative:     EXAMINATION:   XR KNEE AP STANDING BILATERAL     CLINICAL HISTORY:   knee pain;     TECHNIQUE:   Difficulty getting complete exam given patient condition.  Two AP projection images of the knees were submitted.     COMPARISON:   None available     FINDINGS:   Right: There is severe femorotibial joint space narrowing with DJD and osteophyte formation, most pronounced at the lateral compartment.     Left: There is moderately advanced DJD with joint space narrowing of the medial femorotibial compartment.     No definite fracture.  Scattered vascular atherosclerosis.    Impression:       As above.       Electronically signed by: Agus Bernardo   Date: 10/11/2020   Time: 07:10   MRI Brain Epilepsy W W/O Contrast [[id number]] (Abnormal) Resulted: 10/07/20 2132   Order Status: Completed Updated: 10/07/20 2135   Narrative:     EXAMINATION:   MRI BRAIN EPILEPSY W W/O CONTRAST     CLINICAL HISTORY:   seizures;     TECHNIQUE:   Sagittal 3D space FLAIR and sagittal 3D  T1 MP rage which were reformatted in the coronal and sagittal planes.  Axial T2, axial gradient, axial T1 and axial diffusion imaging of the whole brain without contrast.  In addition coronal thin section high-resolution T2 imaging in oblique plane was performed without contrast.  Following contrast administration axial T1 and sagittal T1 3D spoiled gradient which was reformatted in the coronal and axial planes were performed.  10 ML Gadavist intravenous contrast.     COMPARISON:   CT 10/06/2020     FINDINGS:   Generalized cerebral volume loss with compensatory enlargement ventricles sulci and cisterns without hydrocephalus.  Focal T2 FLAIR signal hyperintensity within the right thalamus corresponding to hypodensity seen on CT without diffusion abnormality or enhancement compatible with remote lacunar-type infarction.  There is slight motion distortion of the examination.  No abnormal parenchymal susceptibility to suggest  parenchymal hemorrhage allowing for motion limitation.     There is no abnormal parenchymal enhancement.  There is numerous scattered patchy and confluent foci of T2 FLAIR signal hyperintensity supratentorial white matter while nonspecific concerning for moderate degree of chronic microvascular ischemic change.     Question slight asymmetrical volume loss right mesial temporal lobe compared the left although no significant asymmetrical signal abnormality cannot exclude right mesial temporal sclerosis in light of history of seizure activity.  Clinical correlation correlation with EEG findings advised.     This report was flagged in Epic as abnormal.    Impression:       Generalized cerebral volume loss slightly advanced for age with patchy and confluent regions of T2 FLAIR signal abnormality supratentorial white matter while nonspecific concerning for moderate degree of chronic microvascular ischemic change.     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     No evidence for acute infarction or intracranial enhancing mass lesion.     There is slight asymmetrical volume loss in the mesial temporal lobes right being slightly greater than left although no significant asymmetrical signal abnormality can not exclude right mesial temporal sclerosis.  Clinical correlation and correlation with EEG advised.       Electronically signed by: Evert Priest DO   Date: 10/07/2020   Time: 21:32   X-Ray Chest 1 View [[id number]] Resulted: 10/07/20 1243   Order Status: Completed Updated: 10/07/20 1245   Narrative:     EXAMINATION:   XR CHEST 1 VIEW     CLINICAL HISTORY:   Leukocytosis;     FINDINGS:   Heart size is normal.  Lungs are clear and the bones show nothing unusual.    Impression:       No acute process seen.       Electronically signed by: Reno Gallardo MD   Date: 10/07/2020   Time: 12:43   CT Head Without Contrast [[id number]] Resulted: 10/06/20 2253   Order Status: Completed Updated: 10/06/20 2256    Narrative:     EXAMINATION:   CT HEAD WITHOUT CONTRAST     CLINICAL HISTORY:   Altered mental status;     TECHNIQUE:   Low dose axial images were obtained through the head.  Coronal and sagittal reformations were also performed. Contrast was not administered.     COMPARISON:   None.     FINDINGS:   The ventricular system, sulcal pattern and parenchymal attenuation characteristics are consistent with chronic change.  Involutional changes noted, chronic appearing white matter change is noted.  There is no evidence for intracranial mass, mass effect or midline shift and there is no evidence for acute intracranial hemorrhage.  Appropriate CSF spaces are seen at the skull base.     The visualized orbits appear intact.  The mastoid air cells appear appropriate when accounting for averaging, mild opacity along the external auditory canal on the left may relate to cerumen.  Paranasal sinuses appear appropriate when accounting for averaging.    Impression:       Chronic intracranial changes are noted, there is no evidence for superimposed acute intracranial process.       Electronically signed by: Shai Car   Date: 10/06/2020   Time: 22:53   Imaging History    2020  Date Procedure Name Status Accession Number Location   10/11/20 09:14 AM X-Ray Knee 1 or 2 View Bilateral Final [id number] [facility]   10/10/20 06:07 PM X-Ray Knee AP Standing Bilateral Final [id number] [facility]   10/07/20 08:21 PM MRI Brain Epilepsy W W/O Contrast Final [id number] [facility]   10/07/20 12:25 PM X-Ray Chest 1 View Final [id number] [facility]   10/06/20 10:37 PM CT Head Without Contrast Final [id number] [facility]   10/07/20 01:13 PM Echo Color Flow Doppler? Yes Final [id number] [facility]

## 2020-10-13 NOTE — ASSESSMENT & PLAN NOTE
Leukocytosis  - redness, warmth and tenderness noted to LLE on admission; reported pain since scratching it at home a few days ago  - 1/4 SIRS on admission w/ WBC 14; afebrile & HDS  - started on IV vanc on 10/07  - pt spiked fever on 10/08- Tmax 101. WBC increased from 12.85 to 13.98. Added ceftriaxone for broader coverage. WBC increased further on 10/09, CTX discontinued and started zosyn. ID consulted and recommending discontinuing zosyn and treating with vanc + CTX  - transitioned to PO Doxycycline 100mg po bid and cefadroxil 1g po bid  (will continue at discharge until 10/20/2020 to complete 14d total of abx for LLE cellulitis)  - pt will follow up in ID clinic in approx 1 week

## 2020-10-13 NOTE — SUBJECTIVE & OBJECTIVE
Interval History: No acute events overnight. The patient was seen at the bedside today. He reports some improvement in his knee pain, but continuous to have significant pain with ROM. No further signs or symptoms of withdrawal, valium tapered to 5 mg bid today and will monitor for signs of withdrawal closely. Transitioned to oral abx today. PT/OT recommending SNF, pt and wife agree and placement pending.     Review of Systems   Constitutional: Positive for activity change. Negative for chills and fever.   Respiratory: Negative for chest tightness and shortness of breath.    Cardiovascular: Negative for chest pain and leg swelling.   Gastrointestinal: Negative for abdominal pain and nausea.   Musculoskeletal: Positive for arthralgias and joint swelling. Negative for neck stiffness.   Skin: Positive for color change (improved).   Neurological: Positive for tremors. Negative for dizziness, seizures, syncope, facial asymmetry, speech difficulty, weakness and light-headedness.   Psychiatric/Behavioral: Negative for confusion. The patient is not nervous/anxious.      Objective:     Vital Signs (Most Recent):  Temp: 97.6 °F (36.4 °C) (10/12/20 1600)  Pulse: 82 (10/12/20 1600)  Resp: (!) 21 (10/12/20 1600)  BP: 117/73 (10/12/20 1600)  SpO2: 98 % (10/12/20 1600) Vital Signs (24h Range):  Temp:  [97.6 °F (36.4 °C)-97.8 °F (36.6 °C)] 97.6 °F (36.4 °C)  Pulse:  [67-82] 82  Resp:  [18-26] 21  SpO2:  [94 %-98 %] 98 %  BP: (112-131)/(60-76) 117/73     Weight: 102.1 kg (225 lb)  Body mass index is 32.28 kg/m².    Intake/Output Summary (Last 24 hours) at 10/12/2020 5976  Last data filed at 10/12/2020 1040  Gross per 24 hour   Intake 897.92 ml   Output 1620 ml   Net -722.08 ml      Physical Exam  Vitals signs and nursing note reviewed.   Constitutional:       Appearance: He is well-developed.   Eyes:      General: No scleral icterus.     Extraocular Movements: Extraocular movements intact.   Cardiovascular:      Rate and Rhythm:  Normal rate and regular rhythm.      Pulses: Normal pulses.   Pulmonary:      Effort: Pulmonary effort is normal.      Breath sounds: Normal breath sounds.   Abdominal:      General: Bowel sounds are normal.      Palpations: Abdomen is soft.      Tenderness: There is no abdominal tenderness. There is no guarding or rebound.   Musculoskeletal: Normal range of motion.         General: Swelling present. No tenderness.      Comments: Swelling and erythema noted of the left lower extremity. No warmth or tenderness noted. Improving.    Pain with ROM of knees bilaterally. Warmth of R knee. No bony tenderness, deformity, or swelling noted.   Skin:     General: Skin is warm and dry.      Findings: Erythema present.   Neurological:      General: No focal deficit present.      Mental Status: He is alert and oriented to person, place, and time.      Sensory: No sensory deficit.      Motor: Tremor (L hand at rest) present. No weakness.         Significant Labs: All pertinent labs within the past 24 hours have been reviewed.    Significant Imaging: I have reviewed all pertinent imaging results/findings within the past 24 hours.

## 2020-10-13 NOTE — PROGRESS NOTES
"[facility] ICU 14 [facility] Medicine  Progress Note    Patient Name: Roshan Rankin  MRN: [id number]  Patient Class: IP- Inpatient   Admission Date: 10/6/2020  Length of Stay: 4 days  Attending Physician: Micky Crockett MD  Primary Care Provider: Ben Brannon MD        Subjective:     Principal Problem:Convulsion        HPI:  Roshan Rankin is a 75 y.o. male with a significant medical history of HTN, ETOH abuse (10-12 beers every day) who presents to the hospital complaining of syncopal episode.  HPI information gathered from review of the patient's medical record due to the patient being unaware of preceding events.  The patient's wife was inside the house when she heard a thud in the garage.  She went out and found the patient on his chest w/his eye closed and labored breathing.  She denies tremors or tonic clonic seizure like activity.  No bowel or urinary incontinence was reported.  The patient had blood in his mouth indicating he may have bitten his tongue.  He was not responsive to his wife's questions so she called 911.  The patient was brought to the ED for evaluation.  There was no reported history of seizures.   Shortly after arrival to the ED the patient began to return to baseline.  He did not recall falling or if he had any preceding symptoms.  The patient denied CP, SOB, HA, change in vision, dizziness, or N/V.  ED work up included labs that revealed a WBC 14.71, Na 131.  Other labs grossly normal.  Currently the patient is AA&O x3.  He is c/o right sided chest pain that he describes as "like I pulled a muscle".  He has no other complaints at this time.  Per his nurse, the patient's speed to respond has improved since his arrival in his room.  The patient is admitted to Hospital Medicine.    Overview/Hospital Course:  Mr. Rankin was admitted to hospital medicine service for evaluation of possible syncopal episodes with convulsions. In the ED, he was witnessed to have a generalized " tonic clonic lasting 10-15 seconds with tongue biting. Was given total of 2 g IV Keppra then started on 500 mg BID. CT head WO contrast unremarkable for acute intracranial pathology. Labs with leukocytosis (14) and hyponatremia (131). No previous hx of seizures or similar episodes. Pt and wife did say he drank less alcohol on day of admission (~4 beers all day), but denies recent cut back in alcohol consumption. Neuro consulted, suspect cluster of seizures (3 within 24 hr period) was provoked in the setting of hyponatremia likely due to chronic alcohol abuse. MRI w w/o contrast showed generalized volume loss and moderate degree of chronic microvascular ischemic disease. Small R thalamic lacunar infarct. Slight asymmetrical volume loss in the mesial temporal lobes R slightly greater than L. No acute intracranial pathology. 24 hr vEEG showed mild, generalized, non-specific cerebral dysfunction. Neurology suspect alcohol withdrawal provoked seizure, and no AEDs at this time. Pt treated with valium taper for alcohol withdrawal. Continue seizure precautions    Patient with notable LLE cellulitis on exam, started on IV vanc on 10/07. Suspect cellulitis etiology of leukocytosis. Bcx NGTD. WBC increased on 10/8 and pt spiked fever of 101. Added Ceftriaxone for broader coverage and increased valium for possible alcohol withdrawal sxs.  Transitioned patient from CTX to zosyn due to persistent leukocytosis and consulted ID- recommended resuming previous tx with vanc + CTX. Pt transitioned to oral abx on 10/12, and will be discharged on orals to complete 14 day course (last dose 10/20). Consulted ortho surgery for evaluation of bilateral knee pain to r/o septic arthritis. Joint fluid revealed pseudogout. Pt treated with colchicine and naproxen.    PT/OT recommending SNF, placement pending. Pt will need to follow up in ID clinic 1 week after discharge.    Interval History: No acute events overnight. The patient was seen at the  bedside today. He reports some improvement in his knee pain, but continuous to have significant pain with ROM. No further signs or symptoms of withdrawal, valium tapered to 5 mg bid today and will monitor for signs of withdrawal closely. Transitioned to oral abx today. PT/OT recommending SNF, pt and wife agree and placement pending.     Review of Systems   Constitutional: Positive for activity change. Negative for chills and fever.   Respiratory: Negative for chest tightness and shortness of breath.    Cardiovascular: Negative for chest pain and leg swelling.   Gastrointestinal: Negative for abdominal pain and nausea.   Musculoskeletal: Positive for arthralgias and joint swelling. Negative for neck stiffness.   Skin: Positive for color change (improved).   Neurological: Positive for tremors. Negative for dizziness, seizures, syncope, facial asymmetry, speech difficulty, weakness and light-headedness.   Psychiatric/Behavioral: Negative for confusion. The patient is not nervous/anxious.      Objective:     Vital Signs (Most Recent):  Temp: 97.6 °F (36.4 °C) (10/12/20 1600)  Pulse: 82 (10/12/20 1600)  Resp: (!) 21 (10/12/20 1600)  BP: 117/73 (10/12/20 1600)  SpO2: 98 % (10/12/20 1600) Vital Signs (24h Range):  Temp:  [97.6 °F (36.4 °C)-97.8 °F (36.6 °C)] 97.6 °F (36.4 °C)  Pulse:  [67-82] 82  Resp:  [18-26] 21  SpO2:  [94 %-98 %] 98 %  BP: (112-131)/(60-76) 117/73     Weight: 102.1 kg (225 lb)  Body mass index is 32.28 kg/m².    Intake/Output Summary (Last 24 hours) at 10/12/2020 2229  Last data filed at 10/12/2020 1040  Gross per 24 hour   Intake 897.92 ml   Output 1620 ml   Net -722.08 ml      Physical Exam  Vitals signs and nursing note reviewed.   Constitutional:       Appearance: He is well-developed.   Eyes:      General: No scleral icterus.     Extraocular Movements: Extraocular movements intact.   Cardiovascular:      Rate and Rhythm: Normal rate and regular rhythm.      Pulses: Normal pulses.   Pulmonary:       Effort: Pulmonary effort is normal.      Breath sounds: Normal breath sounds.   Abdominal:      General: Bowel sounds are normal.      Palpations: Abdomen is soft.      Tenderness: There is no abdominal tenderness. There is no guarding or rebound.   Musculoskeletal: Normal range of motion.         General: Swelling present. No tenderness.      Comments: Swelling and erythema noted of the left lower extremity. No warmth or tenderness noted. Improving.    Pain with ROM of knees bilaterally. Warmth of R knee. No bony tenderness, deformity, or swelling noted.   Skin:     General: Skin is warm and dry.      Findings: Erythema present.   Neurological:      General: No focal deficit present.      Mental Status: He is alert and oriented to person, place, and time.      Sensory: No sensory deficit.      Motor: Tremor (L hand at rest) present. No weakness.         Significant Labs: All pertinent labs within the past 24 hours have been reviewed.    Significant Imaging: I have reviewed all pertinent imaging results/findings within the past 24 hours.      Assessment/Plan:      * Convulsion  Syncope    Roshan Rankin is a 75 y.o. male with a significant medical history of HTN, ETOH abuse (10-12 beers every day) who presents to the hospital complaining of syncopal episode.  The patient was found down in his garage by his wife, unresponsive with labored respirations.  He slowly returned to baseline while in the ED. Prior to admission the patient had a witnessed episode of convulsion.  He was loaded with Keppra 1 gm.    - 2D Echo: EF 65% with normal systolic & diastolic function  -  Neurology consulted, suspect cluster of seizures (3 within 24 hr period) was provoked in the setting of hyponatremia likely due to chronic alcohol abuse  - started on Keppra 500 mg BID on admission, held per neurology recs  - MRI brain W WO contrast (epilepsy protocol):   Generalized cerebral volume loss slightly advanced for age with patchy and confluent  regions of T2 FLAIR signal abnormality supratentorial white matter.   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  No evidence for acute infarction or intracranial enhancing mass lesion.  There is slight asymmetrical volume loss in the mesial temporal lobes right being slightly greater than left although no significant asymmetrical signal abnormality can not exclude right mesial temporal sclerosis.    - 24 hr EEG shows mild, generalized, non-specific cerebral dysfunction.  Assessment of activity in the right temporal lobe is significantly limited.   - per neurology, clear to discontinue continuous EEG monitoring at this time.   - No AEDs recommended at this time  - CIWA protocol as noted below  - Seizure precautions  - aspiration precautions  - neuro checks q4h    Pseudogout involving multiple joints  Knee pain, bilateral  Pt complaining of bilateral knee pain. He reports that the pain is chronic due to bilateral arthritis and is an 8/10 at home on a normal basis. He had 2 surgeries on his L knee many years ago after a football injury. He walks with a limp at baseline due to the L knee pain. However, pain has increased during hospitalization.    - Physical exam remarkable for pain with ROM bilaterally and warmth to R knee. No bony tenderness to palpation, no joint swelling or erythema noted, 5/5 strength bilaterally- although assessment limited 2/2 pain  - PT/OT consulted- recommending rehab  - ID concern for R knee infection, ortho surgery consulted for septic arthritis rule out  - X-ray knees bilaterally:   R Knee: Severe OA with significant erosion of joint space in medial, lateral, and patellofemoral compartments.  Significant osteophytosis. No signs of fracture or dislocation.  Calcific artery disease of popliteal vessel noted.    L Knee: Evidence of osteoarthritis with medial and lateral joint space narrowing with osteophytosis.  Medial compartment more affected than latera.  No evidence of  "fracture or dislocation. Calcific artery disease of popliteal vessel noted.   - Arthrocentesis performed of bilateral knees. Both knees had cell count <50k and were positive for pseudogout.  - Uric acid WNL  - no concern at this time for septic arthritis. no orthopedic follow up necessary  - Start colchicine 1.2 mg once then 0.6 mg daily  - Naproxen 250 bid  - lidocaine patch prn    Cellulitis of left lower extremity  Leukocytosis  - redness, warmth and tenderness noted to LLE on admission; reported pain since scratching it at home a few days ago  - 1/4 SIRS on admission w/ WBC 14; afebrile & HDS  - started on IV vanc on 10/07  - pt spiked fever on 10/08- Tmax 101. WBC increased from 12.85 to 13.98. Added ceftriaxone for broader coverage. WBC increased further on 10/09, CTX discontinued and started zosyn. ID consulted and recommending discontinuing zosyn and treating with vanc + CTX  - transitioned to PO Doxycycline 100mg po bid and cefadroxil 1g po bid  (will continue at discharge until 10/20/2020 to complete 14d total of abx for LLE cellulitis)  - pt will follow up in ID clinic in approx 1 week    Other chest pain  -Patient c/o right sided chest pain that he describes as "like I pulled a muscle".  Not reproducible on exam.  -Troponin <0.006  - BNP 97  - EKG: no acute changes  - resolved    Hyponatremia  -Na 131 on admission.  - cc/hr  - improved to 133, continue to monitor with daily labs    Alcohol abuse  -Patient known to drink 10-12 beers/day.  Last ETOH just prior to syncopal episode, 4 cans on day of admission  -Monitor for s/s withdrawal  -CIWA Q8h  -Primary team to be notified if CIWA>10  -Thiamine, Folate, MVI daily  - PETH pending  - started on diazepam 5mg TID for withdrawal ppx, increased to 10 mg tid on 10/8 in setting of fever and increased confusion/agitation. No further signs or symptoms of withdrawal, will continue to taper.     Essential hypertension  -Continue home Norvasc, HCTZ, " metoprolol, and lasix  -Continuing home meds may compensate for s/s of ETOH withdrawals.  Other s/s such as tremors, diaphoresis, hallucinations, etc may be more reliable indicators.      VTE Risk Mitigation (From admission, onward)         Ordered     heparin (porcine) injection 5,000 Units  Every 8 hours      10/07/20 0109     IP VTE HIGH RISK PATIENT  Once      10/07/20 0109     Place sequential compression device  Until discontinued      10/07/20 0109                Discharge Planning   SHANE: 10/12/2020     Code Status: Full Code   Is the patient medically ready for discharge?: No    Reason for patient still in hospital (select all that apply): Patient trending condition, Treatment, Consult recommendations and Pending disposition  Discharge Plan A: Home, Home with family   Discharge Delays: None known at this time            Discussed with staff, Keira Velasco PA-C  Department of Hospital Medicine   [facility]

## 2020-10-13 NOTE — PROGRESS NOTES
Therapy with Vancomycin complete and/or consult discontinued by provider.  Pharmacy will sign off, please re-consult as needed.     Thank you for the consult,   Holly Oconnell  [phone]

## 2020-10-13 NOTE — ASSESSMENT & PLAN NOTE
-Patient known to drink 10-12 beers/day.  Last ETOH just prior to syncopal episode, 4 cans on day of admission  -Monitor for s/s withdrawal  -CIWA Q8h  -Primary team to be notified if CIWA>10  -Thiamine, Folate, MVI daily  - PETH pending  - started on diazepam 5mg TID for withdrawal ppx, increased to 10 mg tid on 10/8 in setting of fever and increased confusion/agitation. No further signs or symptoms of withdrawal, will continue to taper.

## 2020-10-13 NOTE — PLAN OF CARE
10/13/20 0937   Post-Acute Status   Post-Acute Authorization Placement   Post-Acute Placement Status Pending Post-Acute Clinical Review   Discharge Delays None known at this time   Discharge Plan   Discharge Plan A Skilled Nursing Facility     SW informed pt is medically ready for d/c to SNF. Ref pending with [facility] and [facility]. LOVELY sent message to [facility] to f/u on acceptance.    As of 4pm-[facility] still had not made a determination. LOVELY called [facility]-admissions unavailable.    Sheila Hidalgo, QUYNH [phone]

## 2020-10-13 NOTE — PROGRESS NOTES
[facility] - ICU 14 WT  Infectious Disease  Progress Note    Patient Name: Roshan Rankin  MRN: [id number]  Admission Date: 10/6/2020  Length of Stay: 4 days  Attending Physician: Micky Crockett MD  Primary Care Provider: Ben Brannon MD    Isolation Status: No active isolations  Assessment/Plan:      Cellulitis of left lower extremity  76 yo male admitted with Sz likely secondary to hyponatremia and ETOH w/drawl found to have cellulitis LLE and leukocytosis.  On exam there is concern for R knee infection.  - on vanc and zosyn  - leukocytosis resolved - poss secondary to seizure/cellulitis  - stable non septic  - Knee aspiration shows CaPyrophosphate crystals bl - pseudogout  - LLE cellulitis much improved.    Plan:  1. Home on Doxycycline 100mg po bid and cefadroxil 1g po bid x 8d (to complete 14d total of abx for LLE cellulitis)  2. Knees likely with gout only but will fu cultures tomorrow and no growth - home on po abx  3.Will sign off  4. FU in ID clinic in 7d  5.  The patient was encouraged to call the office for further concerns or complaints.  Business card provided.           Anticipated Disposition: tbd    Thank you for your consult. I will sign off. Please contact us if you have any additional questions.    CARLOS Mack  Infectious Disease  [facility] - ICU 14 WT    Subjective:     Principal Problem:Convulsion    HPI: 75 y.o. male with HTN and alcohol abuse (10-12 beers daily) presented to ED 10/6/20 after 2 LOC episodes at home. Wife reports first episode occurred around 10 am. Pt was found lying on his side on the floor in the garage making abnormally loud respirations. Pt thought he had fainted. Bay tired afterwards, ate lunch and rested for a few hours. Later that evening, wife was inside and heard a thud in the garage. Once again he was found lying on the ground with abnormally loud respirations and this time blood coming out of his mouth. Wife called EMS and in  the ED, he was witnessed to have a 3rd episode of tonic-clonic movements and tongue biting. He was given 1 g of Keppra x 2 then started on 500 mg BID. Labs remarkable for leukocytosis (14.71>>12.85) and hyponatremia (131). CT head WO contrast unremarkable for acute intracranial pathology. Placed on CIWA protocol and started on folic acid and thiamine given alcohol intake history. Wife says he had an abnormally small amount of alcohol yesterday, probably ~4 beers, but otherwise has not cut back on drinking lately. Denies any previous hx of seizures, family hx of epilepsy, CNS infections. Remote head trauma from football. Denied taking medications that lower the seizure threshold.     Patient admitted and blood cultures drawn and are NGTD.  Had isolated fever to 101 on 1/8 and none since.  WBC has been elevated to 14s now WNL.  Patient treated with Vanc then Vanc/CTX and now vanc and zosyn given WBC wbc and concern for LE cellulitis.  The patient denies any recent fever, chills, or sweats.  ID consulted for evaluation and ABX recs.     Interval History: No AEON. Afebrile and WBC WNL.  Ortho aspirated bl knees and both with Ca pyrophosphate crystals.  The patient denies any recent fever, chills, or sweats.      Review of Systems   Constitutional: Positive for chills. Negative for diaphoresis and fever.   Respiratory: Negative for shortness of breath.    Cardiovascular: Negative for chest pain.   Gastrointestinal: Negative for abdominal pain, diarrhea, nausea and vomiting.   Genitourinary: Negative for dysuria and hematuria.   Musculoskeletal: Positive for arthralgias.   Skin: Positive for color change and rash.     Objective:     Vital Signs (Most Recent):  Temp: 97.6 °F (36.4 °C) (10/12/20 1600)  Pulse: 82 (10/12/20 1600)  Resp: (!) 21 (10/12/20 1600)  BP: 117/73 (10/12/20 1600)  SpO2: 98 % (10/12/20 1600) Vital Signs (24h Range):  Temp:  [97.6 °F (36.4 °C)-98.1 °F (36.7 °C)] 97.6 °F (36.4 °C)  Pulse:  [67-85]  82  Resp:  [18-26] 21  SpO2:  [94 %-98 %] 98 %  BP: (112-134)/(60-96) 117/73     Weight: 102.1 kg (225 lb)  Body mass index is 32.28 kg/m².    Estimated Creatinine Clearance: 84.9 mL/min (based on SCr of 0.9 mg/dL).    Physical Exam  Constitutional:       General: He is not in acute distress.     Appearance: He is well-developed. He is not diaphoretic.       HENT:      Head: Normocephalic and atraumatic.   Cardiovascular:      Rate and Rhythm: Normal rate and regular rhythm.      Heart sounds: Normal heart sounds. No murmur. No friction rub. No gallop.    Pulmonary:      Effort: Pulmonary effort is normal. No respiratory distress.      Breath sounds: Normal breath sounds. No wheezing or rales.   Abdominal:      General: Bowel sounds are normal. There is no distension.      Palpations: Abdomen is soft. There is no mass.      Tenderness: There is no abdominal tenderness. There is no guarding or rebound.   Skin:     General: Skin is warm and dry.   Neurological:      Mental Status: He is alert and oriented to person, place, and time.   Psychiatric:         Behavior: Behavior normal.                 Significant Labs:   Blood Culture:   Recent Labs   Lab 10/07/20  1110   LABBLOO No growth after 5 days.  No growth after 5 days.     CBC:   Recent Labs   Lab 10/11/20  0250 10/12/20  0847   WBC 9.10 7.89   HGB 12.8* 12.5*   HCT 37.7* 37.8*    263     CMP:   Recent Labs   Lab 10/11/20  0250 10/12/20  1248   *  --    K 3.2*  --    CL 97  --    CO2 23  --    *  --    BUN 11  --    CREATININE 0.8 0.9   CALCIUM 7.9*  --    PROT 6.4  --    ALBUMIN 2.3*  --    BILITOT 1.2*  --    ALKPHOS 68  --    AST 45*  --    ALT 32  --    ANIONGAP 12  --    EGFRNONAA >60.0 >60.0     Wound Culture:   Recent Labs   Lab 10/11/20  0858   LABAERO No growth  No growth     All pertinent labs within the past 24 hours have been reviewed.    Significant Imaging: I have reviewed all pertinent imaging results/findings within the past  24 hours.    Component Value Units Date/Time   X-Ray Knee 1 or 2 View Bilateral [[id number]] Resulted: 10/11/20 0923   Order Status: Completed Updated: 10/11/20 0925   Narrative:     EXAMINATION:   XR KNEE 1 OR 2 VIEW BILATERAL     CLINICAL HISTORY:   infection.  AP/ Lat;     TECHNIQUE:   Bilateral knee radiograph two views.     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     Left: There is moderate medial femorotibial and mild lateral femorotibial DJD with marginal osteophyte formation.  Minimal enthesopathic change at the patellar quadriceps insertion.     No definite fracture, dislocation, or osseous destruction in either knee.  Scattered vascular atherosclerosis.    Impression:       As above.       Electronically signed by: Agus Bernardo   Date: 10/11/2020   Time: 09:23   X-Ray Knee AP Standing Bilateral [[id number]] Resulted: 10/11/20 0710   Order Status: Completed Updated: 10/11/20 0713   Narrative:     EXAMINATION:   XR KNEE AP STANDING BILATERAL     CLINICAL HISTORY:   knee pain;     TECHNIQUE:   Difficulty getting complete exam given patient condition.  Two AP projection images of the knees were submitted.     COMPARISON:   None available     FINDINGS:   Right: There is severe femorotibial joint space narrowing with DJD and osteophyte formation, most pronounced at the lateral compartment.     Left: There is moderately advanced DJD with joint space narrowing of the medial femorotibial compartment.     No definite fracture.  Scattered vascular atherosclerosis.    Impression:       As above.       Electronically signed by: Agus Bernardo   Date: 10/11/2020   Time: 07:10   MRI Brain Epilepsy W W/O Contrast [[id number]] (Abnormal) Resulted: 10/07/20 2132   Order Status: Completed Updated: 10/07/20 2135   Narrative:     EXAMINATION:   MRI BRAIN  EPILEPSY W W/O CONTRAST     CLINICAL HISTORY:   seizures;     TECHNIQUE:   Sagittal 3D space FLAIR and sagittal 3D  T1 MP rage which were reformatted in the coronal and sagittal planes.  Axial T2, axial gradient, axial T1 and axial diffusion imaging of the whole brain without contrast.  In addition coronal thin section high-resolution T2 imaging in oblique plane was performed without contrast.  Following contrast administration axial T1 and sagittal T1 3D spoiled gradient which was reformatted in the coronal and axial planes were performed.  10 ML Gadavist intravenous contrast.     COMPARISON:   CT 10/06/2020     FINDINGS:   Generalized cerebral volume loss with compensatory enlargement ventricles sulci and cisterns without hydrocephalus.  Focal T2 FLAIR signal hyperintensity within the right thalamus corresponding to hypodensity seen on CT without diffusion abnormality or enhancement compatible with remote lacunar-type infarction.  There is slight motion distortion of the examination.  No abnormal parenchymal susceptibility to suggest parenchymal hemorrhage allowing for motion limitation.     There is no abnormal parenchymal enhancement.  There is numerous scattered patchy and confluent foci of T2 FLAIR signal hyperintensity supratentorial white matter while nonspecific concerning for moderate degree of chronic microvascular ischemic change.     Question slight asymmetrical volume loss right mesial temporal lobe compared the left although no significant asymmetrical signal abnormality cannot exclude right mesial temporal sclerosis in light of history of seizure activity.  Clinical correlation correlation with EEG findings advised.     This report was flagged in Epic as abnormal.    Impression:       Generalized cerebral volume loss slightly advanced for age with patchy and confluent regions of T2 FLAIR signal abnormality supratentorial white matter while nonspecific concerning for moderate degree of chronic  microvascular ischemic change.     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     No evidence for acute infarction or intracranial enhancing mass lesion.     There is slight asymmetrical volume loss in the mesial temporal lobes right being slightly greater than left although no significant asymmetrical signal abnormality can not exclude right mesial temporal sclerosis.  Clinical correlation and correlation with EEG advised.       Electronically signed by: Evert Priest DO   Date: 10/07/2020   Time: 21:32   X-Ray Chest 1 View [[id number]] Resulted: 10/07/20 1243   Order Status: Completed Updated: 10/07/20 1245   Narrative:     EXAMINATION:   XR CHEST 1 VIEW     CLINICAL HISTORY:   Leukocytosis;     FINDINGS:   Heart size is normal.  Lungs are clear and the bones show nothing unusual.    Impression:       No acute process seen.       Electronically signed by: Reno Gallardo MD   Date: 10/07/2020   Time: 12:43   CT Head Without Contrast [[id number]] Resulted: 10/06/20 2253   Order Status: Completed Updated: 10/06/20 2256   Narrative:     EXAMINATION:   CT HEAD WITHOUT CONTRAST     CLINICAL HISTORY:   Altered mental status;     TECHNIQUE:   Low dose axial images were obtained through the head.  Coronal and sagittal reformations were also performed. Contrast was not administered.     COMPARISON:   None.     FINDINGS:   The ventricular system, sulcal pattern and parenchymal attenuation characteristics are consistent with chronic change.  Involutional changes noted, chronic appearing white matter change is noted.  There is no evidence for intracranial mass, mass effect or midline shift and there is no evidence for acute intracranial hemorrhage.  Appropriate CSF spaces are seen at the skull base.     The visualized orbits appear intact.  The mastoid air cells appear appropriate when accounting for averaging, mild opacity along the external auditory canal on the left may relate to cerumen.  Paranasal sinuses  appear appropriate when accounting for averaging.    Impression:       Chronic intracranial changes are noted, there is no evidence for superimposed acute intracranial process.       Electronically signed by: Shai Car   Date: 10/06/2020   Time: 22:53   Imaging History    2020  Date Procedure Name Status Accession Number Location   10/11/20 09:14 AM X-Ray Knee 1 or 2 View Bilateral Final [id number] [facility]   10/10/20 06:07 PM X-Ray Knee AP Standing Bilateral Final [id number] [facility]   10/07/20 08:21 PM MRI Brain Epilepsy W W/O Contrast Final [id number] [facility]   10/07/20 12:25 PM X-Ray Chest 1 View Final [id number] [facility]   10/06/20 10:37 PM CT Head Without Contrast Final [id number] [facility]   10/07/20 01:13 PM Echo Color Flow Doppler? Yes Final [id number] [facility]

## 2020-10-13 NOTE — PLAN OF CARE
Problem: Fall Injury Risk  Goal: Absence of Fall and Fall-Related Injury  Intervention: Identify and Manage Contributors to Fall Injury Risk  Flowsheets (Taken 10/13/2020 0444)  Self-Care Promotion: independence encouraged  Medication Review/Management: medications reviewed  Intervention: Promote Injury-Free Environment  Flowsheets (Taken 10/13/2020 0444)  Safety Promotion/Fall Prevention: assistive device/personal item within reach  Environmental Safety Modification: assistive device/personal items within reach     Problem: Adult Inpatient Plan of Care  Goal: Absence of Hospital-Acquired Illness or Injury  Intervention: Identify and Manage Fall Risk  Flowsheets (Taken 10/13/2020 0444)  Safety Promotion/Fall Prevention: assistive device/personal item within reach  Intervention: Prevent VTE (venous thromboembolism)  Flowsheets (Taken 10/13/2020 0444)  VTE Prevention/Management: bleeding precautions maintained  Goal: Optimal Comfort and Wellbeing  Intervention: Provide Person-Centered Care  Flowsheets (Taken 10/13/2020 0444)  Trust Relationship/Rapport:   care explained   choices provided     Problem: Skin Injury Risk Increased  Goal: Skin Health and Integrity  Intervention: Optimize Skin Protection  Flowsheets (Taken 10/13/2020 0444)  Head of Bed (HOB): HOB at 30 degrees    Pt in bed with no complaints voiced, no acute distress noted at this time.  Assisted pt with bedtime care.  Bed locked and at lowest position.  Call light in hand.  Frequent assessments ongoing, no c/o pain

## 2020-10-13 NOTE — PLAN OF CARE
10/13/20 0942   Discharge Reassessment   Assessment Type Discharge Planning Reassessment   Do you have any problems affording any of your prescribed medications? TBD   Discharge Plan A Skilled Nursing Facility   Discharge Plan B Home with family;Home Health      oral

## 2020-10-14 LAB
ANION GAP SERPL CALC-SCNC: 12 MMOL/L (ref 8–16)
BACTERIA SPEC AEROBE CULT: NO GROWTH
BACTERIA SPEC AEROBE CULT: NO GROWTH
BASOPHILS # BLD AUTO: 0.05 K/UL (ref 0–0.2)
BASOPHILS NFR BLD: 0.6 % (ref 0–1.9)
BUN SERPL-MCNC: 12 MG/DL (ref 8–23)
CALCIUM SERPL-MCNC: 8.3 MG/DL (ref 8.7–10.5)
CHLORIDE SERPL-SCNC: 106 MMOL/L (ref 95–110)
CO2 SERPL-SCNC: 24 MMOL/L (ref 23–29)
CREAT SERPL-MCNC: 0.8 MG/DL (ref 0.5–1.4)
CREAT SERPL-MCNC: 0.8 MG/DL (ref 0.5–1.4)
DIFFERENTIAL METHOD: ABNORMAL
EOSINOPHIL # BLD AUTO: 0.5 K/UL (ref 0–0.5)
EOSINOPHIL NFR BLD: 6 % (ref 0–8)
ERYTHROCYTE [DISTWIDTH] IN BLOOD BY AUTOMATED COUNT: 12.1 % (ref 11.5–14.5)
EST. GFR  (AFRICAN AMERICAN): >60 ML/MIN/1.73 M^2
EST. GFR  (AFRICAN AMERICAN): >60 ML/MIN/1.73 M^2
EST. GFR  (NON AFRICAN AMERICAN): >60 ML/MIN/1.73 M^2
EST. GFR  (NON AFRICAN AMERICAN): >60 ML/MIN/1.73 M^2
GLUCOSE SERPL-MCNC: 87 MG/DL (ref 70–110)
HCT VFR BLD AUTO: 38.2 % (ref 40–54)
HGB BLD-MCNC: 12.6 G/DL (ref 14–18)
IMM GRANULOCYTES # BLD AUTO: 0.04 K/UL (ref 0–0.04)
IMM GRANULOCYTES NFR BLD AUTO: 0.5 % (ref 0–0.5)
LYMPHOCYTES # BLD AUTO: 1.1 K/UL (ref 1–4.8)
LYMPHOCYTES NFR BLD: 12.8 % (ref 18–48)
MCH RBC QN AUTO: 31.8 PG (ref 27–31)
MCHC RBC AUTO-ENTMCNC: 33 G/DL (ref 32–36)
MCV RBC AUTO: 97 FL (ref 82–98)
MONOCYTES # BLD AUTO: 1 K/UL (ref 0.3–1)
MONOCYTES NFR BLD: 11.8 % (ref 4–15)
NEUTROPHILS # BLD AUTO: 5.7 K/UL (ref 1.8–7.7)
NEUTROPHILS NFR BLD: 68.3 % (ref 38–73)
NRBC BLD-RTO: 0 /100 WBC
PLATELET # BLD AUTO: 318 K/UL (ref 150–350)
PMV BLD AUTO: 10.8 FL (ref 9.2–12.9)
POTASSIUM SERPL-SCNC: 3.3 MMOL/L (ref 3.5–5.1)
RBC # BLD AUTO: 3.96 M/UL (ref 4.6–6.2)
SODIUM SERPL-SCNC: 142 MMOL/L (ref 136–145)
WBC # BLD AUTO: 8.3 K/UL (ref 3.9–12.7)

## 2020-10-14 PROCEDURE — 36415 COLL VENOUS BLD VENIPUNCTURE: CPT

## 2020-10-14 PROCEDURE — 25000003 PHARM REV CODE 250: Performed by: PHYSICIAN ASSISTANT

## 2020-10-14 PROCEDURE — 99232 SBSQ HOSP IP/OBS MODERATE 35: CPT | Mod: ,,, | Performed by: PHYSICIAN ASSISTANT

## 2020-10-14 PROCEDURE — 97116 GAIT TRAINING THERAPY: CPT

## 2020-10-14 PROCEDURE — 80048 BASIC METABOLIC PNL TOTAL CA: CPT

## 2020-10-14 PROCEDURE — 99232 PR SUBSEQUENT HOSPITAL CARE,LEVL II: ICD-10-PCS | Mod: ,,, | Performed by: PHYSICIAN ASSISTANT

## 2020-10-14 PROCEDURE — 11000001 HC ACUTE MED/SURG PRIVATE ROOM

## 2020-10-14 PROCEDURE — 25000003 PHARM REV CODE 250: Performed by: HOSPITALIST

## 2020-10-14 PROCEDURE — 63600175 PHARM REV CODE 636 W HCPCS: Performed by: NURSE PRACTITIONER

## 2020-10-14 PROCEDURE — 85025 COMPLETE CBC W/AUTO DIFF WBC: CPT

## 2020-10-14 PROCEDURE — 25000003 PHARM REV CODE 250: Performed by: NURSE PRACTITIONER

## 2020-10-14 PROCEDURE — 97530 THERAPEUTIC ACTIVITIES: CPT

## 2020-10-14 RX ORDER — POTASSIUM CHLORIDE 20 MEQ/1
40 TABLET, EXTENDED RELEASE ORAL ONCE
Status: COMPLETED | OUTPATIENT
Start: 2020-10-14 | End: 2020-10-14

## 2020-10-14 RX ADMIN — METOPROLOL TARTRATE 100 MG: 100 TABLET ORAL at 09:10

## 2020-10-14 RX ADMIN — NAPROXEN 250 MG: 250 TABLET ORAL at 09:10

## 2020-10-14 RX ADMIN — ASPIRIN 81 MG: 81 TABLET, COATED ORAL at 09:10

## 2020-10-14 RX ADMIN — DOXYCYCLINE HYCLATE 100 MG: 100 TABLET, COATED ORAL at 08:10

## 2020-10-14 RX ADMIN — HEPARIN SODIUM 5000 UNITS: 5000 INJECTION INTRAVENOUS; SUBCUTANEOUS at 09:10

## 2020-10-14 RX ADMIN — HEPARIN SODIUM 5000 UNITS: 5000 INJECTION INTRAVENOUS; SUBCUTANEOUS at 02:10

## 2020-10-14 RX ADMIN — COLCHICINE 0.6 MG: 0.6 TABLET, FILM COATED ORAL at 09:10

## 2020-10-14 RX ADMIN — HEPARIN SODIUM 5000 UNITS: 5000 INJECTION INTRAVENOUS; SUBCUTANEOUS at 06:10

## 2020-10-14 RX ADMIN — PANTOPRAZOLE SODIUM 40 MG: 40 TABLET, DELAYED RELEASE ORAL at 09:10

## 2020-10-14 RX ADMIN — CEFADROXIL 1 G: 1 TABLET, FILM COATED ORAL at 08:10

## 2020-10-14 RX ADMIN — HYDROCHLOROTHIAZIDE 12.5 MG: 12.5 TABLET ORAL at 09:10

## 2020-10-14 RX ADMIN — AMLODIPINE BESYLATE 10 MG: 10 TABLET ORAL at 09:10

## 2020-10-14 RX ADMIN — POTASSIUM CHLORIDE 40 MEQ: 1500 TABLET, EXTENDED RELEASE ORAL at 11:10

## 2020-10-14 RX ADMIN — CEFADROXIL 1 G: 1 TABLET, FILM COATED ORAL at 09:10

## 2020-10-14 RX ADMIN — DOXYCYCLINE HYCLATE 100 MG: 100 TABLET, COATED ORAL at 09:10

## 2020-10-14 RX ADMIN — Medication 100 MG: at 09:10

## 2020-10-14 RX ADMIN — METOPROLOL TARTRATE 100 MG: 100 TABLET ORAL at 08:10

## 2020-10-14 RX ADMIN — FOLIC ACID 1 MG: 1 TABLET ORAL at 09:10

## 2020-10-14 RX ADMIN — FUROSEMIDE 40 MG: 40 TABLET ORAL at 09:10

## 2020-10-14 RX ADMIN — NAPROXEN 250 MG: 250 TABLET ORAL at 05:10

## 2020-10-14 RX ADMIN — THERA TABS 1 TABLET: TAB at 09:10

## 2020-10-14 NOTE — SUBJECTIVE & OBJECTIVE
Interval History: No acute events overnight. The patient was seen at the bedside today. He reports improvement in his knee pain, he has been able to ambulate and move legs without significant pain. No further signs or symptoms of withdrawal, valium tapered to 5 mg today. PT/OT recommending SNF, pt and wife agree and placement pending.        Review of Systems   Constitutional: Positive for activity change. Negative for chills and fever.   Respiratory: Negative for chest tightness and shortness of breath.    Cardiovascular: Negative for chest pain and leg swelling.   Gastrointestinal: Negative for abdominal pain and nausea.   Musculoskeletal: Positive for arthralgias and joint swelling. Negative for neck stiffness.   Skin: Positive for color change (improved).   Neurological: Positive for tremors. Negative for dizziness, seizures, syncope, facial asymmetry, speech difficulty, weakness and light-headedness.   Psychiatric/Behavioral: Negative for confusion. The patient is not nervous/anxious.      Objective:     Vital Signs (Most Recent):  Temp: 98.3 °F (36.8 °C) (10/13/20 1200)  Pulse: 82 (10/13/20 1200)  Resp: 20 (10/13/20 1200)  BP: 132/65 (10/13/20 1200)  SpO2: 96 % (10/13/20 1200) Vital Signs (24h Range):  Temp:  [97.2 °F (36.2 °C)-98.3 °F (36.8 °C)] 98.3 °F (36.8 °C)  Pulse:  [55-90] 82  Resp:  [17-30] 20  SpO2:  [95 %-100 %] 96 %  BP: (117-140)/(56-73) 132/65     Weight: 102.1 kg (225 lb)  Body mass index is 32.28 kg/m².    Intake/Output Summary (Last 24 hours) at 10/13/2020 1926  Last data filed at 10/13/2020 0900  Gross per 24 hour   Intake 790 ml   Output 2002 ml   Net -1212 ml      Physical Exam  Vitals signs and nursing note reviewed.   Constitutional:       Appearance: He is well-developed.   Eyes:      General: No scleral icterus.     Extraocular Movements: Extraocular movements intact.   Cardiovascular:      Rate and Rhythm: Normal rate and regular rhythm.      Pulses: Normal pulses.   Pulmonary:       Effort: Pulmonary effort is normal.      Breath sounds: Normal breath sounds.   Abdominal:      General: Bowel sounds are normal.      Palpations: Abdomen is soft.      Tenderness: There is no abdominal tenderness. There is no guarding or rebound.   Musculoskeletal: Normal range of motion.         General: No swelling or tenderness.      Comments: Swelling and erythema of the left lower extremity improved.  Mild pain with ROM of bilateral knees, significantly improved. Warmth of R knee and swelling present.   Skin:     General: Skin is warm and dry.      Findings: No erythema (resolved).   Neurological:      General: No focal deficit present.      Mental Status: He is alert and oriented to person, place, and time.      Sensory: No sensory deficit.      Motor: Tremor (L hand at rest) present. No weakness.         Significant Labs: All pertinent labs within the past 24 hours have been reviewed.    Significant Imaging: I have reviewed all pertinent imaging results/findings within the past 24 hours.

## 2020-10-14 NOTE — ASSESSMENT & PLAN NOTE
-Patient known to drink 10-12 beers/day.  Last ETOH just prior to syncopal episode, 4 cans on day of admission  -Monitor for s/s withdrawal  -CIWA Q8h  -Primary team to be notified if CIWA>10  -Thiamine, Folate, MVI daily  - PETH pending  - started on diazepam 5mg TID for withdrawal ppx, increased to 10 mg tid on 10/8 in setting of fever and increased confusion/agitation. No further signs or symptoms of withdrawal, last dose of valium today to complete taper.

## 2020-10-14 NOTE — ASSESSMENT & PLAN NOTE
Leukocytosis  - redness, warmth and tenderness noted to LLE on admission; reported pain since scratching it at home a few days ago  - 1/4 SIRS on admission w/ WBC 14; afebrile & HDS  - started on IV vanc on 10/07  - pt spiked fever on 10/08- Tmax 101. WBC increased from 12.85 to 13.98. Added ceftriaxone for broader coverage. WBC increased further on 10/09, CTX discontinued and started zosyn. ID consulted and recommending discontinuing zosyn and treating with vanc + CTX  - transitioned to PO Doxycycline 100mg po bid and cefadroxil 1g po bid  (will continue at discharge until 10/20/2020 to complete 14d total of abx for LLE cellulitis)  - Follow up appointment in ID clinic on scheduled for 10/20/2020 at 02:00 PM

## 2020-10-14 NOTE — PT/OT/SLP PROGRESS
Occupational Therapy   Treatment    Name: Roshan Rankin  MRN: [id number]  Admitting Diagnosis:  Convulsion        Co-treat with PT due to pt's decreased activity tolerance and increased level of skilled assistance required     Recommendations:     Discharge Recommendations: nursing facility, skilled  Discharge Equipment Recommendations:  other (see comments)(TBD)  Barriers to discharge:  Other (Comment)(increased assistance at current level of function)    Assessment:     Roshan Rankin is a 75 y.o. male with a medical diagnosis of Convulsion.  Pt with improved activity tolerance this date due to decreased R knee pain compared to the previous session. However, pt is still unable to safely return home at this time due to his impaired functional mobility.   He presents with the following deficits. Performance deficits affecting function are weakness, impaired endurance, impaired self care skills, impaired functional mobilty, gait instability, impaired balance, decreased lower extremity function, decreased coordination, pain.     Rehab Prognosis:  Good; patient would benefit from acute skilled OT services to address these deficits and reach maximum level of function.       Plan:     Patient to be seen 4 x/week to address the above listed problems via self-care/home management, therapeutic activities, therapeutic exercises  · Plan of Care Expires: 11/09/20  · Plan of Care Reviewed with: spouse, patient    Subjective     Pain/Comfort:  Pain Rating 1: 0/10  Pain Rating Post-Intervention 1: 0/10    Objective:     Communicated with: RN and PT prior to session.  Patient found supine with telemetry, pulse ox (continuous) with his wife present upon OT entry to room.    General Precautions: Standard, aspiration, fall, seizure   Orthopedic Precautions:N/A   Braces: N/A     Occupational Performance:     Bed Mobility:    · Patient completed Rolling/Turning to Left with  stand by assistance  · Patient completed Scooting/Bridging with  stand by assistance  · Patient completed Supine to Sit with minimum assistance     Functional Mobility/Transfers:  · Patient completed Sit <> Stand Transfer from EOB with maximal assistance and of 2 persons  with  rolling walker   · Patient completed Bed <> Chair Transfer using Step Transfer technique with maximal assistance and of 2 persons with rolling walker  · Functional Mobility: Pt ambulated ~6 steps from EOB to bedside chair with Max A of 2 people with RW.    Activities of Daily Living:  · Grooming: setup asssitance to wash his face while UIC.  Pt said he had already brushed his teeth.       AMPAC 6 Click ADL: 16    Treatment & Education:  - Pt and his wife edu on role of OT, POC, safety when performing self care tasks, benefit of performing OOB activity, and safety when performing functional transfers and mobility.  - White board updated  - Self care tasks completed-- as noted above       Patient left up in chair with all lines intact, call button in reach, and his wife presentEducation:      GOALS:   Multidisciplinary Problems     Occupational Therapy Goals        Problem: Occupational Therapy Goal    Goal Priority Disciplines Outcome Interventions   Occupational Therapy Goal     OT, PT/OT Ongoing, Progressing    Description: Goals to be met by: 10/26/2020    Patient will increase functional independence with ADLs by performing:    UE Dressing with Modified [place].  LE Dressing with Minimum Assistance with appropriate adaptive equipment as needed.  Grooming while standing with Contact Guard Assistance.  Toileting from bedside commode with Minimal Assistance for hygiene and clothing management.   Sit to stand transfer with Minimal Assistance with rolling walker.  Toilet transfer to bedside commode with Minimal Assistance with rolling walker.                     Time Tracking:     OT Date of Treatment: 10/14/20  OT Start Time: 0959  OT Stop Time: 1018  OT Total Time (min): 19 min    Billable  Minutes:Therapeutic Activity 19 min.    Wesley Simpson, OT  10/14/2020

## 2020-10-14 NOTE — PROGRESS NOTES
"[facility] - ICU 14 [facility] Medicine  Progress Note    Patient Name: Roshan Rankin  MRN: [id number]  Patient Class: IP- Inpatient   Admission Date: 10/6/2020  Length of Stay: 5 days  Attending Physician: Micky Crockett MD  Primary Care Provider: Ben Brannon MD        Subjective:     Principal Problem:Convulsion        HPI:  Roshan Rankin is a 75 y.o. male with a significant medical history of HTN, ETOH abuse (10-12 beers every day) who presents to the hospital complaining of syncopal episode.  HPI information gathered from review of the patient's medical record due to the patient being unaware of preceding events.  The patient's wife was inside the house when she heard a thud in the garage.  She went out and found the patient on his chest w/his eye closed and labored breathing.  She denies tremors or tonic clonic seizure like activity.  No bowel or urinary incontinence was reported.  The patient had blood in his mouth indicating he may have bitten his tongue.  He was not responsive to his wife's questions so she called 911.  The patient was brought to the ED for evaluation.  There was no reported history of seizures.   Shortly after arrival to the ED the patient began to return to baseline.  He did not recall falling or if he had any preceding symptoms.  The patient denied CP, SOB, HA, change in vision, dizziness, or N/V.  ED work up included labs that revealed a WBC 14.71, Na 131.  Other labs grossly normal.  Currently the patient is AA&O x3.  He is c/o right sided chest pain that he describes as "like I pulled a muscle".  He has no other complaints at this time.  Per his nurse, the patient's speed to respond has improved since his arrival in his room.  The patient is admitted to Hospital Medicine.    Overview/Hospital Course:  Mr. Rankin was admitted to hospital medicine service for evaluation of possible syncopal episodes with convulsions. In the ED, he was witnessed to have a generalized " tonic clonic lasting 10-15 seconds with tongue biting. Was given total of 2 g IV Keppra then started on 500 mg BID. CT head WO contrast unremarkable for acute intracranial pathology. Labs with leukocytosis (14) and hyponatremia (131). No previous hx of seizures or similar episodes. Pt and wife did say he drank less alcohol on day of admission (~4 beers all day), but denies recent cut back in alcohol consumption. Neuro consulted, suspect cluster of seizures (3 within 24 hr period) was provoked in the setting of hyponatremia likely due to chronic alcohol abuse. MRI w w/o contrast showed generalized volume loss and moderate degree of chronic microvascular ischemic disease. Small R thalamic lacunar infarct. Slight asymmetrical volume loss in the mesial temporal lobes R slightly greater than L. No acute intracranial pathology. 24 hr vEEG showed mild, generalized, non-specific cerebral dysfunction. Neurology suspect alcohol withdrawal provoked seizure, and no AEDs at this time. Pt treated with valium taper for alcohol withdrawal. Continue seizure precautions    Patient with notable LLE cellulitis on exam, started on IV vanc on 10/07. Suspect cellulitis etiology of leukocytosis. Bcx NGTD. WBC increased on 10/8 and pt spiked fever of 101. Added Ceftriaxone for broader coverage and increased valium for possible alcohol withdrawal sxs.  Transitioned patient from CTX to zosyn due to persistent leukocytosis and consulted ID- recommended resuming previous tx with vanc + CTX. Pt transitioned to oral abx on 10/12, and will be discharged on orals to complete 14 day course (last dose 10/20). Consulted ortho surgery for evaluation of bilateral knee pain to r/o septic arthritis. Joint fluid revealed pseudogout. Pt treated with colchicine and naproxen.    PT/OT recommending SNF, placement pending. Pt will need to follow up in ID clinic 1 week after discharge.    Interval History: No acute events overnight. The patient was seen at the  bedside today. He reports improvement in his knee pain, he has been able to ambulate and move legs without significant pain. No further signs or symptoms of withdrawal, valium tapered to 5 mg today. PT/OT recommending SNF, pt and wife agree and placement pending.        Review of Systems   Constitutional: Positive for activity change. Negative for chills and fever.   Respiratory: Negative for chest tightness and shortness of breath.    Cardiovascular: Negative for chest pain and leg swelling.   Gastrointestinal: Negative for abdominal pain and nausea.   Musculoskeletal: Positive for arthralgias and joint swelling. Negative for neck stiffness.   Skin: Positive for color change (improved).   Neurological: Positive for tremors. Negative for dizziness, seizures, syncope, facial asymmetry, speech difficulty, weakness and light-headedness.   Psychiatric/Behavioral: Negative for confusion. The patient is not nervous/anxious.      Objective:     Vital Signs (Most Recent):  Temp: 98.3 °F (36.8 °C) (10/13/20 1200)  Pulse: 82 (10/13/20 1200)  Resp: 20 (10/13/20 1200)  BP: 132/65 (10/13/20 1200)  SpO2: 96 % (10/13/20 1200) Vital Signs (24h Range):  Temp:  [97.2 °F (36.2 °C)-98.3 °F (36.8 °C)] 98.3 °F (36.8 °C)  Pulse:  [55-90] 82  Resp:  [17-30] 20  SpO2:  [95 %-100 %] 96 %  BP: (117-140)/(56-73) 132/65     Weight: 102.1 kg (225 lb)  Body mass index is 32.28 kg/m².    Intake/Output Summary (Last 24 hours) at 10/13/2020 1926  Last data filed at 10/13/2020 0900  Gross per 24 hour   Intake 790 ml   Output 2002 ml   Net -1212 ml      Physical Exam  Vitals signs and nursing note reviewed.   Constitutional:       Appearance: He is well-developed.   Eyes:      General: No scleral icterus.     Extraocular Movements: Extraocular movements intact.   Cardiovascular:      Rate and Rhythm: Normal rate and regular rhythm.      Pulses: Normal pulses.   Pulmonary:      Effort: Pulmonary effort is normal.      Breath sounds: Normal breath  sounds.   Abdominal:      General: Bowel sounds are normal.      Palpations: Abdomen is soft.      Tenderness: There is no abdominal tenderness. There is no guarding or rebound.   Musculoskeletal: Normal range of motion.         General: No swelling or tenderness.      Comments: Swelling and erythema of the left lower extremity improved.  Mild pain with ROM of bilateral knees, significantly improved. Warmth of R knee and swelling present.   Skin:     General: Skin is warm and dry.      Findings: No erythema (resolved).   Neurological:      General: No focal deficit present.      Mental Status: He is alert and oriented to person, place, and time.      Sensory: No sensory deficit.      Motor: Tremor (L hand at rest) present. No weakness.         Significant Labs: All pertinent labs within the past 24 hours have been reviewed.    Significant Imaging: I have reviewed all pertinent imaging results/findings within the past 24 hours.      Assessment/Plan:      * Convulsion  Syncope    Roshan Rankin is a 75 y.o. male with a significant medical history of HTN, ETOH abuse (10-12 beers every day) who presents to the hospital complaining of syncopal episode.  The patient was found down in his garage by his wife, unresponsive with labored respirations.  He slowly returned to baseline while in the ED. Prior to admission the patient had a witnessed episode of convulsion.  He was loaded with Keppra 1 gm.    - 2D Echo: EF 65% with normal systolic & diastolic function  -  Neurology consulted, suspect cluster of seizures (3 within 24 hr period) was provoked in the setting of hyponatremia likely due to chronic alcohol abuse  - started on Keppra 500 mg BID on admission, held per neurology recs  - MRI brain W WO contrast (epilepsy protocol):   Generalized cerebral volume loss slightly advanced for age with patchy and confluent regions of T2 FLAIR signal abnormality supratentorial white matter.   There is a small remote right thalamic  lacunar type infarct.  No evidence for acute infarction or intracranial enhancing mass lesion.  There is slight asymmetrical volume loss in the mesial temporal lobes right being slightly greater than left although no significant asymmetrical signal abnormality can not exclude right mesial temporal sclerosis.    - 24 hr EEG shows mild, generalized, non-specific cerebral dysfunction.  Assessment of activity in the right temporal lobe is significantly limited.   - per neurology, clear to discontinue continuous EEG monitoring at this time.   - No AEDs recommended at this time  - CIWA protocol as noted below  - Seizure precautions  - aspiration precautions  - neuro checks q4h    Pseudogout involving multiple joints  Knee pain, bilateral  Pt complaining of bilateral knee pain. He reports that the pain is chronic due to bilateral arthritis and is an 8/10 at home on a normal basis. He had 2 surgeries on his L knee many years ago after a football injury. He walks with a limp at baseline due to the L knee pain. However, pain has increased during hospitalization.    - Physical exam remarkable for pain with ROM bilaterally and warmth to R knee. No bony tenderness to palpation, no joint swelling or erythema noted, 5/5 strength bilaterally- although assessment limited 2/2 pain  - PT/OT consulted- recommending rehab  - ID concern for R knee infection, ortho surgery consulted for septic arthritis rule out  - X-ray knees bilaterally:   R Knee: Severe OA with significant erosion of joint space in medial, lateral, and patellofemoral compartments.  Significant osteophytosis. No signs of fracture or dislocation.  Calcific artery disease of popliteal vessel noted.    L Knee: Evidence of osteoarthritis with medial and lateral joint space narrowing with osteophytosis.  Medial compartment more affected than latera.  No evidence of fracture or dislocation. Calcific artery disease of popliteal vessel noted.   - Arthrocentesis performed of  "bilateral knees. Both knees had cell count <50k and were positive for pseudogout.  - Uric acid WNL  - no concern at this time for septic arthritis. no orthopedic follow up necessary  - Start colchicine 1.2 mg once then 0.6 mg daily  - Naproxen 250 bid  - lidocaine patch prn    Cellulitis of left lower extremity  Leukocytosis  - redness, warmth and tenderness noted to LLE on admission; reported pain since scratching it at home a few days ago  - 1/4 SIRS on admission w/ WBC 14; afebrile & HDS  - started on IV vanc on 10/07  - pt spiked fever on 10/08- Tmax 101. WBC increased from 12.85 to 13.98. Added ceftriaxone for broader coverage. WBC increased further on 10/09, CTX discontinued and started zosyn. ID consulted and recommending discontinuing zosyn and treating with vanc + CTX  - transitioned to PO Doxycycline 100mg po bid and cefadroxil 1g po bid  (will continue at discharge until 10/20/2020 to complete 14d total of abx for LLE cellulitis)  - pt will follow up in ID clinic in approx 1 week    Other chest pain  -Patient c/o right sided chest pain that he describes as "like I pulled a muscle".  Not reproducible on exam.  -Troponin <0.006  - BNP 97  - EKG: no acute changes  - resolved    Hyponatremia  -Na 131 on admission.  - cc/hr  - improved to 133, continue to monitor with daily labs    Alcohol abuse  -Patient known to drink 10-12 beers/day.  Last ETOH just prior to syncopal episode, 4 cans on day of admission  -Monitor for s/s withdrawal  -CIWA Q8h  -Primary team to be notified if CIWA>10  -Thiamine, Folate, MVI daily  - PETH pending  - started on diazepam 5mg TID for withdrawal ppx, increased to 10 mg tid on 10/8 in setting of fever and increased confusion/agitation. No further signs or symptoms of withdrawal, last dose of valium today to complete taper.    Essential hypertension  -Continue home Norvasc, HCTZ, metoprolol, and lasix  -Continuing home meds may compensate for s/s of ETOH withdrawals.  Other " s/s such as tremors, diaphoresis, hallucinations, etc may be more reliable indicators.    Bilateral pain of leg and foot  - pt complaining of bilateral knee pain. He reports that the pain is chronic and is an 8/10 at home on a normal basis. Pain has increased during hospitalization due to decreased use  - Pt reports history of arthritis bilaterally. He has had 2 surgeries on his L knee years ago. He walks with a limp at baseline due to chronic knee pain.    - Physical exam remarkable for pain with ROM bilaterally. No bony tenderness to palpation, no joint swelling or erythema noted, 5/5 strength bilaterally- although assessment limited 2/2 pain  - PT/OT consulted  - X-ray bilateral knees pending.   - ID concern for R knee infection, will consult ortho surgery in am for evaluation  - lidocaine patch prn    Syncope  Convulsion  Roshan Rankin is a 75 y.o. male with a significant medical history of HTN, ETOH abuse (10-12 beers every day) who presents to the hospital complaining of syncopal episode.  The patient was found down in his garage by his wife, unresponsive with labored respirations.  He slowly returned to baseline while in the ED. Prior to admission the patient had a witnessed episode of convulsion.  He was loaded with Keppra 1 gm.  -2D Echo  -Telemetry  -EEG  -Seizure precautions  -Keppra 500 mg BID  -Consult General Neurology, appreciate the consult and recs      VTE Risk Mitigation (From admission, onward)         Ordered     heparin (porcine) injection 5,000 Units  Every 8 hours      10/07/20 0109     IP VTE HIGH RISK PATIENT  Once      10/07/20 0109     Place sequential compression device  Until discontinued      10/07/20 0109                Discharge Planning   SHANE: 10/14/2020     Code Status: Full Code   Is the patient medically ready for discharge?: No    Reason for patient still in hospital (select all that apply): Pending disposition  Discharge Plan A: Skilled Nursing Facility   Discharge Delays: None  known at this time            Discussed with staff, Keira Velasco PA-C  Department of Hospital Medicine   [facility] - ICU [facility]

## 2020-10-14 NOTE — PLAN OF CARE
Problem: Occupational Therapy Goal  Goal: Occupational Therapy Goal  Description: Goals to be met by: 10/26/2020    Patient will increase functional independence with ADLs by performing:    UE Dressing with Modified [place].  LE Dressing with Minimum Assistance with appropriate adaptive equipment as needed.  Grooming while standing with Contact Guard Assistance.  Toileting from bedside commode with Minimal Assistance for hygiene and clothing management.   Sit to stand transfer with Minimal Assistance with rolling walker.  Toilet transfer to bedside commode with Minimal Assistance with rolling walker.    10/14/2020 1632 by Welsey Simpson OT  Outcome: Ongoing, Progressing  Pt's goals updated due to his current level of function and decreased activity tolerance.    Wesley Simpson OT   10/14/2020

## 2020-10-14 NOTE — PLAN OF CARE
SW awaiting auth and admitting paperwork to be finalized. Pt accepted to [facility].     SW spoke to facility liaison who is coordinating off and paperwork signing with family/     Sw contacted pt wife to advise of pending d/c once auth is obtained. Wife is agreeable with pt going to [facility].    Tere Taveras LMSW  Case Management Social Worker   [facility] [address]     Name band;

## 2020-10-14 NOTE — SUBJECTIVE & OBJECTIVE
Interval History:  No acute events overnight. The patient was seen at the bedside today. He notes further improvement in pain and has no complaints. Significant improvement in ROM, no tenderness to palpation today. Valium taper complete with no further signs of withdrawal. Patient medically stable for discharge with PO antibiotics, SNF placement pending.     Review of Systems   Constitutional: Positive for activity change. Negative for chills and fever.   Respiratory: Negative for chest tightness and shortness of breath.    Cardiovascular: Negative for chest pain and leg swelling.   Gastrointestinal: Negative for abdominal pain and nausea.   Musculoskeletal: Positive for arthralgias and joint swelling. Negative for neck stiffness.   Skin: Positive for color change (improved).   Neurological: Positive for tremors. Negative for dizziness, seizures, syncope, facial asymmetry, speech difficulty, weakness and light-headedness.   Psychiatric/Behavioral: Negative for confusion. The patient is not nervous/anxious.      Objective:     Vital Signs (Most Recent):  Temp: 98.3 °F (36.8 °C) (10/14/20 1124)  Pulse: 67 (10/14/20 1550)  Resp: 15 (10/14/20 1550)  BP: (!) 144/67 (10/14/20 1124)  SpO2: 97 % (10/14/20 1550) Vital Signs (24h Range):  Temp:  [97.8 °F (36.6 °C)-98.7 °F (37.1 °C)] 98.3 °F (36.8 °C)  Pulse:  [] 67  Resp:  [12-19] 15  SpO2:  [95 %-98 %] 97 %  BP: (120-144)/(58-67) 144/67     Weight: 102.1 kg (225 lb)  Body mass index is 32.28 kg/m².    Intake/Output Summary (Last 24 hours) at 10/14/2020 1724  Last data filed at 10/14/2020 0500  Gross per 24 hour   Intake 1375 ml   Output 1750 ml   Net -375 ml      Physical Exam  Vitals signs and nursing note reviewed.   Constitutional:       Appearance: He is well-developed.   Eyes:      General: No scleral icterus.     Extraocular Movements: Extraocular movements intact.   Cardiovascular:      Rate and Rhythm: Normal rate and regular rhythm.      Pulses: Normal pulses.    Pulmonary:      Effort: Pulmonary effort is normal.      Breath sounds: Normal breath sounds.   Abdominal:      General: Bowel sounds are normal.      Palpations: Abdomen is soft.      Tenderness: There is no abdominal tenderness. There is no guarding or rebound.   Musculoskeletal: Normal range of motion.         General: No swelling or tenderness.      Comments: Swelling and erythema of the left lower extremity improved.  Mild pain with ROM of bilateral knees, significantly improved. Warmth of R knee and swelling present.   Skin:     General: Skin is warm and dry.      Findings: No erythema (resolved).   Neurological:      General: No focal deficit present.      Mental Status: He is alert and oriented to person, place, and time.      Sensory: No sensory deficit.      Motor: Tremor (L hand at rest) present. No weakness.         Significant Labs: All pertinent labs within the past 24 hours have been reviewed.    Significant Imaging: I have reviewed all pertinent imaging results/findings within the past 24 hours.

## 2020-10-14 NOTE — PLAN OF CARE
Problem: Physical Therapy Goal  Goal: Physical Therapy Goal  Description: Goals to be met by: 10/26/2020     Patient will increase functional independence with mobility by performin. Supine to sit with Contact Guard Assistance  2. Sit to supine with Contact Guard Assistance  3. Sit to stand transfer with Minimal Assistance  4. Bed to chair transfer with Minimal Assistance using LRAD.  5. Gait  x 25 feet with Minimal Assistance using LRAD.    6. Lower extremity exercise program x30 reps per handout, with independence    Outcome: Ongoing, Progressing   Patient tolerated treatment well. Established POC and goals reviewed and remain appropriate. Plan is to continue to improve patient's functional mobility capabilities.      Camila Sims, PT, DPT  10/14/2020

## 2020-10-14 NOTE — ASSESSMENT & PLAN NOTE
-Patient known to drink 10-12 beers/day.  Last ETOH just prior to syncopal episode, 4 cans on day of admission  -Monitor for s/s withdrawal  -CIWA Q8h  -Primary team to be notified if CIWA>10  -Thiamine, Folate, MVI daily  - PETH pending  - started on diazepam 5mg TID for withdrawal ppx, increased to 10 mg tid on 10/8 in setting of fever and increased confusion/agitation. No further signs or symptoms of withdrawal. Valium taper completed

## 2020-10-14 NOTE — PROGRESS NOTES
"[facility] Medicine  Progress Note    Patient Name: Roshan Rankin  MRN: [id number]  Patient Class: IP- Inpatient   Admission Date: 10/6/2020  Length of Stay: 6 days  Attending Physician: Micky Crockett MD  Primary Care Provider: Ben Brannon MD        Subjective:     Principal Problem:Convulsion        HPI:  Roshan Rankin is a 75 y.o. male with a significant medical history of HTN, ETOH abuse (10-12 beers every day) who presents to the hospital complaining of syncopal episode.  HPI information gathered from review of the patient's medical record due to the patient being unaware of preceding events.  The patient's wife was inside the house when she heard a thud in the garage.  She went out and found the patient on his chest w/his eye closed and labored breathing.  She denies tremors or tonic clonic seizure like activity.  No bowel or urinary incontinence was reported.  The patient had blood in his mouth indicating he may have bitten his tongue.  He was not responsive to his wife's questions so she called 911.  The patient was brought to the ED for evaluation.  There was no reported history of seizures.   Shortly after arrival to the ED the patient began to return to baseline.  He did not recall falling or if he had any preceding symptoms.  The patient denied CP, SOB, HA, change in vision, dizziness, or N/V.  ED work up included labs that revealed a WBC 14.71, Na 131.  Other labs grossly normal.  Currently the patient is AA&O x3.  He is c/o right sided chest pain that he describes as "like I pulled a muscle".  He has no other complaints at this time.  Per his nurse, the patient's speed to respond has improved since his arrival in his room.  The patient is admitted to Hospital Medicine.    Overview/Hospital Course:  Mr. Rankin was admitted to hospital medicine service for evaluation of possible syncopal episodes with convulsions. In the ED, he was witnessed to have a generalized " tonic clonic lasting 10-15 seconds with tongue biting. Was given total of 2 g IV Keppra then started on 500 mg BID. CT head WO contrast unremarkable for acute intracranial pathology. Labs with leukocytosis (14) and hyponatremia (131). No previous hx of seizures or similar episodes. Pt and wife did say he drank less alcohol on day of admission (~4 beers all day), but denies recent cut back in alcohol consumption. Neuro consulted, suspect cluster of seizures (3 within 24 hr period) was provoked in the setting of hyponatremia likely due to chronic alcohol abuse. MRI w w/o contrast showed generalized volume loss and moderate degree of chronic microvascular ischemic disease. Small R thalamic lacunar infarct. Slight asymmetrical volume loss in the mesial temporal lobes R slightly greater than L. No acute intracranial pathology. 24 hr vEEG showed mild, generalized, non-specific cerebral dysfunction. Neurology suspect alcohol withdrawal provoked seizure, and no AEDs at this time. Pt treated with valium taper for alcohol withdrawal.     Cellulitis of left lower extremity on admission, likely the etiology of leukocytosis, started on IV vanc on 10/07. Bcx NGTD. WBC increased on 10/8 and pt spiked fever of 101. Added Ceftriaxone for broader coverage and increased valium for possible alcohol withdrawal sxs.  Transitioned patient from CTX to zosyn due to persistent leukocytosis and consulted ID- recommended resuming previous tx with vanc + CTX. Pt transitioned to oral abx on 10/12, and will be discharged on orals to complete 14 day course (last dose 10/20). Consulted ortho surgery for evaluation of bilateral knee pain to r/o septic arthritis. Joint fluid revealed pseudogout. Pt treated with colchicine and naproxen.    PT/OT recommending SNF, placement pending. Pt scheduled for follow up in ID clinic on 10/20/2020. Ambulatory referral to internal medicine at discharge to establish care with PCP.    Interval History:  No acute  events overnight. The patient was seen at the bedside today. He notes further improvement in pain and has no complaints. Significant improvement in ROM, no tenderness to palpation today. Valium taper complete with no further signs of withdrawal. Patient medically stable for discharge with PO antibiotics, SNF placement pending.     Review of Systems   Constitutional: Positive for activity change. Negative for chills and fever.   Respiratory: Negative for chest tightness and shortness of breath.    Cardiovascular: Negative for chest pain and leg swelling.   Gastrointestinal: Negative for abdominal pain and nausea.   Musculoskeletal: Positive for arthralgias and joint swelling. Negative for neck stiffness.   Skin: Positive for color change (improved).   Neurological: Positive for tremors. Negative for dizziness, seizures, syncope, facial asymmetry, speech difficulty, weakness and light-headedness.   Psychiatric/Behavioral: Negative for confusion. The patient is not nervous/anxious.      Objective:     Vital Signs (Most Recent):  Temp: 98.3 °F (36.8 °C) (10/14/20 1124)  Pulse: 67 (10/14/20 1550)  Resp: 15 (10/14/20 1550)  BP: (!) 144/67 (10/14/20 1124)  SpO2: 97 % (10/14/20 1550) Vital Signs (24h Range):  Temp:  [97.8 °F (36.6 °C)-98.7 °F (37.1 °C)] 98.3 °F (36.8 °C)  Pulse:  [] 67  Resp:  [12-19] 15  SpO2:  [95 %-98 %] 97 %  BP: (120-144)/(58-67) 144/67     Weight: 102.1 kg (225 lb)  Body mass index is 32.28 kg/m².    Intake/Output Summary (Last 24 hours) at 10/14/2020 1724  Last data filed at 10/14/2020 0500  Gross per 24 hour   Intake 1375 ml   Output 1750 ml   Net -375 ml      Physical Exam  Vitals signs and nursing note reviewed.   Constitutional:       Appearance: He is well-developed.   Eyes:      General: No scleral icterus.     Extraocular Movements: Extraocular movements intact.   Cardiovascular:      Rate and Rhythm: Normal rate and regular rhythm.      Pulses: Normal pulses.   Pulmonary:      Effort:  Pulmonary effort is normal.      Breath sounds: Normal breath sounds.   Abdominal:      General: Bowel sounds are normal.      Palpations: Abdomen is soft.      Tenderness: There is no abdominal tenderness. There is no guarding or rebound.   Musculoskeletal: Normal range of motion.         General: No swelling or tenderness.      Comments: Swelling and erythema of the left lower extremity improved.  Mild pain with ROM of bilateral knees, significantly improved. Warmth of R knee and swelling present.   Skin:     General: Skin is warm and dry.      Findings: No erythema (resolved).   Neurological:      General: No focal deficit present.      Mental Status: He is alert and oriented to person, place, and time.      Sensory: No sensory deficit.      Motor: Tremor (L hand at rest) present. No weakness.         Significant Labs: All pertinent labs within the past 24 hours have been reviewed.    Significant Imaging: I have reviewed all pertinent imaging results/findings within the past 24 hours.      Assessment/Plan:      * Convulsion  Syncope    Roshan Rankin is a 75 y.o. male with a significant medical history of HTN, ETOH abuse (10-12 beers every day) who presents to the hospital complaining of syncopal episode.  The patient was found down in his garage by his wife, unresponsive with labored respirations.  He slowly returned to baseline while in the ED. Prior to admission the patient had a witnessed episode of convulsion.  He was loaded with Keppra 1 gm.    - 2D Echo: EF 65% with normal systolic & diastolic function  -  Neurology consulted, suspect cluster of seizures (3 within 24 hr period) was provoked in the setting of hyponatremia likely due to chronic alcohol abuse  - started on Keppra 500 mg BID on admission, held per neurology recs  - MRI brain W WO contrast (epilepsy protocol):   Generalized cerebral volume loss slightly advanced for age with patchy and confluent regions of T2 FLAIR signal abnormality  supratentorial white matter.   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  No evidence for acute infarction or intracranial enhancing mass lesion.  There is slight asymmetrical volume loss in the mesial temporal lobes right being slightly greater than left although no significant asymmetrical signal abnormality can not exclude right mesial temporal sclerosis.    - 24 hr EEG shows mild, generalized, non-specific cerebral dysfunction.  Assessment of activity in the right temporal lobe is significantly limited.   - per neurology, clear to discontinue continuous EEG monitoring at this time.   - No AEDs recommended at this time  - CIWA protocol as noted below  - Seizure precautions  - aspiration precautions  - neuro checks q4h    Pseudogout involving multiple joints  Knee pain, bilateral  Pt complaining of bilateral knee pain. He reports that the pain is chronic due to bilateral arthritis and is an 8/10 at home on a normal basis. He had 2 surgeries on his L knee many years ago after a football injury. He walks with a limp at baseline due to the L knee pain. However, pain has increased during hospitalization.    - Physical exam remarkable for pain with ROM bilaterally and warmth to R knee. No bony tenderness to palpation, no joint swelling or erythema noted, 5/5 strength bilaterally- although assessment limited 2/2 pain  - PT/OT consulted- recommending rehab  - ID concern for R knee infection, ortho surgery consulted for septic arthritis rule out  - X-ray knees bilaterally:   R Knee: Severe OA with significant erosion of joint space in medial, lateral, and patellofemoral compartments.  Significant osteophytosis. No signs of fracture or dislocation.  Calcific artery disease of popliteal vessel noted.    L Knee: Evidence of osteoarthritis with medial and lateral joint space narrowing with osteophytosis.  Medial compartment more affected than latera.  No evidence of fracture or dislocation. Calcific artery  "disease of popliteal vessel noted.   - Arthrocentesis performed of bilateral knees. Both knees had cell count <50k and were positive for pseudogout.  - Uric acid WNL  - no concern at this time for septic arthritis. no orthopedic follow up necessary  - Start colchicine 1.2 mg once then 0.6 mg daily  - Naproxen 250 bid, protonix 40 mg daily  - lidocaine patch prn    Cellulitis of left lower extremity  Leukocytosis  - redness, warmth and tenderness noted to LLE on admission; reported pain since scratching it at home a few days ago  - 1/4 SIRS on admission w/ WBC 14; afebrile & HDS  - started on IV vanc on 10/07  - pt spiked fever on 10/08- Tmax 101. WBC increased from 12.85 to 13.98. Added ceftriaxone for broader coverage. WBC increased further on 10/09, CTX discontinued and started zosyn. ID consulted and recommending discontinuing zosyn and treating with vanc + CTX  - transitioned to PO Doxycycline 100mg po bid and cefadroxil 1g po bid  (will continue at discharge until 10/20/2020 to complete 14d total of abx for LLE cellulitis)  - Follow up appointment in ID clinic on scheduled for 10/20/2020 at 02:00 PM    Other chest pain  -Patient c/o right sided chest pain that he describes as "like I pulled a muscle".  Not reproducible on exam.  -Troponin <0.006  - BNP 97  - EKG: no acute changes  - resolved    Hyponatremia  -Na 131 on admission.  - cc/hr  - improved to 133, continue to monitor with daily labs    Alcohol abuse  -Patient known to drink 10-12 beers/day.  Last ETOH just prior to syncopal episode, 4 cans on day of admission  -Monitor for s/s withdrawal  -CIWA Q8h  -Primary team to be notified if CIWA>10  -Thiamine, Folate, MVI daily  - PETH pending  - started on diazepam 5mg TID for withdrawal ppx, increased to 10 mg tid on 10/8 in setting of fever and increased confusion/agitation. No further signs or symptoms of withdrawal. Valium taper completed    Essential hypertension  -Continue home Norvasc, HCTZ, " metoprolol, and lasix      VTE Risk Mitigation (From admission, onward)         Ordered     heparin (porcine) injection 5,000 Units  Every 8 hours      10/07/20 0109     IP VTE HIGH RISK PATIENT  Once      10/07/20 0109     Place sequential compression device  Until discontinued      10/07/20 0109                Discharge Planning   SHANE: 10/15/2020     Code Status: Full Code   Is the patient medically ready for discharge?: Yes    Reason for patient still in hospital (select all that apply): Pending disposition  Discharge Plan A: Skilled Nursing Facility   Discharge Delays: None known at this time            Discussed with staff, Keira Velasco PA-C  Department of Hospital Medicine   [facility]

## 2020-10-14 NOTE — PLAN OF CARE
Problem: Occupational Therapy Goal  Goal: Occupational Therapy Goal  Description: Goals to be met by: 11-9-20    Patient will increase functional independence with ADLs by performing:    UE Dressing with Modified [place].  LE Dressing with Supervision.  Grooming while standing at sink with Supervision.  Toileting from toilet with Supervision for hygiene and clothing management.   Toilet transfer to toilet with Supervision.    Outcome: Ongoing, Progressing     OT goals remain appropriate.    Wesley Simpson, OT   10/14/2020

## 2020-10-14 NOTE — PT/OT/SLP PROGRESS
"Physical Therapy Treatment    Patient Name:  Roshan Rankin   MRN:  [id number]    Recommendations:     Discharge Recommendations:  nursing facility, skilled   Discharge Equipment Recommendations: (TBD pending progress)   Barriers to discharge: Inaccessible home and Decreased caregiver support    Assessment:     Roshan Rankin is a 75 y.o. male admitted with a medical diagnosis of Convulsion.  He presents with the following impairments/functional limitations:  weakness, impaired endurance, impaired self care skills, impaired functional mobilty, gait instability, impaired balance, decreased lower extremity function, decreased coordination, pain. Patient tolerated session well. He reports increased mobility capabilities this date and less R knee pain. He is still unsafe to return home at this time. Patient would continue to benefit from skilled PT services while in the hospital. At this time, upon d/c rec of SNF in order to progress towards an improved level of functional mobility independence.     Rehab Prognosis: Good; patient would benefit from acute skilled PT services to address these deficits and reach maximum level of function.    Recent Surgery: * No surgery found *      Plan:     During this hospitalization, patient to be seen 4 x/week to address the identified rehab impairments via gait training, therapeutic activities, therapeutic exercises, neuromuscular re-education and progress toward the following goals:    · Plan of Care Expires:  11/08/20    Subjective     Chief Complaint: weakness   Patient/Family Comments/goals: "I'm feeling better today."   Pain/Comfort:  · Pain Rating 1: 0/10  · Pain Rating Post-Intervention 1: 0/10      Objective:     Communicated with RN prior to session.  Patient found HOB elevated with telemetry, pulse ox (continuous), peripheral IV upon PT entry to room.     General Precautions: Standard, fall, seizure, aspiration   Orthopedic Precautions:N/A   Braces: N/A     Functional " Mobility:  · Bed Mobility:     · Rolling Left:  stand by assistance  · Scooting: stand by assistance  · Supine to Sit: minimum assistance  · Transfers:     · Sit to Stand:  maximal assistance and of 2 persons with rolling walker  · Gait: ~6 steps with MaxAx2 and HHA  ·  FFP, decreased foot clearance bilaterally, step-to gait pattern  ·  vc's for improving safety and safe decsent into chair   · Balance: sitting EOB - SBA; static standing - MaxAx2; dynamic standing - MaxAx2   · While standing - vc's to decreased Right lateral trunk lean       AM-PAC 6 CLICK MOBILITY  Turning over in bed (including adjusting bedclothes, sheets and blankets)?: 3  Sitting down on and standing up from a chair with arms (e.g., wheelchair, bedside commode, etc.): 2  Moving from lying on back to sitting on the side of the bed?: 3  Moving to and from a bed to a chair (including a wheelchair)?: 2  Need to walk in hospital room?: 2  Climbing 3-5 steps with a railing?: 1  Basic Mobility Total Score: 13       Therapeutic Activities and Exercises:  -Patient educated on the continued role and goal of PT  -Questions and concerns answered within the the PT scope of practice.   -White board updated in patient room to reflect level of assistance needed with nursing. RNx2 via stand pivot     Patient left up in chair with all lines intact, call button in reach, RN notified and wife present..    GOALS:   Multidisciplinary Problems     Physical Therapy Goals        Problem: Physical Therapy Goal    Goal Priority Disciplines Outcome Goal Variances Interventions   Physical Therapy Goal     PT, PT/OT Ongoing, Progressing     Description: Goals to be met by: 10/26/2020     Patient will increase functional independence with mobility by performin. Supine to sit with Contact Guard Assistance  2. Sit to supine with Contact Guard Assistance  3. Sit to stand transfer with Minimal Assistance  4. Bed to chair transfer with Minimal Assistance using LRAD.  5.  Gait  x 25 feet with Minimal Assistance using LRAD.    6. Lower extremity exercise program x30 reps per handout, with independence                     Time Tracking:     PT Received On: 10/14/20  PT Start Time: 0956     PT Stop Time: 1019  PT Total Time (min): 23 min     Billable Minutes: Gait Training 10 and Therapeutic Activity 13    Treatment Type: Treatment  PT/PTA: PT     PTA Visit Number: 0     Camila Sims, PT  10/14/2020

## 2020-10-14 NOTE — ASSESSMENT & PLAN NOTE
Knee pain, bilateral  Pt complaining of bilateral knee pain. He reports that the pain is chronic due to bilateral arthritis and is an 8/10 at home on a normal basis. He had 2 surgeries on his L knee many years ago after a football injury. He walks with a limp at baseline due to the L knee pain. However, pain has increased during hospitalization.    - Physical exam remarkable for pain with ROM bilaterally and warmth to R knee. No bony tenderness to palpation, no joint swelling or erythema noted, 5/5 strength bilaterally- although assessment limited 2/2 pain  - PT/OT consulted- recommending rehab  - ID concern for R knee infection, ortho surgery consulted for septic arthritis rule out  - X-ray knees bilaterally:   R Knee: Severe OA with significant erosion of joint space in medial, lateral, and patellofemoral compartments.  Significant osteophytosis. No signs of fracture or dislocation.  Calcific artery disease of popliteal vessel noted.    L Knee: Evidence of osteoarthritis with medial and lateral joint space narrowing with osteophytosis.  Medial compartment more affected than latera.  No evidence of fracture or dislocation. Calcific artery disease of popliteal vessel noted.   - Arthrocentesis performed of bilateral knees. Both knees had cell count <50k and were positive for pseudogout.  - Uric acid WNL  - no concern at this time for septic arthritis. no orthopedic follow up necessary  - Start colchicine 1.2 mg once then 0.6 mg daily  - Naproxen 250 bid, protonix 40 mg daily  - lidocaine patch prn

## 2020-10-15 VITALS
DIASTOLIC BLOOD PRESSURE: 72 MMHG | WEIGHT: 225 LBS | HEART RATE: 67 BPM | RESPIRATION RATE: 19 BRPM | HEIGHT: 70 IN | SYSTOLIC BLOOD PRESSURE: 134 MMHG | OXYGEN SATURATION: 97 % | TEMPERATURE: 98 F | BODY MASS INDEX: 32.21 KG/M2

## 2020-10-15 LAB
ANION GAP SERPL CALC-SCNC: 13 MMOL/L (ref 8–16)
ANISOCYTOSIS BLD QL SMEAR: SLIGHT
BASOPHILS # BLD AUTO: 0.05 K/UL (ref 0–0.2)
BASOPHILS NFR BLD: 0.6 % (ref 0–1.9)
BUN SERPL-MCNC: 9 MG/DL (ref 8–23)
CALCIUM SERPL-MCNC: 9.2 MG/DL (ref 8.7–10.5)
CHLORIDE SERPL-SCNC: 103 MMOL/L (ref 95–110)
CO2 SERPL-SCNC: 21 MMOL/L (ref 23–29)
CREAT SERPL-MCNC: 0.8 MG/DL (ref 0.5–1.4)
CREAT SERPL-MCNC: 0.8 MG/DL (ref 0.5–1.4)
DIFFERENTIAL METHOD: ABNORMAL
EOSINOPHIL # BLD AUTO: 0.4 K/UL (ref 0–0.5)
EOSINOPHIL NFR BLD: 4.8 % (ref 0–8)
ERYTHROCYTE [DISTWIDTH] IN BLOOD BY AUTOMATED COUNT: 12 % (ref 11.5–14.5)
EST. GFR  (AFRICAN AMERICAN): >60 ML/MIN/1.73 M^2
EST. GFR  (AFRICAN AMERICAN): >60 ML/MIN/1.73 M^2
EST. GFR  (NON AFRICAN AMERICAN): >60 ML/MIN/1.73 M^2
EST. GFR  (NON AFRICAN AMERICAN): >60 ML/MIN/1.73 M^2
GLUCOSE SERPL-MCNC: 81 MG/DL (ref 70–110)
HCT VFR BLD AUTO: 42.6 % (ref 40–54)
HGB BLD-MCNC: 14.8 G/DL (ref 14–18)
HYPOCHROMIA BLD QL SMEAR: ABNORMAL
IMM GRANULOCYTES # BLD AUTO: 0.06 K/UL (ref 0–0.04)
IMM GRANULOCYTES NFR BLD AUTO: 0.7 % (ref 0–0.5)
LYMPHOCYTES # BLD AUTO: 1.5 K/UL (ref 1–4.8)
LYMPHOCYTES NFR BLD: 18.8 % (ref 18–48)
MCH RBC QN AUTO: 32.2 PG (ref 27–31)
MCHC RBC AUTO-ENTMCNC: 34.7 G/DL (ref 32–36)
MCV RBC AUTO: 93 FL (ref 82–98)
MONOCYTES # BLD AUTO: 0.8 K/UL (ref 0.3–1)
MONOCYTES NFR BLD: 9.5 % (ref 4–15)
NEUTROPHILS # BLD AUTO: 5.4 K/UL (ref 1.8–7.7)
NEUTROPHILS NFR BLD: 65.6 % (ref 38–73)
NRBC BLD-RTO: 0 /100 WBC
OVALOCYTES BLD QL SMEAR: ABNORMAL
PHOSPHATIDYLETHANOL (PETH): 335 NG/ML
PLATELET # BLD AUTO: 196 K/UL (ref 150–350)
PMV BLD AUTO: 11.2 FL (ref 9.2–12.9)
POIKILOCYTOSIS BLD QL SMEAR: SLIGHT
POLYCHROMASIA BLD QL SMEAR: ABNORMAL
POTASSIUM SERPL-SCNC: 3.9 MMOL/L (ref 3.5–5.1)
RBC # BLD AUTO: 4.6 M/UL (ref 4.6–6.2)
SODIUM SERPL-SCNC: 137 MMOL/L (ref 136–145)
TB INDURATION 48 - 72 HR READ: 0 MM
WBC # BLD AUTO: 8.2 K/UL (ref 3.9–12.7)

## 2020-10-15 PROCEDURE — 36415 COLL VENOUS BLD VENIPUNCTURE: CPT

## 2020-10-15 PROCEDURE — 25000003 PHARM REV CODE 250: Performed by: PHYSICIAN ASSISTANT

## 2020-10-15 PROCEDURE — 63600175 PHARM REV CODE 636 W HCPCS: Performed by: NURSE PRACTITIONER

## 2020-10-15 PROCEDURE — 99239 PR HOSPITAL DISCHARGE DAY,>30 MIN: ICD-10-PCS | Mod: ,,, | Performed by: PHYSICIAN ASSISTANT

## 2020-10-15 PROCEDURE — 99239 HOSP IP/OBS DSCHRG MGMT >30: CPT | Mod: ,,, | Performed by: PHYSICIAN ASSISTANT

## 2020-10-15 PROCEDURE — 80048 BASIC METABOLIC PNL TOTAL CA: CPT

## 2020-10-15 PROCEDURE — 25000003 PHARM REV CODE 250: Performed by: NURSE PRACTITIONER

## 2020-10-15 PROCEDURE — 97110 THERAPEUTIC EXERCISES: CPT

## 2020-10-15 PROCEDURE — 97535 SELF CARE MNGMENT TRAINING: CPT

## 2020-10-15 PROCEDURE — 25000003 PHARM REV CODE 250: Performed by: HOSPITALIST

## 2020-10-15 PROCEDURE — 85025 COMPLETE CBC W/AUTO DIFF WBC: CPT

## 2020-10-15 RX ORDER — LANOLIN ALCOHOL/MO/W.PET/CERES
100 CREAM (GRAM) TOPICAL DAILY
Qty: 30 TABLET | Refills: 1 | Status: SHIPPED | OUTPATIENT
Start: 2020-10-15 | End: 2020-12-14

## 2020-10-15 RX ORDER — LIDOCAINE 50 MG/G
2 PATCH TOPICAL DAILY
Qty: 28 PATCH | Refills: 1 | Status: SHIPPED | OUTPATIENT
Start: 2020-10-15 | End: 2020-11-12

## 2020-10-15 RX ORDER — DOXYCYCLINE HYCLATE 100 MG
100 TABLET ORAL EVERY 12 HOURS
Qty: 10 TABLET | Refills: 0 | Status: SHIPPED | OUTPATIENT
Start: 2020-10-15 | End: 2020-10-20

## 2020-10-15 RX ORDER — FOLIC ACID 1 MG/1
1 TABLET ORAL DAILY
Qty: 30 TABLET | Refills: 11 | Status: SHIPPED | OUTPATIENT
Start: 2020-10-15 | End: 2022-07-27 | Stop reason: SDUPTHER

## 2020-10-15 RX ORDER — COLCHICINE 0.6 MG/1
0.6 TABLET ORAL DAILY
Qty: 3 TABLET | Refills: 0 | Status: SHIPPED | OUTPATIENT
Start: 2020-10-15 | End: 2022-07-27

## 2020-10-15 RX ORDER — PANTOPRAZOLE SODIUM 40 MG/1
40 TABLET, DELAYED RELEASE ORAL DAILY
Qty: 7 TABLET | Refills: 0 | Status: SHIPPED | OUTPATIENT
Start: 2020-10-15 | End: 2022-07-27

## 2020-10-15 RX ORDER — HYDROCHLOROTHIAZIDE 12.5 MG/1
12.5 CAPSULE ORAL DAILY
Qty: 30 CAPSULE | Refills: 11 | Status: SHIPPED | OUTPATIENT
Start: 2020-10-15 | End: 2022-07-27

## 2020-10-15 RX ORDER — CEFADROXIL 1000 MG/1
1 TABLET ORAL EVERY 12 HOURS
Qty: 10 TABLET | Refills: 0 | Status: SHIPPED | OUTPATIENT
Start: 2020-10-15 | End: 2020-10-20

## 2020-10-15 RX ORDER — NAPROXEN 250 MG/1
250 TABLET ORAL 2 TIMES DAILY WITH MEALS
Qty: 6 TABLET | Refills: 0 | Status: SHIPPED | OUTPATIENT
Start: 2020-10-15 | End: 2020-10-18

## 2020-10-15 RX ORDER — LIDOCAINE 50 MG/G
2 PATCH TOPICAL DAILY
Status: DISCONTINUED | OUTPATIENT
Start: 2020-10-15 | End: 2020-10-15 | Stop reason: HOSPADM

## 2020-10-15 RX ADMIN — PANTOPRAZOLE SODIUM 40 MG: 40 TABLET, DELAYED RELEASE ORAL at 08:10

## 2020-10-15 RX ADMIN — DOXYCYCLINE HYCLATE 100 MG: 100 TABLET, COATED ORAL at 08:10

## 2020-10-15 RX ADMIN — HYDROCHLOROTHIAZIDE 12.5 MG: 12.5 TABLET ORAL at 08:10

## 2020-10-15 RX ADMIN — THERA TABS 1 TABLET: TAB at 08:10

## 2020-10-15 RX ADMIN — Medication 100 MG: at 08:10

## 2020-10-15 RX ADMIN — FUROSEMIDE 40 MG: 40 TABLET ORAL at 08:10

## 2020-10-15 RX ADMIN — NAPROXEN 250 MG: 250 TABLET ORAL at 05:10

## 2020-10-15 RX ADMIN — NAPROXEN 250 MG: 250 TABLET ORAL at 07:10

## 2020-10-15 RX ADMIN — AMLODIPINE BESYLATE 10 MG: 10 TABLET ORAL at 08:10

## 2020-10-15 RX ADMIN — LIDOCAINE 2 PATCH: 50 PATCH TOPICAL at 11:10

## 2020-10-15 RX ADMIN — ASPIRIN 81 MG: 81 TABLET, COATED ORAL at 08:10

## 2020-10-15 RX ADMIN — FOLIC ACID 1 MG: 1 TABLET ORAL at 08:10

## 2020-10-15 RX ADMIN — HEPARIN SODIUM 5000 UNITS: 5000 INJECTION INTRAVENOUS; SUBCUTANEOUS at 05:10

## 2020-10-15 RX ADMIN — METOPROLOL TARTRATE 100 MG: 100 TABLET ORAL at 08:10

## 2020-10-15 RX ADMIN — HEPARIN SODIUM 5000 UNITS: 5000 INJECTION INTRAVENOUS; SUBCUTANEOUS at 01:10

## 2020-10-15 RX ADMIN — COLCHICINE 0.6 MG: 0.6 TABLET, FILM COATED ORAL at 08:10

## 2020-10-15 RX ADMIN — CEFADROXIL 1 G: 1 TABLET, FILM COATED ORAL at 08:10

## 2020-10-15 NOTE — PLAN OF CARE
Plan of care discussed with pt. Pt AOx4, ambulate w/ x1 assist. VS stable. No issues overnight. Pt plan to d/c to SNF today.  Pt denies CP, SOB, or pain/discomfort at this time.Pt free of injuries this shift.  All questions addressed.  Will continue to monitor.

## 2020-10-15 NOTE — PT/OT/SLP PROGRESS
Occupational Therapy   Treatment    Name: Roshan Rankin  MRN: [id number]  Admitting Diagnosis:  Convulsion       Recommendations:     Discharge Recommendations: nursing facility, skilled  Discharge Equipment Recommendations:  (TBD may need RW)  Barriers to discharge:       Assessment:     Roshan Rankin is a 75 y.o. male with a medical diagnosis of Convulsion.  He presents with deficits with endurance, functional mobility and higher level ADL tasks. Pt. Required Min A for functional mobility on this date . Pt. Making good improvements with endurance and level of functioning. Performance deficits affecting function are impaired endurance, impaired self care skills, impaired functional mobilty, weakness. Pt. Would benefit from continued oT services to maximize safety and I with ADL tasks  Rehab Prognosis:  Good; patient would benefit from acute skilled OT services to address these deficits and reach maximum level of function.       Plan:     Patient to be seen 4 x/week to address the above listed problems via self-care/home management, therapeutic activities, therapeutic exercises  · Plan of Care Expires: 11/09/20  · Plan of Care Reviewed with: patient    Subjective     Pain/Comfort:  · Pain Rating 1: 0/10  · Pain Rating Post-Intervention 1: 0/10    Objective:     Communicated with: nurse prior to session.  Patient found supine with telemetry, pulse ox (continuous), blood pressure cuff(supine in bed) upon OT entry to room.    General Precautions: Standard, aspiration, fall, seizure   Orthopedic Precautions:N/A   Braces: N/A     Occupational Performance:     Bed Mobility:    · Patient completed Supine to Sit with contact guard assistance   · With HOB elevated    Functional Mobility/Transfers:  · Patient completed Sit <> Stand Transfer with minimum assistance  with  rolling walker   · Patient completed Bed <> Chair Transfer using Stand Pivot technique with minimum assistance with rolling walker  · Functional Mobility: pt.  Ambulated to and from bathroom with RW and CGA/Min A    Activities of Daily Living:  · Grooming: contact guard assistance in stand at sink to brush teeth and wash face      [facility] 6 Click ADL: 16    Treatment & Education:  Pt. Engaged in BUE AROM there ex without weight x 2 sets 10 reps for all major planes of BUE motion  Pt. Educated on safety with transfers and ADL tasks  Pt. Educated on importance of therapy and OOB      Patient left up in chair with all lines intact, call button in reach and nurse notifiedEducation:      GOALS:   Multidisciplinary Problems     Occupational Therapy Goals        Problem: Occupational Therapy Goal    Goal Priority Disciplines Outcome Interventions   Occupational Therapy Goal     OT, PT/OT Ongoing, Progressing    Description: Goals to be met by: 10/26/2020    Patient will increase functional independence with ADLs by performing:    UE Dressing with Modified [place].  LE Dressing with Minimum Assistance with appropriate adaptive equipment as needed.  Grooming while standing with Contact Guard Assistance.  Toileting from bedside commode with Minimal Assistance for hygiene and clothing management.   Sit to stand transfer with Minimal Assistance with rolling walker.  Toilet transfer to bedside commode with Minimal Assistance with rolling walker.                     Time Tracking:     OT Date of Treatment: 10/15/20  OT Start Time: 1128  OT Stop Time: 1158  OT Total Time (min): 30 min    Billable Minutes:Self Care/Home Management 20  Therapeutic Exercise 10    JOSE Kyle  10/15/2020

## 2020-10-15 NOTE — PLAN OF CARE
SW sent updated clinicals to facility. Awaiting review and  admitting information (room and report).    Tere Taveras LMSW  Case Management Social Worker   [facility] [address]

## 2020-10-15 NOTE — PLAN OF CARE
10/15/20 1622   Post-Acute Status   Post-Acute Authorization Placement   Post-Acute Placement Status Set-up Complete       SW set pt transport for 5 PM. (w/c) *This is an estimated time. Pt to d/c to [facility] ([id number]).     Nurse call report to .    Pt wife informed of transfer.     No further needs.    Tere Taveras LMSW  Case Management Social Worker   [facility] [address]

## 2020-10-15 NOTE — PLAN OF CARE
Problem: Fall Injury Risk  Goal: Absence of Fall and Fall-Related Injury  Outcome: Adequate for Care Transition     Problem: Adult Inpatient Plan of Care  Goal: Plan of Care Review  Outcome: Adequate for Care Transition  Goal: Patient-Specific Goal (Individualization)  Outcome: Adequate for Care Transition  Goal: Absence of Hospital-Acquired Illness or Injury  Outcome: Adequate for Care Transition  Goal: Optimal Comfort and Wellbeing  Outcome: Adequate for Care Transition  Goal: Readiness for Transition of Care  Outcome: Adequate for Care Transition  Goal: Rounds/Family Conference  Outcome: Adequate for Care Transition     Problem: Infection  Goal: Infection Symptom Resolution  Outcome: Adequate for Care Transition     Problem: Skin Injury Risk Increased  Goal: Skin Health and Integrity  Outcome: Adequate for Care Transition

## 2020-10-15 NOTE — PLAN OF CARE
[facility]     Department of Hospital Medicine     [address] [place] [address]     [phone] after hours  [phone] fax       NURSING HOME ORDERS    10/15/2020    Admit to Nursing Home: Skilled Bed                                                Diagnoses:  Active Hospital Problems    Diagnosis  POA    *Convulsion [R56.9]  Yes     Priority: 1 - High    Cellulitis of left lower extremity [L03.116]  Yes     Priority: 2     Pseudogout involving multiple joints [M11.89]  Yes    New onset seizure [R56.9]  Yes    Bilateral pain of leg and foot [M79.604, M79.605, M79.671, M79.672]  Yes    Syncope [R55]  Yes    Other chest pain [R07.89]  Yes    Hyponatremia [E87.1]  Yes    Essential hypertension [I10]  Yes    Alcohol abuse [F10.10]  Yes      Resolved Hospital Problems   No resolved problems to display.       Patient is homebound due to:  Convulsion    Allergies:Review of patient's allergies indicates:  No Known Allergies    Vitals:      Every shift (Skilled Nursing patients)    Diet:  Cardiac    Acitivities:    - Up in a chair each morning as tolerated   - Ambulate with assistance to bathroom   - Scheduled walks once each shift (every 8 hours)   - May ambulate independently   - May use walker, cane, or self-propelled wheelchair      LABS:  Per facility protocol    Nursing Precautions:    - Monitor for symptoms of withdrawal-- s/p completion of valium taper   - Aspiration precautions:             - Total assistance with meals            -  Upright 90 degrees befor during and after meals             -  Suction at bedside          - Fall precautions per nursing home protocol   - Seizure precaution per shelter protocol   - Decubitus precautions:        -  for positioning   - Pressure reducing foam mattress   - Turn patient every two hours. Use wedge pillows to anchor patient    CONSULTS:     Physical Therapy to evaluate and treat     Occupational  Therapy to evaluate and treat      MISCELLANEOUS CARE:       Routine Skin for Bedridden Patients:  Apply moisture barrier cream to all    skin folds and wet areas in perineal area daily and after baths and                           all bowel movements.         Medications: Discontinue all previous medication orders, if any. See new list below.     Roshan Rankin   Home Medication Instructions FLEX:[id number]    Printed on:10/15/20 3217   Medication Information                      amlodipine (NORVASC) 10 MG tablet  Take 10 mg by mouth once daily.             aspirin (ECOTRIN) 81 MG EC tablet  Take 81 mg by mouth once daily.             cefadroxil (DURICEF) 1 gram tablet  Take 1 tablet (1 g total) by mouth every 12 (twelve) hours for 5 days. End date 10/20/20.             colchicine (COLCRYS) 0.6 mg tablet  Take 1 tablet (0.6 mg total) by mouth once daily. for 3 days             doxycycline (VIBRA-TABS) 100 MG tablet  Take 1 tablet (100 mg total) by mouth every 12 (twelve) hours for 5 days. End date 10/20/20.             folic acid (FOLVITE) 1 MG tablet  Take 1 tablet (1 mg total) by mouth once daily.             furosemide (LASIX) 40 MG tablet  Take 40 mg by mouth 2 (two) times a day.             hydroCHLOROthiazide (MICROZIDE) 12.5 mg capsule  Take 1 capsule (12.5 mg total) by mouth once daily.             latanoprost 0.005 % ophthalmic solution  1 drop once daily.             lidocaine (LIDODERM) 5 %  Place 2 patches onto the skin once daily. Remove & Discard patch within 12 hours or as directed by MD             metoprolol tartrate (LOPRESSOR) 100 MG tablet  Take 100 mg by mouth 2 (two) times daily.             multivitamin Tab  Take 1 tablet by mouth once daily.             naproxen (NAPROSYN) 250 MG tablet  Take 1 tablet (250 mg total) by mouth 2 (two) times daily with meals. for 3 days             pantoprazole (PROTONIX) 40 MG tablet  Take 1 tablet (40 mg total) by mouth once daily. for 7 days              thiamine 100 MG tablet  Take 1 tablet (100 mg total) by mouth once daily.                 Follow up visits:    - ID clinic on 10/20/20 at 2pm   - Ambulatory referral to orthopedics & PCP placed.      _________________________________  Loretta Robles PA-C  10/15/2020

## 2020-10-15 NOTE — NURSING
Discharge information given to patient and spouse. Report called to KARLEE Solis at [facility]

## 2020-10-16 ENCOUNTER — TELEPHONE (OUTPATIENT)
Dept: ORTHOPEDICS | Facility: CLINIC | Age: 75
End: 2020-10-16

## 2020-10-16 PROBLEM — F10.931 ALCOHOL WITHDRAWAL SEIZURE WITH DELIRIUM: Status: ACTIVE | Noted: 2020-10-16

## 2020-10-16 PROBLEM — R56.9 ALCOHOL WITHDRAWAL SEIZURE WITH DELIRIUM: Status: ACTIVE | Noted: 2020-10-16

## 2020-10-16 NOTE — DISCHARGE SUMMARY
"[facility] - ICU 14 [facility]  Discharge Summary      Patient Name: Roshan Rankin  MRN: [id number]  Admission Date: 10/6/2020  Hospital Length of Stay: 7 days  Discharge Date and Time: 10/15/2020  6:20 PM  Attending Physician: Dallas Mayorga MD   Discharging Provider: Loretta Robles PA-C  Primary Care Provider: Ben Brannon MD      HPI:   Roshan Rankin is a 75 y.o. male with a significant medical history of HTN, ETOH abuse (10-12 beers every day) who presents to the hospital complaining of syncopal episode.  HPI information gathered from review of the patient's medical record due to the patient being unaware of preceding events.  The patient's wife was inside the house when she heard a thud in the garage.  She went out and found the patient on his chest w/his eye closed and labored breathing.  She denies tremors or tonic clonic seizure like activity.  No bowel or urinary incontinence was reported.  The patient had blood in his mouth indicating he may have bitten his tongue.  He was not responsive to his wife's questions so she called 911.  The patient was brought to the ED for evaluation.  There was no reported history of seizures.   Shortly after arrival to the ED the patient began to return to baseline.  He did not recall falling or if he had any preceding symptoms.  The patient denied CP, SOB, HA, change in vision, dizziness, or N/V.  ED work up included labs that revealed a WBC 14.71, Na 131.  Other labs grossly normal.  Currently the patient is AA&O x3.  He is c/o right sided chest pain that he describes as "like I pulled a muscle".  He has no other complaints at this time.  Per his nurse, the patient's speed to respond has improved since his arrival in his room.  The patient is admitted to Hospital Medicine.    * No surgery found *      Hospital Course:   Mr. Rankin was admitted to hospital medicine service for evaluation of possible syncopal episode with convulsions, suspected " secondary to alcohol withdrawal. Witnessed seizure in ED, keppra loaded & started on 500 mg BID. CTH without acute abnormalities. Labs on admit notable for leukocytosis (14) and hyponatremia (131). No previous hx of seizures or similar episodes. Neuro consulted, suspect seizures likely due to chronic alcohol abuse provoked in setting of hyponatremia. Keppra discontinued.  MRI w w/o contrast showed generalized volume loss and moderate degree of chronic microvascular ischemic disease; no acute intracranial pathology. EEG with mild, generalized non-specific cerebral dysfunction. Neurology suspect alcohol withdrawal provoked seizure, no AEDs warranted. Patient successfully completed valium taper for alcohol withdrawal. Multivitamin, thiamine, & folate supplementation given throughout stay, will continue at discharge.     Hospital course complicated but LLE cellulitis, likely etiology of leukocytosis on admit. Started on IV Vanc. Bcx NGTD. Patient became febrile with increased WBC, added CTX. Suspect alcohol withdrawal contributing, valium increased with appropriate response. Transitioned patient from CTX to zosyn due to persistent leukocytosis. ID consulted, recommended resuming previous tx with vanc + CTX. Pt transitioned to oral cefadroxil & doxycycline on 10/12, leukocytosis resolved. Consulted ortho surgery for evaluation of bilateral knee pain to r/o septic arthritis. Joint fluid revealed pseudogout, started on colchicine and naproxen with improvement in symptoms. Pt also started on daily PPI for ulcer ppx while undergoing tx w/ NSAIDs.     PT/OT consulted, recommend SNF. Patient medically stable for discharge to [facility]. Discharged on cefadroxil & doxycycline x 5 days to complete 14 day course (last dose 10/20). Recommend daily probiotics while undergoing abx treatment, rx provided. Follow up appt w/ ID scheduled for 10/20/2020. Ambulatory referral to orthopedics and internal medicine to establish care  with PCP placed. Patient verbalized understanding. All questions and concerns addressed.     Consults:   Consults (From admission, onward)        Status Ordering Provider     Inpatient consult to Infectious Diseases  Once     Provider:  (Not yet assigned)    Completed LARA GUERRERO     Inpatient consult to Neurology  Once     Provider:  (Not yet assigned)    Completed FELIX SERRANO     Inpatient consult to Orthopedic Surgery  Once     Provider:  (Not yet assigned)    Completed LARA GUERRERO          * Convulsion  Syncope  Alcohol abuse  Alcohol withdrawal seizure with delirium  Roshan Rankin is a 75 y.o. male with a significant medical history of HTN, ETOH abuse (10-12 beers every day) who presents to the hospital complaining of syncopal episode w/ possible seizure. Pt and wife did say he drank less alcohol on day of admission (~4 beers all day), but denies recent cut back in alcohol consumption.    - witnessed seizure in ED, keppra loaded then started on 500 mg BID.   - afebrile w/ leukocytosis to 14 on admit  - Na 131, suspect contributing to sxs  - EKG w/o acute ischemic changes  - Echo with normal systolic & diastolic function, EF 65%  - CTH negative  - Neuro consulted, suspect cluster of seizures likely due to chronic alcohol abuse, provoked in the setting of hyponatremia   - MRI w w/o contrast: showed generalized volume loss and moderate degree of chronic microvascular ischemic disease. Small R thalamic lacunar infarct. Slight asymmetrical volume loss in the mesial temporal lobes R slightly greater than L. No acute intracranial pathology.   - keppra discontinued   - 24 hr vEEG showed mild, generalized, non-specific cerebral dysfunction.   - Neurology suspect alcohol withdrawal provoked seizure  - no AEDs warranted  - started valium taper for alcohol withdrawal w/ dose adjustments as indicated   - CIWA protocol as noted below  - Seizure/aspiration precautions  - neuro checks q4h  -  successful completion valium taper, no further syncope/seizure episodes throughout stay  - pt remained HDS  - ambulatory referral to internal medicine to establish care w/ PCP placed at d/c    Cellulitis of left lower extremity  Leukocytosis  - redness, warmth and tenderness noted to LLE on admission; reported pain since scratching it at home a few days ago  - 1/4 SIRS on admission w/ WBC 14; afebrile & HDS  - started on IV vanc   - febrile with increase in leukocytosis (10/8), added CTX  - suspect alcohol withdrawal contributing, increased valium dose  - WBC increased further on 10/09, CTX discontinued and started zosyn.   - ID consulted, recommend d/c zosyn and restart vanc + CTX  - leukocytosis resolved  - transitioned to PO Doxycycline 100mg BID & PO cefadroxil 1g BID  - continued at d/c x 5 days until 10/20/2020 to complete 14d total course  - Follow up appointment in ID clinic on scheduled for 10/20/2020 at 02:00 PM    Pseudogout involving multiple joints  Knee pain, bilateral  Pt complaining of bilateral knee pain. He reports that the pain is chronic due to bilateral arthritis and is an 8/10 at home on a normal basis. He had 2 surgeries on his L knee many years ago after a football injury. He walks with a limp at baseline due to the L knee pain. However, pain has increased during hospitalization.    - Physical exam remarkable for pain with ROM bilaterally and warmth to R knee. No bony tenderness to palpation, no joint swelling or erythema noted, 5/5 strength bilaterally- although assessment limited 2/2 pain  - PT/OT consulted- recommending rehab  - Ortho surgery consulted for septic arthritis rule out  - X-ray knees bilaterally:   R Knee: Severe OA with significant erosion of joint space in medial, lateral, and patellofemoral compartments.  Significant osteophytosis. No signs of fracture or dislocation.  Calcific artery disease of popliteal vessel noted.    L Knee: Evidence of osteoarthritis with medial and  "lateral joint space narrowing with osteophytosis.  Medial compartment more affected than latera.  No evidence of fracture or dislocation. Calcific artery disease of popliteal vessel noted.   - Arthrocentesis performed to b/l knees, positive for pseudogout w/ cell count <50k   - Uric acid WNL  - no concern at this time for septic arthritis. no orthopedic follow up necessary  - Started colchicine & naproxen w/ improvement in sxs; will provide rx at d/c to complete 7 day couse  - protonix 40 mg daily for ulcer ppx  - lidocaine patch prn  - pt requests follow up w/ ortho for possible knee replacement, amb ref placed    Other chest pain  -Patient c/o right sided chest pain that he describes as "like I pulled a muscle".  Not reproducible on exam.  -Troponin <0.006  - BNP 97  - EKG: no acute changes  - resolved    Hyponatremia  -Na 131 on admission; suspect contributing to sxs  - given  cc/hr with improvement  - see above    Essential hypertension  -Continue home Norvasc, HCTZ, metoprolol, and lasix      Final Active Diagnoses:    Diagnosis Date Noted POA    PRINCIPAL PROBLEM:  Convulsion [R56.9] 10/07/2020 Yes    Cellulitis of left lower extremity [L03.116] 10/07/2020 Yes    Alcohol withdrawal seizure with delirium [F10.231, R56.9] 10/16/2020 Yes    Pseudogout involving multiple joints [M11.89] 10/11/2020 Yes    Bilateral pain of leg and foot [M79.604, M79.605, M79.671, M79.672] 10/08/2020 Yes    Syncope [R55] 10/07/2020 Yes    Other chest pain [R07.89] 10/07/2020 Yes    Hyponatremia [E87.1] 02/29/2016 Yes    Essential hypertension [I10] 02/28/2016 Yes    Alcohol abuse [F10.10] 02/28/2016 Yes      Problems Resolved During this Admission:       Discharged Condition: stable    Disposition: Skilled Nursing Facility    Follow Up:    Patient Instructions:      Ambulatory referral/consult to Internal Medicine   Standing Status: Future   Referral Priority: Routine Referral Type: Consultation   Referral Reason: " Specialty Services Required   Requested Specialty: Internal Medicine   Number of Visits Requested: 1     Ambulatory referral/consult to Orthopedics   Standing Status: Future   Referral Priority: Routine Referral Type: Consultation   Requested Specialty: Orthopedic Surgery   Number of Visits Requested: 1     Diet Cardiac     Notify your health care provider if you experience any of the following:  temperature >100.4     Notify your health care provider if you experience any of the following:  persistent nausea and vomiting or diarrhea     Notify your health care provider if you experience any of the following:  severe uncontrolled pain     Notify your health care provider if you experience any of the following:  redness, tenderness, or signs of infection (pain, swelling, redness, odor or green/yellow discharge around incision site)     Notify your health care provider if you experience any of the following:  difficulty breathing or increased cough     Notify your health care provider if you experience any of the following:  severe persistent headache     Notify your health care provider if you experience any of the following:  worsening rash     Notify your health care provider if you experience any of the following:  persistent dizziness, light-headedness, or visual disturbances     Notify your health care provider if you experience any of the following:  increased confusion or weakness     Activity as tolerated       Significant Diagnostic Studies: Labs: All labs within the past 24 hours have been reviewed    Pending Diagnostic Studies:     None         Medications:  Reconciled Home Medications:      Medication List      START taking these medications    cefadroxil 1 gram tablet  Commonly known as: DURICEF  Take 1 tablet (1 g total) by mouth every 12 (twelve) hours. End date 10/20/20. for 5 days     colchicine 0.6 mg tablet  Commonly known as: COLCRYS  Take 1 tablet (0.6 mg total) by mouth once daily. for 3 days      doxycycline 100 MG tablet  Commonly known as: VIBRA-TABS  Take 1 tablet (100 mg total) by mouth every 12 (twelve) hours. End date 10/20/20. for 5 days     folic acid 1 MG tablet  Commonly known as: FOLVITE  Take 1 tablet (1 mg total) by mouth once daily.     lidocaine 5 %  Commonly known as: LIDODERM  Place 2 patches onto the skin once daily. Remove & Discard patch within 12 hours or as directed by MD     multivitamin Tab  Take 1 tablet by mouth once daily.     naproxen 250 MG tablet  Commonly known as: NAPROSYN  Take 1 tablet (250 mg total) by mouth 2 (two) times daily with meals. for 3 days     pantoprazole 40 MG tablet  Commonly known as: PROTONIX  Take 1 tablet (40 mg total) by mouth once daily. for 7 days     thiamine 100 MG tablet  Take 1 tablet (100 mg total) by mouth once daily.        CONTINUE taking these medications    amLODIPine 10 MG tablet  Commonly known as: NORVASC  Take 10 mg by mouth once daily.     aspirin 81 MG EC tablet  Commonly known as: ECOTRIN  Take 81 mg by mouth once daily.     furosemide 40 MG tablet  Commonly known as: LASIX  Take 40 mg by mouth 2 (two) times a day.     hydroCHLOROthiazide 12.5 mg capsule  Commonly known as: MICROZIDE  Take 1 capsule (12.5 mg total) by mouth once daily.     latanoprost 0.005 % ophthalmic solution  1 drop once daily.     metoprolol tartrate 100 MG tablet  Commonly known as: LOPRESSOR  Take 100 mg by mouth 2 (two) times daily.        STOP taking these medications    omeprazole 40 MG capsule  Commonly known as: PRILOSEC            Indwelling Lines/Drains at time of discharge:   Lines/Drains/Airways     None                 Time spent on the discharge of patient: 32 minutes  Patient was seen and examined on the date of discharge and determined to be suitable for discharge.         Loretta Robles PA-C  Department of Hospital Medicine  [facility] - ICU 14 [facility]

## 2020-10-16 NOTE — ASSESSMENT & PLAN NOTE
Leukocytosis  - redness, warmth and tenderness noted to LLE on admission; reported pain since scratching it at home a few days ago  - 1/4 SIRS on admission w/ WBC 14; afebrile & HDS  - started on IV vanc   - febrile with increase in leukocytosis (10/8), added CTX  - suspect alcohol withdrawal contributing, increased valium dose  - WBC increased further on 10/09, CTX discontinued and started zosyn.   - ID consulted, recommend d/c zosyn and restart vanc + CTX  - leukocytosis resolved  - transitioned to PO Doxycycline 100mg BID & PO cefadroxil 1g BID  - continued at d/c x 5 days until 10/20/2020 to complete 14d total course  - Follow up appointment in ID clinic on scheduled for 10/20/2020 at 02:00 PM

## 2020-10-16 NOTE — ASSESSMENT & PLAN NOTE
Resolved  Syncope  Alcohol abuse  Alcohol withdrawal seizure with delirium  Roshan Rankin is a 75 y.o. male with a significant medical history of HTN, ETOH abuse (10-12 beers every day) who presents to the hospital complaining of syncopal episode w/ possible seizure. Pt and wife did say he drank less alcohol on day of admission (~4 beers all day), but denies recent cut back in alcohol consumption.    - witnessed seizure in ED, keppra loaded then started on 500 mg BID.   - afebrile w/ leukocytosis to 14 on admit  - Na 131, suspect contributing to sxs  - EKG w/o acute ischemic changes  - Echo with normal systolic & diastolic function, EF 65%  - CTH negative  - Neuro consulted, suspect cluster of seizures likely due to chronic alcohol abuse, provoked in the setting of hyponatremia   - MRI w w/o contrast: showed generalized volume loss and moderate degree of chronic microvascular ischemic disease. Small R thalamic lacunar infarct. Slight asymmetrical volume loss in the mesial temporal lobes R slightly greater than L. No acute intracranial pathology.   - keppra discontinued   - 24 hr vEEG showed mild, generalized, non-specific cerebral dysfunction.   - Neurology suspect alcohol withdrawal provoked seizure, no AEDs warranted  - started valium taper for alcohol withdrawal w/ dose adjustments as indicated   - CIWA protocol as noted below  - Seizure/aspiration precautions  - neuro checks q4h  - successful completion valium taper, no further syncope/seizure episodes throughout stay  - pt remained HDS  - ambulatory referral to internal medicine to establish care w/ PCP placed at d/c

## 2020-10-16 NOTE — PLAN OF CARE
Patient discharged to [facility].   set up transportation.    Future Appointments   Date Time Provider Department Center   10/20/2020  2:00 PM Filiberto Chris Jr., PA [facility] ID [facility]       to follow for discharge planning needs.  DENISE Zavala RN  Case Management  [id number]        10/16/20 0838   Final Note   Assessment Type Final Discharge Note   Anticipated Discharge Disposition SNF  ([facility])   Hospital Follow Up  Appt(s) scheduled? Yes   Discharge plans and expectations educations in teach back method with documentation complete? Yes   Post-Acute Status   Post-Acute Authorization Placement   Post-Acute Placement Status Set-up Complete   Discharge Delays None known at this time

## 2020-10-16 NOTE — TELEPHONE ENCOUNTER
I called the patient today regarding his referral to [facility]. I left a message for the patient to call me back. I left my name and phone number.

## 2020-10-16 NOTE — ASSESSMENT & PLAN NOTE
Knee pain, bilateral  Pt complaining of bilateral knee pain. He reports that the pain is chronic due to bilateral arthritis and is an 8/10 at home on a normal basis. He had 2 surgeries on his L knee many years ago after a football injury. He walks with a limp at baseline due to the L knee pain. However, pain has increased during hospitalization.    - Physical exam remarkable for pain with ROM bilaterally and warmth to R knee. No bony tenderness to palpation, no joint swelling or erythema noted, 5/5 strength bilaterally- although assessment limited 2/2 pain  - PT/OT consulted- recommending rehab  - ID concern for R knee infection, ortho surgery consulted for septic arthritis rule out  - X-ray knees bilaterally:   R Knee: Severe OA with significant erosion of joint space in medial, lateral, and patellofemoral compartments.  Significant osteophytosis. No signs of fracture or dislocation.  Calcific artery disease of popliteal vessel noted.    L Knee: Evidence of osteoarthritis with medial and lateral joint space narrowing with osteophytosis.  Medial compartment more affected than latera.  No evidence of fracture or dislocation. Calcific artery disease of popliteal vessel noted.   - Arthrocentesis performed to b/l knees, positive for pseudogout w/ cell count <50k   - Uric acid WNL  - no concern at this time for septic arthritis. no orthopedic follow up necessary  - Started colchicine & naproxen w/ improvement in sxs; will provide rx at d/c to complete 7 day couse  - protonix 40 mg daily for ulcer ppx  - lidocaine patch prn  - pt requests follow up w/ ortho for possible knee replacement, amb ref placed

## 2020-10-19 LAB
BACTERIA SPEC ANAEROBE CULT: NORMAL
BACTERIA SPEC ANAEROBE CULT: NORMAL

## 2020-10-24 ENCOUNTER — PATIENT MESSAGE (OUTPATIENT)
Dept: INFECTIOUS DISEASES | Facility: CLINIC | Age: 75
End: 2020-10-24

## 2020-10-27 ENCOUNTER — PATIENT MESSAGE (OUTPATIENT)
Dept: HEPATOLOGY | Facility: HOSPITAL | Age: 75
End: 2020-10-27

## 2020-10-28 ENCOUNTER — PATIENT MESSAGE (OUTPATIENT)
Dept: INFECTIOUS DISEASES | Facility: CLINIC | Age: 75
End: 2020-10-28

## 2020-11-05 ENCOUNTER — PATIENT MESSAGE (OUTPATIENT)
Dept: INFECTIOUS DISEASES | Facility: CLINIC | Age: 75
End: 2020-11-05

## 2020-11-09 LAB
FUNGUS SPEC CULT: NORMAL
FUNGUS SPEC CULT: NORMAL

## 2020-12-06 ENCOUNTER — OFFICE VISIT (OUTPATIENT)
Dept: URGENT CARE | Facility: CLINIC | Age: 75
End: 2020-12-06
Payer: MEDICARE

## 2020-12-06 VITALS
HEART RATE: 70 BPM | BODY MASS INDEX: 31.55 KG/M2 | DIASTOLIC BLOOD PRESSURE: 80 MMHG | HEIGHT: 69 IN | WEIGHT: 213 LBS | RESPIRATION RATE: 16 BRPM | TEMPERATURE: 98 F | SYSTOLIC BLOOD PRESSURE: 134 MMHG | OXYGEN SATURATION: 97 %

## 2020-12-06 DIAGNOSIS — U07.1 COVID-19 VIRUS DETECTED: ICD-10-CM

## 2020-12-06 DIAGNOSIS — Z20.822 ENCOUNTER FOR LABORATORY TESTING FOR COVID-19 VIRUS: Primary | ICD-10-CM

## 2020-12-06 DIAGNOSIS — U07.1 COVID-19 VIRUS INFECTION: ICD-10-CM

## 2020-12-06 DIAGNOSIS — R05.9 COUGH: ICD-10-CM

## 2020-12-06 LAB
CTP QC/QA: YES
SARS-COV-2 RDRP RESP QL NAA+PROBE: POSITIVE

## 2020-12-06 PROCEDURE — U0002: ICD-10-PCS | Mod: QW,S$GLB,, | Performed by: NURSE PRACTITIONER

## 2020-12-06 PROCEDURE — U0002 COVID-19 LAB TEST NON-CDC: HCPCS | Mod: QW,S$GLB,, | Performed by: NURSE PRACTITIONER

## 2020-12-06 PROCEDURE — 99203 OFFICE O/P NEW LOW 30 MIN: CPT | Mod: S$GLB,,, | Performed by: NURSE PRACTITIONER

## 2020-12-06 PROCEDURE — 99203 PR OFFICE/OUTPT VISIT, NEW, LEVL III, 30-44 MIN: ICD-10-PCS | Mod: S$GLB,,, | Performed by: NURSE PRACTITIONER

## 2020-12-06 RX ORDER — ROSUVASTATIN CALCIUM 20 MG/1
20 TABLET, COATED ORAL NIGHTLY
COMMUNITY
Start: 2020-11-19

## 2020-12-06 RX ORDER — OMEPRAZOLE 40 MG/1
40 CAPSULE, DELAYED RELEASE ORAL DAILY
COMMUNITY
Start: 2020-11-12

## 2020-12-06 RX ORDER — METOPROLOL SUCCINATE 100 MG/1
TABLET, EXTENDED RELEASE ORAL
COMMUNITY
Start: 2020-09-23 | End: 2022-07-27 | Stop reason: DRUGHIGH

## 2020-12-06 RX ORDER — ALBUTEROL SULFATE 90 UG/1
2 AEROSOL, METERED RESPIRATORY (INHALATION) EVERY 6 HOURS PRN
Qty: 18 G | Refills: 0 | Status: SHIPPED | OUTPATIENT
Start: 2020-12-06 | End: 2022-07-27

## 2020-12-06 RX ORDER — SPIRONOLACTONE 25 MG/1
25 TABLET ORAL DAILY
COMMUNITY
Start: 2020-11-20 | End: 2022-07-28

## 2020-12-07 NOTE — PATIENT INSTRUCTIONS
Your test was POSITIVE for COVID-19 (coronavirus).       Please isolate yourself at home.  You may leave home and/or return to work once the following conditions are met:    If you were not hospitalized and are not severely immunocompromised*:   More than 10 days since symptoms first appeared AND   More than 24 hours fever free without medications AND   Symptoms have improved     If you were hospitalized OR are severely immunocompromised*:   More than 20 days since symptoms first appeared   More than 24 hours fever free without medications   Symptoms have improved    If you had no symptoms but tested positive:   More than 10 days since the date of the first positive test (20 days if severely immunocompromised).   If you develop symptoms, then use the guidelines above.     *Definition of severely immunocompromised:  - Current chemotherapy for cancer  - Untreated HIV with CD4 count less than 200  - Combined primary immunodeficiency disorder  - Prednisone more than 20 mg per day for more than 14 days  - Post-transplant patients    Additional instructions:   Separate yourself from other people and animals in your home.   Call ahead before visiting your doctor.   Wear a facemask when around others.   Cover your coughs and sneezes.   Wash your hands often with soap and water; hand  can be used, too.   Avoid sharing personal household items.   Wipe down surfaces used daily.   Monitor your symptoms. Seek prompt medical attention if your illness is worsening (e.g., difficulty breathing).    Before seeking care, call your healthcare provider.   If you have a medical emergency and need to call 911, notify the dispatch personnel that you have, or are being evaluated for COVID-19. If possible, put on a facemask before emergency medical services arrive.        Contact Tracing    As one of the next steps, you will receive a call or text from the [place] Department of Health ([facility]) COVID-19 Tracing Team.  See the contact information below so you know not to ignore the health departments call. It is important that you contact them back immediately so they can help.      Contact Tracer Number:  [phone]  Caller ID for most carriers: [place] Dept Health     What is contact tracing?  · Contact tracing is a process that helps identify everyone who has been in close contact with an infected person. Contact tracers let those people know they may have been exposed and guide them on next steps. Confidentiality is important for everyone; no one will be told who may have exposed them to the virus.  · Your involvement is important. The more we know about where and how this virus is spreading, the better chance we have at stopping it from spreading further.  What does exposure mean?  · Exposure means you have been within 6 feet for more than 15 minutes with a person who has or had COVID-19.  What kind of questions do the contact tracers ask?  · A contact tracer will confirm your basic contact information including name, address, phone number, and next of kin, as well as asking about any symptoms you may have had. Theyll also ask you how you think you may have gotten sick, such as places where you may have been exposed to the virus, and people you were with. Those names will never be shared with anyone outside of that call, and will only be used to help trace and stop the spread of the virus.   I have privacy concerns. How will the state use my information?  · Your privacy about your health is important. All calls are completed using call centers that use the appropriate health privacy protection measures (HIPAA compliance), meaning that your patient information is safe. No one will ever ask you any questions related to immigration status. Your health comes first.   Do I have to participate?  · You do not have to participate, but we strongly encourage you to. Contact tracing can help us catch and control new outbreaks as  theyre developing to keep your friends and family safe.   What if I dont hear from anyone?  · If you dont receive a call within 24 hours, you can call the number above right away to inquire about your status. That line is open from 8:00 am - 8:00 p.m., 7 days a week.  Contact tracing saves lives! Together, we have the power to beat this virus and keep our loved ones and neighbors safe.    For more information see CDC link below.      https://www.cdc.gov/coronavirus/2019-ncov/hcp/guidance-prevent-spread.html#precautions        Sources:  CDC, [facility] and [facility]           Sincerely,     Arturo Angeles III, NP

## 2020-12-07 NOTE — PROGRESS NOTES
"Subjective:       Patient ID: Roshan Rankin is a 75 y.o. male.    Vitals:  height is 5' 9" (1.753 m) and weight is 96.6 kg (213 lb). His temperature is 97.9 °F (36.6 °C). His blood pressure is 134/80 and his pulse is 70. His respiration is 16 and oxygen saturation is 97%.     Chief Complaint: URI    2-3 day of cough and congestion.  No fever or chills.  No nausea, vomiting or diarrhea. No anosmia or ageusia.    Daughters are sick contacts ( COVID positive).    URI   This is a new problem. The current episode started yesterday. The problem has been unchanged. There has been no fever. Associated symptoms include congestion, coughing and rhinorrhea. Pertinent negatives include no abdominal pain, chest pain, diarrhea, dysuria, ear pain, headaches, joint pain, joint swelling, nausea, neck pain, plugged ear sensation, rash, sinus pain, sneezing, sore throat, swollen glands, vomiting or wheezing. He has tried nothing for the symptoms.       Constitution: Negative for chills, sweating, fatigue and fever.   HENT: Positive for congestion. Negative for ear pain, sinus pain, sinus pressure, sore throat and voice change.    Neck: Negative for neck pain and painful lymph nodes.   Cardiovascular: Negative for chest pain.   Eyes: Negative for eye redness.   Respiratory: Positive for cough and sputum production. Negative for chest tightness, bloody sputum, COPD, shortness of breath, stridor, wheezing and asthma.    Gastrointestinal: Negative for abdominal pain, nausea, vomiting and diarrhea.   Genitourinary: Negative for dysuria.   Musculoskeletal: Negative for muscle ache.   Skin: Negative for rash.   Allergic/Immunologic: Negative for seasonal allergies, asthma and sneezing.   Neurological: Negative for headaches.   Hematologic/Lymphatic: Negative for swollen lymph nodes.       Objective:      Physical Exam   Constitutional: He is oriented to person, place, and time. He appears well-developed. He is cooperative.  Non-toxic " appearance. He does not appear ill. No distress.   HENT:   Head: Normocephalic and atraumatic.   Ears:   Right Ear: Hearing, tympanic membrane, external ear and ear canal normal.   Left Ear: Hearing, tympanic membrane, external ear and ear canal normal.   Nose: Nose normal. No mucosal edema, rhinorrhea or nasal deformity. No epistaxis.   Mouth/Throat: Mucous membranes are normal.   Eyes: Conjunctivae and lids are normal. No scleral icterus.   Neck: Trachea normal, full passive range of motion without pain and phonation normal. Neck supple. No neck rigidity. No edema and no erythema present.   Cardiovascular: Normal rate, regular rhythm, normal heart sounds and normal pulses.   Pulmonary/Chest: Effort normal and breath sounds normal. No stridor. No respiratory distress. He has no decreased breath sounds. He has no wheezes. He has no rhonchi. He has no rales.   Abdominal: Normal appearance.   Musculoskeletal: Normal range of motion.         General: No deformity.   Neurological: He is alert and oriented to person, place, and time. He exhibits normal muscle tone. Coordination normal.   Skin: Skin is warm, dry, intact, not diaphoretic and not pale. Psychiatric: His speech is normal and behavior is normal. Judgment and thought content normal.   Nursing note and vitals reviewed.    Results for orders placed or performed in visit on 12/06/20   POCT COVID-19 Rapid Screening   Result Value Ref Range    POC Rapid COVID Positive (A) Negative     Acceptable Yes            Assessment:       1. Encounter for laboratory testing for COVID-19 virus    2. Cough    3. COVID-19 virus infection        Plan:       Labs ordered at this visit reviewed.     Encounter for laboratory testing for COVID-19 virus  -     POCT COVID-19 Rapid Screening    Cough  -     albuterol (PROVENTIL HFA) 90 mcg/actuation inhaler; Inhale 2 puffs into the lungs every 6 (six) hours as needed for Wheezing or Shortness of Breath. Rescue  Dispense:  18 g; Refill: 0    COVID-19 virus infection      Patient Instructions   Your test was POSITIVE for COVID-19 (coronavirus).       Please isolate yourself at home.  You may leave home and/or return to work once the following conditions are met:    If you were not hospitalized and are not severely immunocompromised*:   More than 10 days since symptoms first appeared AND   More than 24 hours fever free without medications AND   Symptoms have improved     If you were hospitalized OR are severely immunocompromised*:   More than 20 days since symptoms first appeared   More than 24 hours fever free without medications   Symptoms have improved    If you had no symptoms but tested positive:   More than 10 days since the date of the first positive test (20 days if severely immunocompromised).   If you develop symptoms, then use the guidelines above.     *Definition of severely immunocompromised:  - Current chemotherapy for cancer  - Untreated HIV with CD4 count less than 200  - Combined primary immunodeficiency disorder  - Prednisone more than 20 mg per day for more than 14 days  - Post-transplant patients    Additional instructions:   Separate yourself from other people and animals in your home.   Call ahead before visiting your doctor.   Wear a facemask when around others.   Cover your coughs and sneezes.   Wash your hands often with soap and water; hand  can be used, too.   Avoid sharing personal household items.   Wipe down surfaces used daily.   Monitor your symptoms. Seek prompt medical attention if your illness is worsening (e.g., difficulty breathing).    Before seeking care, call your healthcare provider.   If you have a medical emergency and need to call 911, notify the dispatch personnel that you have, or are being evaluated for COVID-19. If possible, put on a facemask before emergency medical services arrive.        Contact Tracing    As one of the next steps, you will receive a call or  text from the [place] Department of Health ([facility]) COVID-19 Tracing Team. See the contact information below so you know not to ignore the health departments call. It is important that you contact them back immediately so they can help.      Contact Tracer Number:  [phone]  Caller ID for most carriers: [place] Dept Health     What is contact tracing?  · Contact tracing is a process that helps identify everyone who has been in close contact with an infected person. Contact tracers let those people know they may have been exposed and guide them on next steps. Confidentiality is important for everyone; no one will be told who may have exposed them to the virus.  · Your involvement is important. The more we know about where and how this virus is spreading, the better chance we have at stopping it from spreading further.  What does exposure mean?  · Exposure means you have been within 6 feet for more than 15 minutes with a person who has or had COVID-19.  What kind of questions do the contact tracers ask?  · A contact tracer will confirm your basic contact information including name, address, phone number, and next of kin, as well as asking about any symptoms you may have had. Theyll also ask you how you think you may have gotten sick, such as places where you may have been exposed to the virus, and people you were with. Those names will never be shared with anyone outside of that call, and will only be used to help trace and stop the spread of the virus.   I have privacy concerns. How will the state use my information?  · Your privacy about your health is important. All calls are completed using call centers that use the appropriate health privacy protection measures (HIPAA compliance), meaning that your patient information is safe. No one will ever ask you any questions related to immigration status. Your health comes first.   Do I have to participate?  · You do not have to participate, but we strongly encourage you  to. Contact tracing can help us catch and control new outbreaks as theyre developing to keep your friends and family safe.   What if I dont hear from anyone?  · If you dont receive a call within 24 hours, you can call the number above right away to inquire about your status. That line is open from 8:00 am - 8:00 p.m., 7 days a week.  Contact tracing saves lives! Together, we have the power to beat this virus and keep our loved ones and neighbors safe.    For more information see CDC link below.      https://www.cdc.gov/coronavirus/2019-ncov/hcp/guidance-prevent-spread.html#precautions        Sources:  [facility], [facility] and [facility]           Sincerely,     Arturo Angeles III, NP

## 2020-12-13 LAB
ACID FAST MOD KINY STN SPEC: NORMAL
ACID FAST MOD KINY STN SPEC: NORMAL
MYCOBACTERIUM SPEC QL CULT: NORMAL
MYCOBACTERIUM SPEC QL CULT: NORMAL

## 2021-04-16 ENCOUNTER — PATIENT MESSAGE (OUTPATIENT)
Dept: RESEARCH | Facility: HOSPITAL | Age: 76
End: 2021-04-16

## 2022-07-27 ENCOUNTER — HOSPITAL ENCOUNTER (OUTPATIENT)
Facility: HOSPITAL | Age: 77
Discharge: HOME | End: 2022-07-28
Attending: EMERGENCY MEDICINE | Admitting: INTERNAL MEDICINE
Payer: MEDICARE

## 2022-07-27 DIAGNOSIS — F10.20 ALCOHOL USE DISORDER, MODERATE, DEPENDENCE: ICD-10-CM

## 2022-07-27 DIAGNOSIS — I10 PRIMARY HYPERTENSION: ICD-10-CM

## 2022-07-27 DIAGNOSIS — F10.939 ALCOHOL WITHDRAWAL SYNDROME WITH COMPLICATION: ICD-10-CM

## 2022-07-27 DIAGNOSIS — F10.931 ALCOHOL WITHDRAWAL SEIZURE WITH DELIRIUM: ICD-10-CM

## 2022-07-27 DIAGNOSIS — R56.9 SEIZURE: Primary | ICD-10-CM

## 2022-07-27 DIAGNOSIS — T14.90XA TRAUMA: ICD-10-CM

## 2022-07-27 DIAGNOSIS — R56.9 ALCOHOL WITHDRAWAL SEIZURE WITH DELIRIUM: ICD-10-CM

## 2022-07-27 LAB
ALBUMIN SERPL BCP-MCNC: 4 G/DL (ref 3.5–5.2)
ALP SERPL-CCNC: 74 U/L (ref 55–135)
ALT SERPL W/O P-5'-P-CCNC: 23 U/L (ref 10–44)
AMPHET+METHAMPHET UR QL: NEGATIVE
ANION GAP SERPL CALC-SCNC: 17 MMOL/L (ref 8–16)
AST SERPL-CCNC: 34 U/L (ref 10–40)
BARBITURATES UR QL SCN>200 NG/ML: NEGATIVE
BASOPHILS # BLD AUTO: 0.04 K/UL (ref 0–0.2)
BASOPHILS NFR BLD: 0.4 % (ref 0–1.9)
BENZODIAZ UR QL SCN>200 NG/ML: NEGATIVE
BILIRUB SERPL-MCNC: 0.9 MG/DL (ref 0.1–1)
BILIRUB UR QL STRIP: NEGATIVE
BUN SERPL-MCNC: 6 MG/DL (ref 8–23)
BZE UR QL SCN: NEGATIVE
CALCIUM SERPL-MCNC: 9.3 MG/DL (ref 8.7–10.5)
CANNABINOIDS UR QL SCN: NEGATIVE
CHLORIDE SERPL-SCNC: 97 MMOL/L (ref 95–110)
CHOLEST SERPL-MCNC: 105 MG/DL (ref 120–199)
CHOLEST/HDLC SERPL: 2 {RATIO} (ref 2–5)
CK SERPL-CCNC: 244 U/L (ref 20–200)
CLARITY UR: CLEAR
CO2 SERPL-SCNC: 20 MMOL/L (ref 23–29)
COLOR UR: COLORLESS
CREAT SERPL-MCNC: 1 MG/DL (ref 0.5–1.4)
CREAT UR-MCNC: 23.9 MG/DL (ref 23–375)
CTP QC/QA: YES
DIFFERENTIAL METHOD: ABNORMAL
EOSINOPHIL # BLD AUTO: 0.1 K/UL (ref 0–0.5)
EOSINOPHIL NFR BLD: 0.5 % (ref 0–8)
ERYTHROCYTE [DISTWIDTH] IN BLOOD BY AUTOMATED COUNT: 12.2 % (ref 11.5–14.5)
EST. GFR  (AFRICAN AMERICAN): >60 ML/MIN/1.73 M^2
EST. GFR  (NON AFRICAN AMERICAN): >60 ML/MIN/1.73 M^2
ESTIMATED AVG GLUCOSE: 137 MG/DL (ref 68–131)
ETHANOL SERPL-MCNC: <10 MG/DL
GLUCOSE SERPL-MCNC: 158 MG/DL (ref 70–110)
GLUCOSE UR QL STRIP: NEGATIVE
HBA1C MFR BLD: 6.4 % (ref 4–5.6)
HCT VFR BLD AUTO: 39.7 % (ref 40–54)
HDLC SERPL-MCNC: 52 MG/DL (ref 40–75)
HDLC SERPL: 49.5 % (ref 20–50)
HGB BLD-MCNC: 13.9 G/DL (ref 14–18)
HGB UR QL STRIP: NEGATIVE
IMM GRANULOCYTES # BLD AUTO: 0.05 K/UL (ref 0–0.04)
IMM GRANULOCYTES NFR BLD AUTO: 0.5 % (ref 0–0.5)
KETONES UR QL STRIP: NEGATIVE
LACTATE SERPL-SCNC: 1.3 MMOL/L (ref 0.5–2.2)
LDLC SERPL CALC-MCNC: 44 MG/DL (ref 63–159)
LEUKOCYTE ESTERASE UR QL STRIP: NEGATIVE
LYMPHOCYTES # BLD AUTO: 1.2 K/UL (ref 1–4.8)
LYMPHOCYTES NFR BLD: 12 % (ref 18–48)
MAGNESIUM SERPL-MCNC: 2.1 MG/DL (ref 1.6–2.6)
MCH RBC QN AUTO: 31.8 PG (ref 27–31)
MCHC RBC AUTO-ENTMCNC: 35 G/DL (ref 32–36)
MCV RBC AUTO: 91 FL (ref 82–98)
METHADONE UR QL SCN>300 NG/ML: NEGATIVE
MONOCYTES # BLD AUTO: 1.1 K/UL (ref 0.3–1)
MONOCYTES NFR BLD: 10.7 % (ref 4–15)
NEUTROPHILS # BLD AUTO: 7.4 K/UL (ref 1.8–7.7)
NEUTROPHILS NFR BLD: 75.9 % (ref 38–73)
NITRITE UR QL STRIP: NEGATIVE
NONHDLC SERPL-MCNC: 53 MG/DL
NRBC BLD-RTO: 0 /100 WBC
OPIATES UR QL SCN: NEGATIVE
PCP UR QL SCN>25 NG/ML: NEGATIVE
PH UR STRIP: 6 [PH] (ref 5–8)
PHOSPHATE SERPL-MCNC: 3 MG/DL (ref 2.7–4.5)
PLATELET # BLD AUTO: 203 K/UL (ref 150–450)
PMV BLD AUTO: 10.5 FL (ref 9.2–12.9)
POCT GLUCOSE: 163 MG/DL (ref 70–110)
POTASSIUM SERPL-SCNC: 3.6 MMOL/L (ref 3.5–5.1)
PROT SERPL-MCNC: 7.7 G/DL (ref 6–8.4)
PROT UR QL STRIP: NEGATIVE
RBC # BLD AUTO: 4.37 M/UL (ref 4.6–6.2)
SARS-COV-2 RDRP RESP QL NAA+PROBE: NEGATIVE
SODIUM SERPL-SCNC: 134 MMOL/L (ref 136–145)
SP GR UR STRIP: 1 (ref 1–1.03)
TOXICOLOGY INFORMATION: NORMAL
TRIGL SERPL-MCNC: 45 MG/DL (ref 30–150)
TROPONIN I SERPL DL<=0.01 NG/ML-MCNC: 0.01 NG/ML (ref 0–0.03)
TSH SERPL DL<=0.005 MIU/L-ACNC: 0.78 UIU/ML (ref 0.4–4)
URN SPEC COLLECT METH UR: ABNORMAL
UROBILINOGEN UR STRIP-ACNC: NEGATIVE EU/DL
WBC # BLD AUTO: 9.79 K/UL (ref 3.9–12.7)

## 2022-07-27 PROCEDURE — 25000003 PHARM REV CODE 250: Performed by: INTERNAL MEDICINE

## 2022-07-27 PROCEDURE — 63600175 PHARM REV CODE 636 W HCPCS: Performed by: STUDENT IN AN ORGANIZED HEALTH CARE EDUCATION/TRAINING PROGRAM

## 2022-07-27 PROCEDURE — U0002 COVID-19 LAB TEST NON-CDC: HCPCS | Performed by: STUDENT IN AN ORGANIZED HEALTH CARE EDUCATION/TRAINING PROGRAM

## 2022-07-27 PROCEDURE — 83036 HEMOGLOBIN GLYCOSYLATED A1C: CPT | Performed by: STUDENT IN AN ORGANIZED HEALTH CARE EDUCATION/TRAINING PROGRAM

## 2022-07-27 PROCEDURE — 93010 EKG 12-LEAD: ICD-10-PCS | Mod: ,,, | Performed by: INTERNAL MEDICINE

## 2022-07-27 PROCEDURE — 80307 DRUG TEST PRSMV CHEM ANLYZR: CPT | Performed by: EMERGENCY MEDICINE

## 2022-07-27 PROCEDURE — 25000003 PHARM REV CODE 250: Performed by: STUDENT IN AN ORGANIZED HEALTH CARE EDUCATION/TRAINING PROGRAM

## 2022-07-27 PROCEDURE — 96375 TX/PRO/DX INJ NEW DRUG ADDON: CPT | Mod: 59

## 2022-07-27 PROCEDURE — 84443 ASSAY THYROID STIM HORMONE: CPT | Performed by: STUDENT IN AN ORGANIZED HEALTH CARE EDUCATION/TRAINING PROGRAM

## 2022-07-27 PROCEDURE — 63600175 PHARM REV CODE 636 W HCPCS: Performed by: INTERNAL MEDICINE

## 2022-07-27 PROCEDURE — 84484 ASSAY OF TROPONIN QUANT: CPT | Performed by: EMERGENCY MEDICINE

## 2022-07-27 PROCEDURE — 12002 RPR S/N/AX/GEN/TRNK2.6-7.5CM: CPT

## 2022-07-27 PROCEDURE — 85025 COMPLETE CBC W/AUTO DIFF WBC: CPT | Performed by: EMERGENCY MEDICINE

## 2022-07-27 PROCEDURE — 82962 GLUCOSE BLOOD TEST: CPT

## 2022-07-27 PROCEDURE — 63600175 PHARM REV CODE 636 W HCPCS: Performed by: EMERGENCY MEDICINE

## 2022-07-27 PROCEDURE — G0378 HOSPITAL OBSERVATION PER HR: HCPCS

## 2022-07-27 PROCEDURE — 25000003 PHARM REV CODE 250: Performed by: EMERGENCY MEDICINE

## 2022-07-27 PROCEDURE — 96365 THER/PROPH/DIAG IV INF INIT: CPT

## 2022-07-27 PROCEDURE — 82077 ASSAY SPEC XCP UR&BREATH IA: CPT | Performed by: EMERGENCY MEDICINE

## 2022-07-27 PROCEDURE — 84100 ASSAY OF PHOSPHORUS: CPT | Performed by: STUDENT IN AN ORGANIZED HEALTH CARE EDUCATION/TRAINING PROGRAM

## 2022-07-27 PROCEDURE — 80053 COMPREHEN METABOLIC PANEL: CPT | Performed by: EMERGENCY MEDICINE

## 2022-07-27 PROCEDURE — 81003 URINALYSIS AUTO W/O SCOPE: CPT | Mod: 59 | Performed by: EMERGENCY MEDICINE

## 2022-07-27 PROCEDURE — 83735 ASSAY OF MAGNESIUM: CPT | Performed by: EMERGENCY MEDICINE

## 2022-07-27 PROCEDURE — 82550 ASSAY OF CK (CPK): CPT | Performed by: STUDENT IN AN ORGANIZED HEALTH CARE EDUCATION/TRAINING PROGRAM

## 2022-07-27 PROCEDURE — 93005 ELECTROCARDIOGRAM TRACING: CPT

## 2022-07-27 PROCEDURE — 99285 EMERGENCY DEPT VISIT HI MDM: CPT | Mod: 25

## 2022-07-27 PROCEDURE — 80061 LIPID PANEL: CPT | Performed by: STUDENT IN AN ORGANIZED HEALTH CARE EDUCATION/TRAINING PROGRAM

## 2022-07-27 PROCEDURE — 83605 ASSAY OF LACTIC ACID: CPT | Performed by: STUDENT IN AN ORGANIZED HEALTH CARE EDUCATION/TRAINING PROGRAM

## 2022-07-27 PROCEDURE — 93010 ELECTROCARDIOGRAM REPORT: CPT | Mod: ,,, | Performed by: INTERNAL MEDICINE

## 2022-07-27 RX ORDER — LEVETIRACETAM 500 MG/1
500 TABLET ORAL 2 TIMES DAILY
Status: DISCONTINUED | OUTPATIENT
Start: 2022-07-28 | End: 2022-07-28 | Stop reason: HOSPADM

## 2022-07-27 RX ORDER — PANTOPRAZOLE SODIUM 40 MG/1
40 TABLET, DELAYED RELEASE ORAL DAILY
Status: DISCONTINUED | OUTPATIENT
Start: 2022-07-28 | End: 2022-07-28 | Stop reason: HOSPADM

## 2022-07-27 RX ORDER — DIAZEPAM 5 MG/1
5 TABLET ORAL
Status: COMPLETED | OUTPATIENT
Start: 2022-07-27 | End: 2022-07-27

## 2022-07-27 RX ORDER — SPIRONOLACTONE 25 MG/1
25 TABLET ORAL DAILY
Status: DISCONTINUED | OUTPATIENT
Start: 2022-07-28 | End: 2022-07-28 | Stop reason: HOSPADM

## 2022-07-27 RX ORDER — DIAZEPAM 5 MG/1
10 TABLET ORAL EVERY 8 HOURS
Status: DISCONTINUED | OUTPATIENT
Start: 2022-07-28 | End: 2022-07-28 | Stop reason: HOSPADM

## 2022-07-27 RX ORDER — LEVETIRACETAM 500 MG/5ML
2000 INJECTION, SOLUTION, CONCENTRATE INTRAVENOUS
Status: COMPLETED | OUTPATIENT
Start: 2022-07-27 | End: 2022-07-27

## 2022-07-27 RX ORDER — THIAMINE HYDROCHLORIDE 100 MG/ML
500 INJECTION, SOLUTION INTRAMUSCULAR; INTRAVENOUS 3 TIMES DAILY
Status: DISCONTINUED | OUTPATIENT
Start: 2022-07-27 | End: 2022-07-27

## 2022-07-27 RX ORDER — LORAZEPAM 2 MG/ML
2 INJECTION INTRAMUSCULAR EVERY 4 HOURS PRN
Status: DISCONTINUED | OUTPATIENT
Start: 2022-07-27 | End: 2022-07-28 | Stop reason: HOSPADM

## 2022-07-27 RX ORDER — FUROSEMIDE 40 MG/1
40 TABLET ORAL DAILY
Status: DISCONTINUED | OUTPATIENT
Start: 2022-07-28 | End: 2022-07-28 | Stop reason: HOSPADM

## 2022-07-27 RX ORDER — OMEPRAZOLE 20 MG/1
CAPSULE, DELAYED RELEASE ORAL
COMMUNITY
End: 2022-07-27 | Stop reason: DRUGHIGH

## 2022-07-27 RX ORDER — TALC
6 POWDER (GRAM) TOPICAL NIGHTLY PRN
Status: DISCONTINUED | OUTPATIENT
Start: 2022-07-27 | End: 2022-07-28 | Stop reason: HOSPADM

## 2022-07-27 RX ORDER — FOLIC ACID 1 MG/1
1 TABLET ORAL DAILY
Status: DISCONTINUED | OUTPATIENT
Start: 2022-07-28 | End: 2022-07-28 | Stop reason: HOSPADM

## 2022-07-27 RX ORDER — ENOXAPARIN SODIUM 100 MG/ML
40 INJECTION SUBCUTANEOUS EVERY 24 HOURS
Status: DISCONTINUED | OUTPATIENT
Start: 2022-07-27 | End: 2022-07-28 | Stop reason: HOSPADM

## 2022-07-27 RX ORDER — ATORVASTATIN CALCIUM 40 MG/1
80 TABLET, FILM COATED ORAL DAILY
Status: DISCONTINUED | OUTPATIENT
Start: 2022-07-28 | End: 2022-07-28 | Stop reason: HOSPADM

## 2022-07-27 RX ORDER — DIAZEPAM 5 MG/1
10 TABLET ORAL EVERY 6 HOURS
Status: DISCONTINUED | OUTPATIENT
Start: 2022-07-28 | End: 2022-07-28 | Stop reason: HOSPADM

## 2022-07-27 RX ORDER — ACETAMINOPHEN 500 MG
2000 TABLET ORAL DAILY
COMMUNITY

## 2022-07-27 RX ORDER — METOPROLOL TARTRATE 50 MG/1
100 TABLET ORAL 2 TIMES DAILY
Status: DISCONTINUED | OUTPATIENT
Start: 2022-07-27 | End: 2022-07-28 | Stop reason: HOSPADM

## 2022-07-27 RX ORDER — LIDOCAINE HYDROCHLORIDE AND EPINEPHRINE 20; 10 MG/ML; UG/ML
1 INJECTION, SOLUTION INFILTRATION; PERINEURAL ONCE
Status: DISCONTINUED | OUTPATIENT
Start: 2022-07-27 | End: 2022-07-28 | Stop reason: HOSPADM

## 2022-07-27 RX ORDER — FOLIC ACID 1 MG/1
1 TABLET ORAL DAILY
COMMUNITY

## 2022-07-27 RX ORDER — SODIUM CHLORIDE 0.9 % (FLUSH) 0.9 %
10 SYRINGE (ML) INJECTION
Status: DISCONTINUED | OUTPATIENT
Start: 2022-07-27 | End: 2022-07-28 | Stop reason: HOSPADM

## 2022-07-27 RX ADMIN — METOPROLOL TARTRATE 100 MG: 50 TABLET, FILM COATED ORAL at 09:07

## 2022-07-27 RX ADMIN — SODIUM CHLORIDE, SODIUM LACTATE, POTASSIUM CHLORIDE, AND CALCIUM CHLORIDE 1000 ML: .6; .31; .03; .02 INJECTION, SOLUTION INTRAVENOUS at 10:07

## 2022-07-27 RX ADMIN — THIAMINE HYDROCHLORIDE 500 MG: 100 INJECTION, SOLUTION INTRAMUSCULAR; INTRAVENOUS at 10:07

## 2022-07-27 RX ADMIN — DIAZEPAM 5 MG: 5 TABLET ORAL at 06:07

## 2022-07-27 RX ADMIN — LEVETIRACETAM 2000 MG: 100 INJECTION, SOLUTION INTRAVENOUS at 08:07

## 2022-07-27 NOTE — ED PROVIDER NOTES
Encounter Date: 7/27/2022       History     Chief Complaint   Patient presents with    Seizures     Pt had a witnessed seizure at [facility]. Pt has a lac on his right shin, hematoma on his forehead, and bit his tongue. Pt is currently only oriented to self. Currently denies hx of seizures     HPI   76-year-old male with history of alcohol abuse, seizure disorder, hypertension, presented to the emergency department after witnessed seizure at [facility]  Patient thought he was at home, confused, but knows that this is the hospital, has no complaints  States that he feels fine  Review of patient's allergies indicates:  No Known Allergies  Past Medical History:   Diagnosis Date    Alcohol abuse     Arthritis     Convulsion 10/7/2020    GERD (gastroesophageal reflux disease)     Glaucoma     Hypertension      Past Surgical History:   Procedure Laterality Date    KNEE CARTILAGE SURGERY Right      Family History   Problem Relation Age of Onset    Hypertension Mother     Hypertension Brother      Social History     Tobacco Use    Smoking status: Never Smoker    Smokeless tobacco: Never Used   Substance Use Topics    Alcohol use: Yes     Alcohol/week: 70.0 standard drinks     Types: 70 Standard drinks or equivalent per week     Comment: drinks between 8-10 beers daily    Drug use: No     Review of Systems   Unable to perform ROS: Mental status change       Physical Exam     Initial Vitals [07/27/22 1558]   BP Pulse Resp Temp SpO2   (!) 149/70 81 20 97.7 °F (36.5 °C) 95 %      MAP       --         Physical Exam    Nursing note and vitals reviewed.  Constitutional:   EXAM  General: Awake, alert and oriented. No acute distress.     Head: normocephalic and bruising to the left forehead, no open areas.     Eyes: Conjunctivae are clear without exudates or hemorrhage. Sclera is non-icteric. EOM are intact. Eyelids are normal in appearance without swelling or lesions.  Small pupils, reactive bilaterally.     Ears: The  external ear and ear canal are non-tender and without swelling. The canal is clear without discharge. Hearing intact.     Nose: Nares are patent bilaterally.    Mouth:  Stellate laceration left tongue, not through and through.  Hemostatic.  No tongue fasciculations.     Neck: The neck is supple. Trachea is midline. Full ROM.     Cardiac: Regular rate.     Respiratory: No signs of respiratory distress. No audible wheezes.     Abdominal: Non-distended.     Extremities:  A cm right shin laceration, linear.  Hemostatic.     Skin: Appropriate color for ethnicity.     Neurological: The patient is awake, alert and oriented to person, place normal speech.     Psychiatric: Appropriate mood and affect.     In light of current/ongoing global covid-19 pandemic, all my encounters w pt were with full ppe including but not limited to gown, gloves, n95, eye protection OR from >6 ft away.             ED Course   Procedures  Labs Reviewed   CBC W/ AUTO DIFFERENTIAL - Abnormal; Notable for the following components:       Result Value    RBC 4.37 (*)     Hemoglobin 13.9 (*)     Hematocrit 39.7 (*)     MCH 31.8 (*)     Immature Grans (Abs) 0.05 (*)     Mono # 1.1 (*)     Gran % 75.9 (*)     Lymph % 12.0 (*)     All other components within normal limits   COMPREHENSIVE METABOLIC PANEL - Abnormal; Notable for the following components:    Sodium 134 (*)     CO2 20 (*)     Glucose 158 (*)     BUN 6 (*)     Anion Gap 17 (*)     All other components within normal limits   URINALYSIS, REFLEX TO URINE CULTURE - Abnormal; Notable for the following components:    Color, UA Colorless (*)     All other components within normal limits    Narrative:     Specimen Source->Urine   POCT GLUCOSE - Abnormal; Notable for the following components:    POCT Glucose 163 (*)     All other components within normal limits   ALCOHOL,MEDICAL (ETHANOL)   DRUG SCREEN PANEL, URINE EMERGENCY    Narrative:     Specimen Source->Urine   TROPONIN I   MAGNESIUM   LACTIC  ACID, PLASMA   CK   PHOSPHORUS   TSH   HEMOGLOBIN A1C   LIPID PANEL   POCT GLUCOSE MONITORING CONTINUOUS        ECG Results          EKG 12-lead (In process)  Result time 07/27/22 16:16:09    In process by Interface, Lab In [facility] (07/27/22 16:16:09)                 Narrative:    Test Reason : R56.9,    Vent. Rate : 073 BPM     Atrial Rate : 073 BPM     P-R Int : 190 ms          QRS Dur : 098 ms      QT Int : 422 ms       P-R-T Axes : 059 -29 049 degrees     QTc Int : 464 ms    Normal sinus rhythm  Normal ECG  When compared with ECG of 07-OCT-2020 05:07,  T wave inversion no longer evident in Inferior leads    Referred By: System System           Confirmed By:                             Imaging Results          CT Head Without Contrast (Final result)  Result time 07/27/22 17:54:12    Final result by Vernon Hdez MD (07/27/22 17:54:12)                 Impression:      1. No acute intracranial process.  2. Involutional changes with chronic microvascular ischemic changes.  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      Electronically signed by: Vernon Hdez  Date:    07/27/2022  Time:    17:54             Narrative:    EXAMINATION:  CT HEAD WITHOUT CONTRAST    CLINICAL HISTORY:  seizure;    TECHNIQUE:  Low dose axial CT images obtained throughout the head without intravenous contrast. Sagittal and coronal reconstructions were performed.    COMPARISON:  10/07/2020    FINDINGS:  Intracranial compartment:    Ventricles and sulci are normal in size for age without evidence of hydrocephalus. No extra-axial blood or fluid collections.    Moderate involutional changes and chronic microvascular ischemic changes in the periventricular and subcortical white matter.  Small remote lacunar infarct of the thalamus on the right.  No parenchymal mass, hemorrhage, edema or major vascular distribution infarct.    Skull/extracranial contents (limited evaluation): No fracture. Mastoid air cells and paranasal sinuses are  essentially clear.                               X-Ray Tibia Fibula 2 View Right (Final result)  Result time 07/27/22 17:51:45    Final result by Abdulkadir Latham DO (07/27/22 17:51:45)                 Impression:      No acute osseous abnormality.      Electronically signed by: Abdulkadir Latham  Date:    07/27/2022  Time:    17:51             Narrative:    EXAMINATION:  XR TIBIA FIBULA 2 VIEW RIGHT    CLINICAL HISTORY:  Injury, unspecified, initial encounter    TECHNIQUE:  AP and lateral views of the right tibia and fibula were performed.    COMPARISON:  None.    FINDINGS:  There are postop changes of right total knee arthroplasty.  Hardware is intact.  There is no evidence of a perihardware fracture in the proximal tibia.  There are multiple ghost tracks in the tibia.  Alignment is normal.  No acute fracture or dislocation.  There are extensive vascular calcifications.                                 Medications   LIDOcaine 2%/EPINEPHrine 1:100,000 injection 1 mL (has no administration in time range)   levETIRAcetam injection 2,000 mg (has no administration in time range)   diazePAM tablet 5 mg (5 mg Oral Given 7/27/22 1813)     Medical Decision Making:   ED Management:  Patient is confused, but is awake and answering questions appropriately.  Tongue laceration will not be repaired as it is hemostatic and not through and through.  Right shin laceration repaired with Steri-Strips and Dermabond.  Labs are mostly unremarkable and CT head does not show acute trauma.  Patient's alcohol level is 0.  Plan to admit for most likely seizure, possible alcohol withdrawal seizure.  P.o. Valium given, patient admitted.                      Clinical Impression:   Final diagnoses:  [R56.9] Seizure (Primary)  [T14.90XA] Trauma          ED Disposition Condition    Observation               Leandra Reyes MD  07/27/22 1939

## 2022-07-28 VITALS
TEMPERATURE: 99 F | WEIGHT: 231 LBS | RESPIRATION RATE: 18 BRPM | HEART RATE: 58 BPM | DIASTOLIC BLOOD PRESSURE: 72 MMHG | HEIGHT: 70 IN | BODY MASS INDEX: 33.07 KG/M2 | SYSTOLIC BLOOD PRESSURE: 149 MMHG | OXYGEN SATURATION: 97 %

## 2022-07-28 PROBLEM — F10.939 ALCOHOL WITHDRAWAL: Status: ACTIVE | Noted: 2022-07-28

## 2022-07-28 PROBLEM — F10.20 ALCOHOL USE DISORDER, MODERATE, DEPENDENCE: Status: ACTIVE | Noted: 2022-07-28

## 2022-07-28 PROBLEM — R56.9 SEIZURE: Status: ACTIVE | Noted: 2022-07-28

## 2022-07-28 LAB
ALBUMIN SERPL BCP-MCNC: 3.3 G/DL (ref 3.5–5.2)
ALP SERPL-CCNC: 60 U/L (ref 55–135)
ALT SERPL W/O P-5'-P-CCNC: 18 U/L (ref 10–44)
ANION GAP SERPL CALC-SCNC: 11 MMOL/L (ref 8–16)
AORTIC ROOT ANNULUS: 3.58 CM
AORTIC VALVE CUSP SEPERATION: 1.84 CM
AST SERPL-CCNC: 30 U/L (ref 10–40)
AV INDEX (PROSTH): 0.94
AV MEAN GRADIENT: 2 MMHG
AV PEAK GRADIENT: 4 MMHG
AV VALVE AREA: 3.18 CM2
AV VELOCITY RATIO: 0.88
BASOPHILS # BLD AUTO: 0.03 K/UL (ref 0–0.2)
BASOPHILS NFR BLD: 0.4 % (ref 0–1.9)
BILIRUB SERPL-MCNC: 1.2 MG/DL (ref 0.1–1)
BSA FOR ECHO PROCEDURE: 2.27 M2
BUN SERPL-MCNC: 5 MG/DL (ref 8–23)
CALCIUM SERPL-MCNC: 8.6 MG/DL (ref 8.7–10.5)
CHLORIDE SERPL-SCNC: 99 MMOL/L (ref 95–110)
CO2 SERPL-SCNC: 24 MMOL/L (ref 23–29)
CREAT SERPL-MCNC: 0.8 MG/DL (ref 0.5–1.4)
CV ECHO LV RWT: 0.41 CM
DIFFERENTIAL METHOD: ABNORMAL
DOP CALC AO PEAK VEL: 1.04 M/S
DOP CALC AO VTI: 26.11 CM
DOP CALC LVOT AREA: 3.4 CM2
DOP CALC LVOT DIAMETER: 2.07 CM
DOP CALC LVOT PEAK VEL: 0.92 M/S
DOP CALC LVOT STROKE VOLUME: 82.98 CM3
DOP CALC MV VTI: 41.95 CM
DOP CALCLVOT PEAK VEL VTI: 24.67 CM
E WAVE DECELERATION TIME: 281.07 MSEC
E/A RATIO: 1.01
E/E' RATIO: 10.56 M/S
ECHO LV POSTERIOR WALL: 0.99 CM (ref 0.6–1.1)
EJECTION FRACTION: 55 %
EOSINOPHIL # BLD AUTO: 0.2 K/UL (ref 0–0.5)
EOSINOPHIL NFR BLD: 1.8 % (ref 0–8)
ERYTHROCYTE [DISTWIDTH] IN BLOOD BY AUTOMATED COUNT: 12.2 % (ref 11.5–14.5)
EST. GFR  (AFRICAN AMERICAN): >60 ML/MIN/1.73 M^2
EST. GFR  (NON AFRICAN AMERICAN): >60 ML/MIN/1.73 M^2
FRACTIONAL SHORTENING: 37 % (ref 28–44)
GLUCOSE SERPL-MCNC: 94 MG/DL (ref 70–110)
HCT VFR BLD AUTO: 36.6 % (ref 40–54)
HGB BLD-MCNC: 12.6 G/DL (ref 14–18)
IMM GRANULOCYTES # BLD AUTO: 0.02 K/UL (ref 0–0.04)
IMM GRANULOCYTES NFR BLD AUTO: 0.2 % (ref 0–0.5)
INTERVENTRICULAR SEPTUM: 1.22 CM (ref 0.6–1.1)
IVRT: 97.05 MSEC
LA MAJOR: 5.22 CM
LA MINOR: 5.79 CM
LA WIDTH: 4.49 CM
LEFT ATRIUM SIZE: 3.25 CM
LEFT ATRIUM VOLUME INDEX MOD: 30.7 ML/M2
LEFT ATRIUM VOLUME INDEX: 30.7 ML/M2
LEFT ATRIUM VOLUME MOD: 68.09 CM3
LEFT ATRIUM VOLUME: 68.1 CM3
LEFT INTERNAL DIMENSION IN SYSTOLE: 3.05 CM (ref 2.1–4)
LEFT VENTRICLE DIASTOLIC VOLUME INDEX: 49.36 ML/M2
LEFT VENTRICLE DIASTOLIC VOLUME: 109.59 ML
LEFT VENTRICLE MASS INDEX: 89 G/M2
LEFT VENTRICLE SYSTOLIC VOLUME INDEX: 16.4 ML/M2
LEFT VENTRICLE SYSTOLIC VOLUME: 36.46 ML
LEFT VENTRICULAR INTERNAL DIMENSION IN DIASTOLE: 4.84 CM (ref 3.5–6)
LEFT VENTRICULAR MASS: 197.8 G
LV LATERAL E/E' RATIO: 9.5 M/S
LV SEPTAL E/E' RATIO: 11.88 M/S
LYMPHOCYTES # BLD AUTO: 1.5 K/UL (ref 1–4.8)
LYMPHOCYTES NFR BLD: 17.8 % (ref 18–48)
MCH RBC QN AUTO: 32.2 PG (ref 27–31)
MCHC RBC AUTO-ENTMCNC: 34.4 G/DL (ref 32–36)
MCV RBC AUTO: 94 FL (ref 82–98)
MONOCYTES # BLD AUTO: 1.4 K/UL (ref 0.3–1)
MONOCYTES NFR BLD: 16.2 % (ref 4–15)
MV A" WAVE DURATION": 14.84 MSEC
MV PEAK A VEL: 0.94 M/S
MV PEAK E VEL: 0.95 M/S
MV PEAK GRADIENT: 4 MMHG
MV STENOSIS PRESSURE HALF TIME: 81.51 MS
MV VALVE AREA BY CONTINUITY EQUATION: 1.98 CM2
MV VALVE AREA P 1/2 METHOD: 2.7 CM2
NEUTROPHILS # BLD AUTO: 5.3 K/UL (ref 1.8–7.7)
NEUTROPHILS NFR BLD: 63.6 % (ref 38–73)
NRBC BLD-RTO: 0 /100 WBC
PISA TR MAX VEL: 2.63 M/S
PLATELET # BLD AUTO: 171 K/UL (ref 150–450)
PMV BLD AUTO: 11 FL (ref 9.2–12.9)
POTASSIUM SERPL-SCNC: 3.5 MMOL/L (ref 3.5–5.1)
PROT SERPL-MCNC: 6.7 G/DL (ref 6–8.4)
PULM VEIN S/D RATIO: 1.47
PV PEAK D VEL: 0.47 M/S
PV PEAK S VEL: 0.69 M/S
PV PEAK VELOCITY: 0.96 CM/S
RA PRESSURE: 3 MMHG
RBC # BLD AUTO: 3.91 M/UL (ref 4.6–6.2)
RIGHT VENTRICULAR END-DIASTOLIC DIMENSION: 3.15 CM
SODIUM SERPL-SCNC: 134 MMOL/L (ref 136–145)
TDI LATERAL: 0.1 M/S
TDI SEPTAL: 0.08 M/S
TDI: 0.09 M/S
TR MAX PG: 28 MMHG
TRICUSPID ANNULAR PLANE SYSTOLIC EXCURSION: 2.57 CM
TV REST PULMONARY ARTERY PRESSURE: 31 MMHG
WBC # BLD AUTO: 8.31 K/UL (ref 3.9–12.7)

## 2022-07-28 PROCEDURE — 97165 OT EVAL LOW COMPLEX 30 MIN: CPT

## 2022-07-28 PROCEDURE — 25000003 PHARM REV CODE 250: Performed by: INTERNAL MEDICINE

## 2022-07-28 PROCEDURE — 97161 PT EVAL LOW COMPLEX 20 MIN: CPT

## 2022-07-28 PROCEDURE — 25000003 PHARM REV CODE 250: Performed by: STUDENT IN AN ORGANIZED HEALTH CARE EDUCATION/TRAINING PROGRAM

## 2022-07-28 PROCEDURE — 99214 OFFICE O/P EST MOD 30 MIN: CPT | Mod: ,,, | Performed by: PSYCHIATRY & NEUROLOGY

## 2022-07-28 PROCEDURE — 63600175 PHARM REV CODE 636 W HCPCS: Performed by: INTERNAL MEDICINE

## 2022-07-28 PROCEDURE — 97530 THERAPEUTIC ACTIVITIES: CPT

## 2022-07-28 PROCEDURE — 96366 THER/PROPH/DIAG IV INF ADDON: CPT

## 2022-07-28 PROCEDURE — 36415 COLL VENOUS BLD VENIPUNCTURE: CPT | Performed by: STUDENT IN AN ORGANIZED HEALTH CARE EDUCATION/TRAINING PROGRAM

## 2022-07-28 PROCEDURE — 99214 PR OFFICE/OUTPT VISIT, EST, LEVL IV, 30-39 MIN: ICD-10-PCS | Mod: ,,, | Performed by: PSYCHIATRY & NEUROLOGY

## 2022-07-28 PROCEDURE — G0378 HOSPITAL OBSERVATION PER HR: HCPCS

## 2022-07-28 PROCEDURE — 85025 COMPLETE CBC W/AUTO DIFF WBC: CPT | Performed by: STUDENT IN AN ORGANIZED HEALTH CARE EDUCATION/TRAINING PROGRAM

## 2022-07-28 PROCEDURE — 80053 COMPREHEN METABOLIC PANEL: CPT | Performed by: STUDENT IN AN ORGANIZED HEALTH CARE EDUCATION/TRAINING PROGRAM

## 2022-07-28 PROCEDURE — 96372 THER/PROPH/DIAG INJ SC/IM: CPT | Performed by: STUDENT IN AN ORGANIZED HEALTH CARE EDUCATION/TRAINING PROGRAM

## 2022-07-28 PROCEDURE — 94761 N-INVAS EAR/PLS OXIMETRY MLT: CPT

## 2022-07-28 PROCEDURE — 99900035 HC TECH TIME PER 15 MIN (STAT)

## 2022-07-28 PROCEDURE — 63600175 PHARM REV CODE 636 W HCPCS: Performed by: STUDENT IN AN ORGANIZED HEALTH CARE EDUCATION/TRAINING PROGRAM

## 2022-07-28 RX ORDER — LEVETIRACETAM 500 MG/1
500 TABLET ORAL 2 TIMES DAILY
Qty: 60 TABLET | Refills: 11 | Status: SHIPPED | OUTPATIENT
Start: 2022-07-28 | End: 2023-01-26 | Stop reason: SDUPTHER

## 2022-07-28 RX ORDER — METOPROLOL TARTRATE 50 MG/1
50 TABLET ORAL 2 TIMES DAILY
Qty: 60 TABLET | Refills: 11 | Status: SHIPPED | OUTPATIENT
Start: 2022-07-28

## 2022-07-28 RX ADMIN — ATORVASTATIN CALCIUM 80 MG: 40 TABLET, FILM COATED ORAL at 09:07

## 2022-07-28 RX ADMIN — THERA TABS 1 TABLET: TAB at 09:07

## 2022-07-28 RX ADMIN — DIAZEPAM 10 MG: 5 TABLET ORAL at 12:07

## 2022-07-28 RX ADMIN — THIAMINE HYDROCHLORIDE 500 MG: 100 INJECTION, SOLUTION INTRAMUSCULAR; INTRAVENOUS at 09:07

## 2022-07-28 RX ADMIN — FOLIC ACID 1 MG: 1 TABLET ORAL at 09:07

## 2022-07-28 RX ADMIN — SPIRONOLACTONE 25 MG: 25 TABLET, FILM COATED ORAL at 09:07

## 2022-07-28 RX ADMIN — PANTOPRAZOLE SODIUM 40 MG: 40 TABLET, DELAYED RELEASE ORAL at 09:07

## 2022-07-28 RX ADMIN — ENOXAPARIN SODIUM 40 MG: 100 INJECTION SUBCUTANEOUS at 12:07

## 2022-07-28 RX ADMIN — DIAZEPAM 10 MG: 5 TABLET ORAL at 11:07

## 2022-07-28 RX ADMIN — FUROSEMIDE 40 MG: 40 TABLET ORAL at 09:07

## 2022-07-28 RX ADMIN — DIAZEPAM 10 MG: 5 TABLET ORAL at 05:07

## 2022-07-28 RX ADMIN — METOPROLOL TARTRATE 100 MG: 50 TABLET, FILM COATED ORAL at 09:07

## 2022-07-28 RX ADMIN — LEVETIRACETAM 500 MG: 500 TABLET, FILM COATED ORAL at 09:07

## 2022-07-28 NOTE — ED NOTES
Pt AAOx3 at this time. No tremors noted. Pt has no complaints at this time. Spouse at bedside. CB within reach.

## 2022-07-28 NOTE — PT/OT/SLP EVAL
"Physical Therapy Evaluation and Discharge Note    Patient Name:  Roshan Rankin   MRN:  [id number]    Recommendations:     Discharge Recommendations:  home (family assist as needed)   Discharge Equipment Recommendations: none   Barriers to discharge: None    Assessment:     Roshan Rankin is a 77 y.o. male admitted with a medical diagnosis of Seizure. .  At this time, patient is functioning at their prior level of function and does not require further acute PT services.     Pt ambulated ~200 ft with no AD and with mostly Supervision - pt requiring CGA for steadying for one instance where pt was veering to his R side and but was able to mostly self-correct. Pt reported it was because he "wasn't paying attention." Pt reports he is at baseline with ambulation. No LOB noted. Pt demonstrating impaired memory. Recommending d/c home with family assist as needed. No DME needs. No further skilled acute PT needs identified. D/c PT.     Recent Surgery: * No surgery found *      Plan:     During this hospitalization, patient does not require further acute PT services.  Please re-consult if situation changes.      Subjective     Chief Complaint: Soreness   Patient/Family Comments/goals: Return home to OF, pt's wife expressing concern regarding pt's cognition  Pain/Comfort:  · Pain Rating 1:  (not rated, reports "soreness")  · Location - Side 1: Right  · Location - Orientation 1: lower  · Location 1: leg  · Pain Addressed 1: Reposition, Distraction  · Pain Rating Post-Intervention 1:  (did not rate)    Patients cultural, spiritual, Taoism conflicts given the current situation: no    Living Environment:  Pt lives with his spouse in a H, 0 KE, and WIS with bench  Prior to admission, patients level of function was Independent with no DME needs. Pt drives and is retired.  Equipment used at home: none (access to RW, W/C).  DME owned (not currently used): rolling walker and wheelchair.  Upon discharge, patient will have " "assistance from spouse.    Objective:     Communicated with nsg prior to session.  Patient found HOB elevated with   upon PT entry to room.    General Precautions: Standard, fall   Orthopedic Precautions:N/A   Braces: N/A   Respiratory Status: Room air    Exams:  · Cognitive Exam:  Patient is oriented to Person, Place, Time and Situation; impaired memory and delayed responses and processing speed  · Fine Motor Coordination:    · -       Intact  Rapid alternating ankle DF/PF  · Postural Exam:  Patient presented with the following abnormalities:    · -       Rounded shoulders  · Sensation:    · -       Impaired  numbness/tingling toes on L foot, pt's spouse reporting MD assessing for neuropathy, LT intact upon testing  · Skin Integrity/Edema:      · -       Skin integrity: Wound R lower leg, laceration with butterfly stitches   · -       Edema: Mild R lower leg  · RLE ROM: WFL  · RLE Strength: WFL  · LLE ROM: WFL  · LLE Strength: WFL    Functional Mobility:  · Bed Mobility:     · Scooting: modified independence  · Supine to Sit: modified independence  · Sit to Supine: modified independence  · Transfers:     · Sit to Stand:  stand by assistance with no AD  · Gait: Pt ambulated ~200 ft with no AD and with mostly Supervision - pt requiring CGA for steadying for one instance where pt was veering to his R side and but was able to mostly self-correct. Pt reported it was because he "wasn't paying attention." No significant gait deficits noted. No LOB noted.     AM-PAC 6 CLICK MOBILITY  Total Score:20       Therapeutic Activities and Exercises:   Pt/spouse educated on role of PT/POC, daily inspection of feet for wounds, and overall home safety.  See above for mobility.     AM-PAC 6 CLICK MOBILITY  Total Score:20     Patient left HOB elevated with all lines intact, call button in reach, bed alarm on, nsg notified and spouse present.    GOALS:   Multidisciplinary Problems     Physical Therapy Goals     Not on file          " Multidisciplinary Problems (Resolved)        Problem: Physical Therapy    Goal Priority Disciplines Outcome Goal Variances Interventions   Physical Therapy Goal   (Resolved)     PT, PT/OT Met                     History:     Past Medical History:   Diagnosis Date    Alcohol abuse     Arthritis     Convulsion 10/7/2020    GERD (gastroesophageal reflux disease)     Glaucoma     Hypertension        Past Surgical History:   Procedure Laterality Date    KNEE CARTILAGE SURGERY Right        Time Tracking:     PT Received On: 07/28/22  PT Start Time: 1111     PT Stop Time: 1129  PT Total Time (min): 18 min     Billable Minutes: Evaluation 18 (co-eval with OT)      07/28/2022

## 2022-07-28 NOTE — NURSING
Pt arrived to unit via stretcher with Patient Escort and wife, NAD.  LAC 18 gauge SL.  Laceration noted to right shin with steri strips in place, small amount of sanguineous fluid noted.  Pt and wife oriented to room, bed in lowest position, rails up x 3 with padding and call light within reach.  Will continue to monitor patient.

## 2022-07-28 NOTE — PLAN OF CARE
went to meet with patient. Spouse Rona at bedside. Patient is independent and lives at home with his spouse. He does not have any DME or Home Health. Wife will provide transport home and to appointments. His PCP is at [facility]. Wife requested Friday appointments, however, if unable to schedule any day is fine as well.  requested PCP appointment from access navigator. Patient encouraged to call with any questions or concerns.  will continue to follow patient through transitions of care and assist with any discharge needs.     Follow-up information reviewed with patient wife. She verbalized understanding.    Patient Contacts    Name Relation Home Work Mobile   Rona Rankin Spouse  [phone]   Staci Paredes Daughter [phone]     [facility] [place] - [phone] [address], [facility] [address] [place] [id number] Steps: Follow up in 2 week(s)Appointment: Instructions: Hospital Follow-Up You will be contacted with follow-up information.    MD Haley Tim, [facility] - GeneralInternal [phone] [facility] [address] [place] [address]Idng Steps: Follow up on 8/4/2022Appointment: Instructions: at 2:30 pm, Primary Care Physician       07/28/22 1305   Discharge Assessment   Assessment Type Discharge Planning Assessment   Confirmed/corrected address, phone number and insurance Yes   Confirmed Demographics Correct on Facesheet   Source of Information patient;family   Lives With significant other;spouse   Facility Arrived From: Home   Do you expect to return to your current living situation? Yes   Do you have help at home or someone to help you manage your care at home? Yes   Who are your caregiver(s) and their phone number(s)? Spouse--Rona   Prior to hospitilization cognitive status: Alert/Oriented   Current cognitive status: Alert/Oriented    Walking or Climbing Stairs Difficulty none   Dressing/Bathing Difficulty none   Equipment Currently Used at Home none   Do you take prescription medications? Yes   Do you have prescription coverage? Yes   Do you have any problems affording any of your prescribed medications? No   Is the patient taking medications as prescribed? yes   Who is going to help you get home at discharge? Spouse--Pamula   How do you get to doctors appointments? family or friend will provide   Are you on dialysis? No   Do you take coumadin? No   Discharge Plan A Home   Discharge Plan B Home with family   DME Needed Upon Discharge  none   Discharge Plan discussed with: Patient;Spouse/sig other     Allison Olivares RN    [phone]

## 2022-07-28 NOTE — H&P
"[facility] Medicine H&P Note     Admitting Team: [facility] Hospitalist Team A  Attending Physician: Miguel  Resident: Jaziel  Intern: Harpreet    Date of Admit: 7/27/2022    Chief Complaint        Witnessed Seizure  x1      Subjective:      History of Present Illness:  Roshan Rankin is a 77 y.o. male with a PMHx of HTN, ETOH abuse (10-12 beers every day) c/b withdrawal provoked seizures (admit in 10/2020). The patient presented on 7/27/2022 for evaluation after witnessed seizure. Per patients wife, he was in his normal state of health this morning upon awakening. She states around 7AM he got up and made coffee and started to read his paper. She states she then left for work for the day. She received a phone call from the patient at around 1130AM where he stated that he had "just woken up and was drinking his coffee and reading his paper." His wife states that she was alarmed by his response (due to having woken up and started those tasks at 7AM) and asked him if he was feeling okay. He stated that he was fine so his wife continued throughout her work day. She states she received no further contact regarding her  until a Ring Doorbell notification at approximately 3:45PM where she observed a  at her front door. She states that she returned home from work at that time to find no one home and her husbands vehicle gone. She then received a phone call from her  at approximately 5:15PM where he stated that he was in emergency room at [facility].     According to EMS, the patient experienced a witnessed seizure w/ LOC, bladder incontinence and tongue biting at the grocery store and was subsequently brought to [facility]. He was reported to be in a confused state following this event and was disoriented to person, place, and time.     Of note, patient has a history of alcohol abuse with daily intake around 10-12 beers. He states that this morning and today that he has not had any alcohol and that his last beer " was the night prior.      In the ED, the patient received CT head with no acute findings. He also received valium and was given a loading dose of Keppra. Physical exam was significant for laceration to the right shin and tongue, as well as tremulousness of the upper and lower extremities at rest. In addition, the patient was found to still only be oriented to person and place. [facility] IM was consulted for the evaluation and management of seizure activity in the context of chronic alcohol use disorder.     History obtained by patient interview and supplemented by nursing documentation and chart review.     Past Medical History:  Past Medical History:   Diagnosis Date    Alcohol abuse     Arthritis     Convulsion 10/7/2020    GERD (gastroesophageal reflux disease)     Glaucoma     Hypertension      Past Surgical History:  Past Surgical History:   Procedure Laterality Date    KNEE CARTILAGE SURGERY Right      Allergies:  Review of patient's allergies indicates:  No Known Allergies  Home Medications:  Current Outpatient Medications   Medication Instructions    ascorbate calcium (FLORENTIN-C ORAL) 1,000 mg, Oral, Daily    cholecalciferol (vitamin D3) (D3-2000) 2,000 Units, Oral, Daily    folic acid (FOLVITE) 1 mg, Oral, Daily    folic acid/multivit-min/lutein (CENTRUM SILVER ORAL) 1 tablet, Oral, Daily    furosemide (LASIX) 40 mg, Oral, Daily    latanoprost 0.005 % ophthalmic solution 1 drop, Both Eyes, Daily    metoprolol tartrate (LOPRESSOR) 100 mg, Oral, 2 times daily    omeprazole (PRILOSEC) 40 mg, Oral, Daily    rosuvastatin (CRESTOR) 20 mg, Oral, Nightly    spironolactone (ALDACTONE) 25 mg, Oral, Daily     Family History:  Family History   Problem Relation Age of Onset    Hypertension Mother     Hypertension Brother      Social History:  Social History     Tobacco Use    Smoking status: Never Smoker    Smokeless tobacco: Never Used   Substance Use Topics    Alcohol use: Yes     Alcohol/week: 70.0  "standard drinks     Types: 70 Standard drinks or equivalent per week     Comment: drinks between 8-10 beers daily    Drug use: No     Review of Systems:  Review of Systems   Constitutional: Negative for chills and fever.   HENT: Negative for congestion, sinus pain and sore throat.    Eyes: Negative for blurred vision and double vision.   Respiratory: Positive for cough. Negative for shortness of breath.    Cardiovascular: Negative for chest pain, palpitations, orthopnea and leg swelling.   Gastrointestinal: Negative for abdominal pain, diarrhea, heartburn, nausea and vomiting.   Genitourinary: Negative.    Musculoskeletal: Negative.    Skin: Negative for itching and rash.   Neurological: Positive for tremors, seizures and loss of consciousness.   Endo/Heme/Allergies: Negative.    Psychiatric/Behavioral: Positive for memory loss. Negative for depression, hallucinations and suicidal ideas. The patient is not nervous/anxious and does not have insomnia.         Reports 10-12 beers per day     All other systems reviewed and are negative.    Health Care Maintenance :   Primary Care Physician: Ben Brannon MD   Immunizations:   Immunization History   Administered Date(s) Administered    PPD Test 10/12/2020      Cancer Screening:  Colonoscopy: unknown    Objective:   Last 24 Hour Vital Signs:  BP  Min: 149/70  Max: 171/74  Temp  Av.7 °F (36.5 °C)  Min: 97.7 °F (36.5 °C)  Max: 97.7 °F (36.5 °C)  Pulse  Av.5  Min: 65  Max: 81  Resp  Av.8  Min: 18  Max: 29  SpO2  Av.3 %  Min: 95 %  Max: 99 %  Height  Av' 10" (177.8 cm)  Min: 5' 10" (177.8 cm)  Max: 5' 10" (177.8 cm)  Weight  Av.3 kg (219 lb)  Min: 96.6 kg (213 lb)  Max: 102.1 kg (225 lb)  Body mass index is 32.28 kg/m².  No intake/output data recorded.  Physical Examination:  Physical Exam   HENT:   Head: Normocephalic.   Nose: Nose normal.   Mouth/Throat: Mucous membranes are moist. Lacerations present. Oropharynx is clear.       Partial " thickness laceration to the left lateral tongue   Eyes: Pupils are equal, round, and reactive to light.   Cardiovascular: Normal rate, regular rhythm, normal heart sounds and normal pulses.   Pulmonary/Chest: Effort normal and breath sounds normal.   Abdominal: Soft. Normal appearance and bowel sounds are normal. He exhibits no distension. There is no abdominal tenderness.   Musculoskeletal:      Right lower leg: Edema present.      Left lower leg: Edema present.      Comments: Trace pitting edema     Neurological: He has normal sensation and normal reflexes. He is disoriented. He displays tremor. Coordination normal.   Skin: Skin is warm and dry. Capillary refill takes less than 2 seconds. Laceration noted.        Approximate 3cm laceration to the right shin   Psychiatric: His behavior is normal. Mood normal.      Laboratory:  Recent Labs   Lab 07/27/22  1619   WBC 9.79   HGB 13.9*   HCT 39.7*      MCV 91   RDW 12.2   *   K 3.6   CL 97   CO2 20*   BUN 6*   CREATININE 1.0   *   PROT 7.7   ALBUMIN 4.0   BILITOT 0.9   AST 34   ALKPHOS 74   ALT 23     Cardiac Data:  Recent Labs   Lab 07/27/22  1619   TROPONINI 0.006     Microbiology:  Microbiology Results (last 7 days)     ** No results found for the last 168 hours. **           Recent Labs   Lab 07/27/22  1633   COLORU Colorless*   SPECGRAV 1.005   PHUR 6.0   PROTEINUA Negative   NITRITE Negative   LEUKOCYTESUR Negative   UROBILINOGEN Negative       Other Results:  EKG (my interpretation): sinus 73BPM, minor left axis deviation    Radiology:  CT Head Without Contrast   Final Result      1. No acute intracranial process.   2. Involutional changes with chronic microvascular ischemic changes.  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         Electronically signed by: Vernon Torres   Date:    07/27/2022   Time:    17:54      X-Ray Tibia Fibula 2 View Right   Final Result      No acute osseous abnormality.         Electronically signed by: Abdulkadir  Noa   Date:    07/27/2022   Time:    17:51      X-Ray Chest AP Portable    (Results Pending)       Assessment:     Roshan Rankin is a 77 y.o. male with a PMHx of HTN, ETOH abuse (10-12 beers every day)c/b withdrawal provoked seizures (admit in 10/2020) presenting with witnessed seizure activity x1 with LOC, tongue biting, bladder incontinence. Patient was noticed to be mildly confused this AM by wife. States she went to work and was notified around 5PM that her  was at [facility] following witnessed seizure activity at the grocery store. Per patient, last alcoholic beverage was the night prior. CT head unremarkable. Valium given in ED. Keppra loaded, PRN ativan, and Neurology consulted this AM.      Plan:     Seizure likely Alcohol Withdrawal Provoked   #alcohol abuse disorder  - Witnessed seizure in grocery store w/ LOC, bladder incontinence, tongue biting  - Patient with history of extensive alcohol use (10-12 beers per day) with hx of withdrawal provoked seizures (admit in 10/2020)  - Last drink reported last night  - Tremulous on physical exam, remainder of neuro exam normal  - CT head unremarkable  - EKG and CXR also unremarkable   - Admit labs grossly unremarkable, ethanol <10mg/dL, troponin negative,   - Given valium in ED  - Keppra loaded in ED, will start 500mg BID in AM  - Ativan 2mg q4 PRN   - Neurology consult this AM  - Thiamine IV   - Folate 1mg PO  - IVF w/ LR  - Seizure/Aspiration precautions  - Neuro checks q4    Essential hypertension  - Continue home aldactone 25mg qd, lopressor 100mg BID,  Lasix 40mg qd    Health Care Maintenance   - A1c 6.4  - Lipid panel WNL, TSH WNL      DVT Ppx: LMWH  Diet: No diet orders on file  Code: Full Code    Disposition: pending seizure and alcohol withdrawal work up    Jamar Mullins M.D.  [facility] Neurology PGY-1    [facility] Medicine Hospitalist Pager numbers:   [facility] Hospitalist Medicine Team A (Branden/Miguel): [phone]  [facility] Hospitalist Medicine Team B  (Stefania/Grace):

## 2022-07-28 NOTE — PLAN OF CARE
Problem: Occupational Therapy  Goal: Occupational Therapy Goal  Outcome: Adequate for Care Transition     Pt performing ADLs and functional mobility at baseline. Pt does appear to have memory and processing deficits- also noted by wife. Discussed home safety. No further OT needs

## 2022-07-28 NOTE — PHARMACY MED REC
"[facility]           Pharmacy  Admission Medication History     The home medication history was taken by Terra Horner.      Medication history obtained from Medications listed below were obtained from: Patient/family    Based on information gathered for medication list, you may go to "Admission" then "Reconcile Home Medications" tabs to review and/or act upon those items.      The home medication list has been updated by the Pharmacy department.    Please read ALL comments highlighted in yellow.    Please address this information as you see fit.     Feel free to contact us if you have any questions or require assistance.    The medications listed below were removed from the home medication list.  Please reorder if appropriate:     Patient reports NOT TAKING the following medication(s):  o proventil hfa 90mcg  o norvasc 10mg  o Aspirin 81mg  o colcrys 0.6mg  o hctz 12.5mg  o protonix 40mg      No current facility-administered medications on file prior to encounter.     Current Outpatient Medications on File Prior to Encounter   Medication Sig Dispense Refill    ascorbate calcium (FLORENTIN-C ORAL) Take 1,000 mg by mouth once daily.      cholecalciferol, vitamin D3, (D3-2000) 50 mcg (2,000 unit) Cap capsule Take 2,000 Units by mouth once daily.      folic acid (FOLVITE) 1 MG tablet Take 1 mg by mouth once daily.      folic acid/multivit-min/lutein (CENTRUM SILVER ORAL) Take 1 tablet by mouth once daily.      furosemide (LASIX) 40 MG tablet Take 40 mg by mouth once daily.      latanoprost 0.005 % ophthalmic solution Place 1 drop into both eyes once daily.      metoprolol tartrate (LOPRESSOR) 100 MG tablet Take 100 mg by mouth 2 (two) times daily.      omeprazole (PRILOSEC) 40 MG capsule Take 40 mg by mouth once daily.      rosuvastatin (CRESTOR) 20 MG tablet Take 20 mg by mouth every evening.      spironolactone (ALDACTONE) 25 MG tablet Take 25 mg by mouth once daily.      " [DISCONTINUED] albuterol (PROVENTIL HFA) 90 mcg/actuation inhaler Inhale 2 puffs into the lungs every 6 (six) hours as needed for Wheezing or Shortness of Breath. Rescue 18 g 0    [DISCONTINUED] amlodipine (NORVASC) 10 MG tablet Take 10 mg by mouth once daily.      [DISCONTINUED] aspirin (ECOTRIN) 81 MG EC tablet Take 81 mg by mouth once daily.      [DISCONTINUED] colchicine (COLCRYS) 0.6 mg tablet Take 1 tablet (0.6 mg total) by mouth once daily. for 3 days 3 tablet 0    [DISCONTINUED] folic acid (FOLVITE) 1 MG tablet Take 1 tablet (1 mg total) by mouth once daily. 30 tablet 11    [DISCONTINUED] hydroCHLOROthiazide (MICROZIDE) 12.5 mg capsule Take 1 capsule (12.5 mg total) by mouth once daily. 30 capsule 11    [DISCONTINUED] metoprolol succinate (TOPROL-XL) 100 MG 24 hr tablet       [DISCONTINUED] omeprazole (PRILOSEC) 20 MG capsule omeprazole 20 mg capsule,delayed release   Take 1 capsule every day by oral route.      [DISCONTINUED] pantoprazole (PROTONIX) 40 MG tablet Take 1 tablet (40 mg total) by mouth once daily. for 7 days 7 tablet 0       Please address this information as you see fit.  Feel free to contact us if you have any questions or require assistance.    Terra Horner  [phone]                  .

## 2022-07-28 NOTE — DISCHARGE SUMMARY
[facility] Hospital Medicine Discharge Summary    Primary Team: [facility] Hospitalist Team A  Attending Physician: Miguel  Resident: Jaziel  Intern: Harpreet    Date of Admit: 7/27/2022  Date of Discharge: 7/28/2022    Discharge to: Home  Condition: Good    Discharge Diagnoses     Alcohol Use Disorder with Withdrawal Provoked Seizure    Consultants and Procedures     Consultants:   Neurology    Procedures:   None    Brief History of Present Illness & Summary of Hospital Course      Roshan Rankin is a 77 y.o. male with a PMHx of HTN, ETOH abuse (10-12 beers every day) c/b withdrawal provoked seizures (admit in 10/2020) presenting with witnessed seizure activity x1 with LOC, tongue biting, bladder incontinence. Patient was noticed to be mildly confused AM of incident by wife. States she went to work and was notified around 5PM that her  was at [facility] [place] witnessed seizure activity at the grocery store sometime between the hours of 1130am-345pm. Per patient, last alcoholic beverage was the night prior. CT head unremarkable. Valium given in ED. Admit labs grossly normal. Keppra loaded, benzo taper started. Neurology consulted and recommend follow up outpatient with continued Keppra for seizure prophylaxis until clinic visit.     For the full HPI please refer to the History & Physical from this admission.    Hospital Course By Problem with Pertinent Findings     Seizure likely Alcohol Withdrawal Provoked   #alcohol abuse disorder  - Witnessed seizure in grocery store w/ LOC, bladder incontinence, tongue biting  - Patient with history of extensive alcohol use (10-12 beers per day) with hx of withdrawal provoked seizures (admit in 10/2020)  - Last drink reported night prior to admission  - Tremulous on physical exam, remainder of neuro exam normal  - CT head unremarkable  - EKG and CXR also unremarkable   - Admit labs grossly unremarkable, ethanol <10mg/dL, troponin negative,   - Given valium in ED  - Started benzo  taper with valium on floor  - Keppra loaded in ED, 500mg BID started today  - Ativan 2mg q4 PRN   - Neurology consulted, recommend continuing keppra until outpatient appointment  - Thiamine IV with switch to PO on discharge  - Folate 1mg PO  - Given IVF w/ LR  - Tremor and disorientation abated this AM on exam  - Patient counseled on alcohol use and importance of cessation  - Neurology follow up in clinic    - No driving for at least 6 months after seizure per state law     Essential hypertension  - Home aldactone 25mg qd, lopressor 100mg BID,  Lasix 40mg qd  - Unclear via chart review reasoning for patient being on lasix and aldactone  - Stop lasix and aldactone   - Decrease lopressor to 50mg BID  - Follow up with PCP outpatient     Health Care Maintenance   - A1c 6.4  - Lipid panel WNL, TSH WNL      Discharge Medications        Medication List      START taking these medications    levETIRAcetam 500 MG Tab  Commonly known as: KEPPRA  Take 1 tablet (500 mg total) by mouth 2 (two) times daily.        CHANGE how you take these medications    metoprolol tartrate 50 MG tablet  Commonly known as: LOPRESSOR  Take 1 tablet (50 mg total) by mouth 2 (two) times daily.  What changed:   · medication strength  · how much to take        CONTINUE taking these medications    CENTRUM SILVER ORAL     D3-2000 50 mcg (2,000 unit) Cap capsule  Generic drug: cholecalciferol (vitamin D3)     FLORENTIN-C ORAL     folic acid 1 MG tablet  Commonly known as: FOLVITE     latanoprost 0.005 % ophthalmic solution     omeprazole 40 MG capsule  Commonly known as: PRILOSEC     rosuvastatin 20 MG tablet  Commonly known as: CRESTOR        STOP taking these medications    furosemide 40 MG tablet  Commonly known as: LASIX     spironolactone 25 MG tablet  Commonly known as: ALDACTONE           Where to Get Your Medications      These medications were sent to [facility]  [address], [place] [address]    Hours: Mon-Fri, 8a-5:30p  Phone: [phone]   · levETIRAcetam 500 MG Tab  · metoprolol tartrate 50 MG tablet          Discharge Information:   Diet:  No diet orders on file    Physical Activity:  As tolerated             Instructions:  1. Take all medications as prescribed  2. Keep all follow-up appointments  3. Return to the hospital or call your primary care physicians if any worsening symptoms such as fever, chest pain, shortness of breath, return of symptoms, or any other concerns.    Follow-Up Appointments:  PCP  Neurology         Less than 30 minutes were spent coordinating discharge for this patient.     Jamar Mullins M.D.  [facility] PGY-1

## 2022-07-28 NOTE — PT/OT/SLP EVAL
Occupational Therapy   Evaluation/Treatment/Dc Summary     Name: Roshan Rankin  MRN: [id number]  Admitting Diagnosis:  Seizure  Recent Surgery: * No surgery found *      Recommendations:     Discharge Recommendations: home  Discharge Equipment Recommendations:  none  Barriers to discharge:  None    Assessment:     Roshan Rankin is a 77 y.o. male with a medical diagnosis of Seizure.  He presents with The primary encounter diagnosis was Seizure. Diagnoses of Trauma, Alcohol withdrawal seizure with delirium, Alcohol withdrawal syndrome with complication, Alcohol use disorder, moderate, dependence, and Primary hypertension were also pertinent to this visit.  . Performance deficits affecting function: impaired cognition, impaired sensation, decreased safety awareness, edema, pain, impaired skin.      Pt performing ADLs and functional mobility at baseline. Pt does appear to have memory and processing deficits- also noted by wife. Discussed home safety. No further OT needs     Rehab Prognosis: Good; patient would benefit from acute skilled OT services to address these deficits and reach maximum level of function.       Plan:     Patient to be seen  (d/c OT) to address the above listed problems via    · Plan of Care Expires: 07/28/22  · Plan of Care Reviewed with: patient, spouse    Subjective     Chief Complaint: pt without complaints, but states he feels 85% of his normal; wife reports impaired cognition   Patient/Family Comments/goals: improve cognition     Occupational Profile:  Living Environment: with spouse in [facility], no steps to enter, WIS with bench   Previous level of function: independent; drives; plays golf   Equipment Used at Home:  none  Assistance upon Discharge: from spouse, however she works 3 days/wk     Pain/Comfort:  · Pain Rating 1:  (R leg soreness from laceration)  · Pain Addressed 1: Reposition, Distraction    Patients cultural, spiritual, Episcopal conflicts given the current situation:      Objective:      Communicated with: isabela prior to session.  General Precautions: Standard, fall   Orthopedic Precautions:N/A   Braces: N/A    Occupational Performance:    Bed Mobility:    · Patient completed Scooting/Bridging with modified independence  · Patient completed Supine to Sit with modified independence  · Patient completed Sit to Supine with modified independence    Functional Mobility/Transfers:  · Patient completed Sit <> Stand Transfer with modified independence  with  no assistive device   · Functional Mobility: supervision     Activities of Daily Living:  · Lower Body Dressing: modified independence while seated EOB     Cognitive/Visual Perceptual:  Oriented but impaired memory   Delayed processing   Impaired higher level cognition     Physical Exam:  Balance:    -       WFL seated; fair + standing   Postural examination/scapula alignment:    -       Rounded shoulders  Skin integrity: Wound R shin   Sensation:  Impaired L foot   Upper Extremity Range of Motion:   BUE WFL   Upper Extremity Strength:  BUE WFL    Strength:  BUE WFL     AMPAC 6 Click ADL:  AMPAC Total Score: 23    Treatment & Education:  Pt performing ax as above   Discussed home safety concerns with spouse   Answered spouse's concerns to best of knowledge regarding impaired cognition   Education:    Patient left supine with all lines intact, call button in reach, bed alarm on, nsg notified and spouse  present    GOALS:   Multidisciplinary Problems     Occupational Therapy Goals        Problem: Occupational Therapy    Goal Priority Disciplines Outcome Interventions   Occupational Therapy Goal     OT, PT/OT Adequate for Care Transition                    History:     Past Medical History:   Diagnosis Date    Alcohol abuse     Arthritis     Convulsion 10/7/2020    GERD (gastroesophageal reflux disease)     Glaucoma     Hypertension        Past Surgical History:   Procedure Laterality Date    KNEE CARTILAGE SURGERY Right        Time Tracking:      OT Date of Treatment: 07/28/22  OT Start Time: 1112  OT Stop Time: 1129  OT Total Time (min): 17 min    Billable Minutes:Evaluation 8  Therapeutic Activity 9    7/28/2022

## 2022-07-28 NOTE — PLAN OF CARE
07/27/22 2212   Admission   Initial VN Admission Questions Complete   Communication Issues? None   Shift   Virtual Nurse - Rounding Complete   Pain Management Interventions relaxation techniques promoted;quiet environment facilitated   Virtual Nurse - Patient Verbalized Approval Of VN Rounding;Camera Use   Type of Frequent Check   Type Patient Rounds;Telemetry Monitoring   Safety/Activity   Patient Rounds bed in low position;placement of personal items at bedside;call light in patient/parent reach;clutter free environment maintained;ID band on;bed wheels locked   Safety Promotion/Fall Prevention assistive device/personal item within reach;bed alarm set   Positioning   Body Position position changed independently   Head of Bed (HOB) Positioning HOB elevated   Positioning/Transfer Devices pillows;in use    VN cued into room via telephone to complete admit assessment. VIP model introduced; VN working alongside bedside treatment team.  Plan of care reviewed with patient. Patient informed of fall risk, fall precautions, call light within reach, side rails x2 elevated. Patient notified to ask staff for assistance. Patient verbalized complete understanding. Time allowed for questions. Will continue to monitor and intervene as needed.

## 2022-07-28 NOTE — PLAN OF CARE
"  Problem: Physical Therapy  Goal: Physical Therapy Goal  Outcome: Met     PT Eval completed, note to follow. Pt ambulated ~200 ft with no AD and with mostly Supervision - pt requiring CGA for steadying for one instance where pt was veering to his R side and but was able to mostly self-correct. Pt reported it was because he "wasn't paying attention." Pt reports he is at baseline with ambulation. No LOB noted. Pt demonstrating impaired memory. Recommending d/c home with family assist as needed. No DME needs. No further skilled acute PT needs identified. D/c PT.   "

## 2022-07-28 NOTE — PROGRESS NOTES
"[facility] Medicine Progress Note    Primary Team: [facility] Hospitalist Team A  Attending Physician: Siri Rivera MD  Resident:   Intern: [facility] Day: 0     Chief Complaint: Witnessed Seizure  x1  Subjective:      Patient seen and examined by me this morning. No complaints overnight or at this time. Tremulousness and disorientation has abated this AM.     Review of Systems   Constitutional: Negative for chills and fever.   Respiratory: Negative for cough and shortness of breath.    Cardiovascular: Negative for chest pain and palpitations.   Gastrointestinal: Negative for abdominal pain, diarrhea, nausea and vomiting.   Genitourinary: Flank pain:      Neurological: Negative for dizziness and headaches.   All other systems reviewed and are negative.     Past Medical History:   Diagnosis Date    Alcohol abuse     Arthritis     Convulsion 10/7/2020    GERD (gastroesophageal reflux disease)     Glaucoma     Hypertension               Objective:   Last 24 Hour Vital Signs:  BP  Min: 136/65  Max: 171/74  Temp  Av.9 °F (36.6 °C)  Min: 97.1 °F (36.2 °C)  Max: 98.8 °F (37.1 °C)  Pulse  Av.4  Min: 56  Max: 81  Resp  Av.6  Min: 16  Max: 29  SpO2  Av.7 %  Min: 95 %  Max: 99 %  Height  Av' 10" (177.8 cm)  Min: 5' 10" (177.8 cm)  Max: 5' 10" (177.8 cm)  Weight  Av.3 kg (223 lb 3.8 oz)  Min: 96.6 kg (213 lb)  Max: 105.1 kg (231 lb 11.3 oz)  No intake or output data in the 24 hours ending 22 0749   Physical Examination:     Physical Exam   HENT:   Head: Normocephalic.   Nose: Nose normal.   Mouth/Throat: Mucous membranes are moist. Lacerations present. Oropharynx is clear.       Partial thickness laceration to the left lateral tongue   Eyes: Pupils are equal, round, and reactive to light.   Cardiovascular: Normal rate, regular rhythm, normal heart sounds and normal pulses.   Pulmonary/Chest: Effort normal and breath sounds normal.   Abdominal: Soft. Normal appearance and bowel " sounds are normal. He exhibits no distension. There is no abdominal tenderness.   Musculoskeletal:      Right lower leg: Edema present.      Left lower leg: Edema present.      Comments: Trace pitting edema     Neurological: He has normal sensation and normal reflexes. He is oriented x3 today and his tremor has abated  Skin: Skin is warm and dry. Capillary refill takes less than 2 seconds. Laceration noted.        Approximate 3cm laceration to the right shin   Psychiatric: His behavior is normal. Mood normal.         Laboratory:  Recent Labs   Lab 07/27/22  1619   WBC 9.79   HGB 13.9*   HCT 39.7*      MCV 91   RDW 12.2   *   K 3.6   CL 97   CO2 20*   BUN 6*   CREATININE 1.0   *   PROT 7.7   ALBUMIN 4.0   BILITOT 0.9   AST 34   ALKPHOS 74   ALT 23       Urinalysis  Recent Labs   Lab 07/27/22  1633   COLORU Colorless*   SPECGRAV 1.005   PHUR 6.0   PROTEINUA Negative   NITRITE Negative   LEUKOCYTESUR Negative   UROBILINOGEN Negative       Microbiology Results (last 7 days)     ** No results found for the last 168 hours. **           I have personally reviewed the above laboratory studies.     Imaging:  EKG (my interpretation): No new today    X-Ray Chest AP Portable   Final Result      No acute cardiopulmonary abnormality.         Electronically signed by: Abdulkadir Latham   Date:    07/27/2022   Time:    20:38      CT Head Without Contrast   Final Result      1. No acute intracranial process.   2. Involutional changes with chronic microvascular ischemic changes.  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         Electronically signed by: Vernon Torres   Date:    07/27/2022   Time:    17:54      X-Ray Tibia Fibula 2 View Right   Final Result      No acute osseous abnormality.         Electronically signed by: Abdulkadir Latham   Date:    07/27/2022   Time:    17:51          Current Medications:     Scheduled:   atorvastatin  80 mg Oral Daily    diazePAM  10 mg Oral Q6H    diazePAM  10 mg Oral Q8H     enoxaparin  40 mg Subcutaneous Daily    folic acid  1 mg Oral Daily    furosemide  40 mg Oral Daily    levETIRAcetam  500 mg Oral BID    LIDOcaine 2%/EPINEPHrine 1:100,000  1 mL Intradermal Once    metoprolol tartrate  100 mg Oral BID    multivitamin  1 tablet Oral Daily    pantoprazole  40 mg Oral Daily    spironolactone  25 mg Oral Daily    thiamine (VITAMIN B1) IVPB  500 mg Intravenous TID         Infusions:       PRN:  lorazepam, melatonin, sars-cov-2 (covid-19), sodium chloride 0.9%  Antibiotics and Day Number of Therapy:  Antibiotics (From admission, onward)            None             Assessment:     Roshan Rankin is a 77 y.o. male with a PMHx of HTN, ETOH abuse (10-12 beers every day)c/b withdrawal provoked seizures (admit in 10/2020) presenting with witnessed seizure activity x1 with LOC, tongue biting, bladder incontinence. Patient was noticed to be mildly confused this AM by wife. States she went to work and was notified around 5PM that her  was at [facility] following witnessed seizure activity at the grocery store. Per patient, last alcoholic beverage was the night prior. CT head unremarkable. Valium given in ED. Keppra loaded, PRN ativan, and Neurology consulted this AM.     Plan:     Seizure likely Alcohol Withdrawal Provoked   #alcohol abuse disorder  - Witnessed seizure in grocery store w/ LOC, bladder incontinence, tongue biting  - Patient with history of extensive alcohol use (10-12 beers per day) with hx of withdrawal provoked seizures (admit in 10/2020)  - Workup for previous suspected seizure activity w/ EEG demonstrating mild generalized cerebral dysfunction, no AED warranted at that time per neurology  - Last drink reported last night  - Tremulous on physical exam, remainder of neuro exam normal  - CT head unremarkable  - EKG and CXR also unremarkable   - Admit labs grossly unremarkable, ethanol <10mg/dL, troponin negative,   - Given valium in ED  - Keppra loaded in ED,  will start 500mg BID in AM  - Ativan 2mg q4 PRN  - Neurology consult this AM  - Thiamine IV   - Folate 1mg PO  - IVF w/ LR  - Seizure/Aspiration precautions  - Neuro checks q4     Essential hypertension  - Continue home aldactone 25mg qd, lopressor 100mg BID,  Lasix 40mg qd     Health Care Maintenance   - A1c 6.4  - Lipid panel WNL, TSH WNL     Diet: Diet Cardiac   DVT Prophylaxis: LMWH  Code: Full Code     Dispo: pending resolution of alcohol withdrawal    Jamar Mullins M.D.  [facility] Neurology PGY-1    [facility] Medicine Hospitalist Pager numbers:   [facility] Hospitalist Medicine Team A (Branden/Miguel): [phone]  [facility] Hospitalist Medicine Team B (Stefania/Grace):  [phone]

## 2022-07-28 NOTE — PLAN OF CARE
Discharge orders noted. PCP and Neurology follow-up appointments requested. Patient will be contacted with follow-up information. No further  needs.    I called patient's wife and reviewed follow-up information. She verbalized understanding.     8/3--Left message for patient's wife to inform of neuro follow-up scheduled information. I contacted patient's  to notify him of appointment. He stated he will tell his wife to listen to her voicemail with follow-up information. I also reviewed this with patient. Update: Patient's wife called me back and follow-up information reviewed.    Patient Contacts    Name Relation Home Work Mobile   Rona Rankin Spouse  [phone]   Staci Paredes Daughter [phone]     [facility] - [id number], [address] [place] [address]Jako Steps: Follow up in 2 week(s)Appointment: Instructions: Hospital Follow-Up You will be contacted with follow-up information.     MD Haley Tim, [facility] - GeneralInternal [phone] [facility] [address] [place] [address]Qegd Steps: Follow up on 8/4/2022Appointment: Instructions: at 2:30 pm, Primary Care Physician    Future Appointments   Date Time Provider Department Center   9/22/2022  8:40 AM Catia Aguillon MD [facility]        07/28/22 1443   Final Note   Assessment Type Final Discharge Note   Anticipated Discharge Disposition Home   Hospital Resources/Appts/Education Provided Appointments scheduled by Navigator/Coordinator   Post-Acute Status   Discharge Delays None known at this time     Allison Olivares RN    [phone]

## 2022-07-29 NOTE — CONSULTS
NEUROLOGY FLOOR CONSULT    Reason for consult:  seizure    HPI:     78 yo M with hx alcohol abuse, withdrawal seizure from alcohol (2020) presented 7/27/22 with his second lifetime seizure, also in the setting of alcohol withdrawal. Drinks 8-9 beers daily, last drink was over 24 hours from seizure. It was unwitnessed by family, occurred at a [employer] where EMS was called. Event not recalled by patient.            Histories:     Allergies:  Patient has no known allergies.    Current Medications:    No current facility-administered medications for this encounter.     Current Outpatient Medications   Medication Sig Dispense Refill    ascorbate calcium (FLORENTIN-C ORAL) Take 1,000 mg by mouth once daily.      cholecalciferol, vitamin D3, (D3-2000) 50 mcg (2,000 unit) Cap capsule Take 2,000 Units by mouth once daily.      folic acid (FOLVITE) 1 MG tablet Take 1 mg by mouth once daily.      folic acid/multivit-min/lutein (CENTRUM SILVER ORAL) Take 1 tablet by mouth once daily.      latanoprost 0.005 % ophthalmic solution Place 1 drop into both eyes once daily.      omeprazole (PRILOSEC) 40 MG capsule Take 40 mg by mouth once daily.      rosuvastatin (CRESTOR) 20 MG tablet Take 20 mg by mouth every evening.      levETIRAcetam (KEPPRA) 500 MG Tab Take 1 tablet (500 mg total) by mouth 2 (two) times daily. 60 tablet 11    metoprolol tartrate (LOPRESSOR) 50 MG tablet Take 1 tablet (50 mg total) by mouth 2 (two) times daily. 60 tablet 11       Past Medical/Surgical/Family History:  Medical:   Past Medical History:   Diagnosis Date    Alcohol abuse     Arthritis     Convulsion 10/7/2020    GERD (gastroesophageal reflux disease)     Glaucoma     Hypertension       Surgeries:   Past Surgical History:   Procedure Laterality Date    KNEE CARTILAGE SURGERY Right       Family:   Family History   Problem Relation Age of Onset    Hypertension Mother     Hypertension Brother          Current Evaluation:     Vital Signs:    Vitals:    07/28/22 1200   BP:    Pulse: (!) 58   Resp:    Temp:         Neurological Examination   Mental status  AAOx4  Language is fluent with intact comprehension  Working and remote memory intact    Cranial nerves   Pupils symmetric; VFs full  EOMIs  No facial asymmetry  Symmetric shoulder shrug  Tongue midline without deviations  Extensive tongue lacerations    Motor  Normal, symmetric bulk tone   No pronator drift     Sensory  SILT           LABORATORY STUDIES:  CPK elevated on arrival      RADIOLOGY STUDIES:  I have personally reviewed the images performed.     CT head -- diffuse atrophy; no acute changes      Assessment:  P     76 yo M with hx alcohol abuse, withdrawal seizure from alcohol (2020) presented 7/27/22 with his second lifetime seizure, also in the setting of alcohol withdrawal.    Impression: provoked seizure in the setting of alcohol withdrawal     Recommendations   -continue Keppra 500 mg BID until able to be seen by Neurology in clinic  -informed pt not to drive for at least 6 months after seizure per state law; he verbalized understanding  -ambulatory neurology referral      Differential diagnosis was explained to the patient. All questions were answered. Patient understood and agreed to adhere to plan.       Case discussed with Dr. Aguillon.     Primo Tran DO  [facility] Neu[facility], PGY-IV

## 2022-08-04 DIAGNOSIS — F10.20 ALCOHOL DEPENDENCE: Primary | ICD-10-CM

## 2022-08-15 ENCOUNTER — HOSPITAL ENCOUNTER (OUTPATIENT)
Dept: RADIOLOGY | Facility: HOSPITAL | Age: 77
Discharge: HOME | End: 2022-08-15
Attending: STUDENT IN AN ORGANIZED HEALTH CARE EDUCATION/TRAINING PROGRAM
Payer: MEDICARE

## 2022-08-15 DIAGNOSIS — F10.20 ALCOHOL DEPENDENCE: ICD-10-CM

## 2022-08-15 PROCEDURE — 76705 US ABDOMEN LIMITED: ICD-10-PCS | Mod: 26,,, | Performed by: RADIOLOGY

## 2022-08-15 PROCEDURE — 76705 ECHO EXAM OF ABDOMEN: CPT | Mod: TC

## 2022-08-15 PROCEDURE — 76705 ECHO EXAM OF ABDOMEN: CPT | Mod: 26,,, | Performed by: RADIOLOGY

## 2022-09-22 ENCOUNTER — LAB VISIT (OUTPATIENT)
Dept: LAB | Facility: HOSPITAL | Age: 77
End: 2022-09-22
Attending: PSYCHIATRY & NEUROLOGY
Payer: MEDICARE

## 2022-09-22 ENCOUNTER — OFFICE VISIT (OUTPATIENT)
Dept: NEUROLOGY | Facility: CLINIC | Age: 77
End: 2022-09-22
Payer: MEDICARE

## 2022-09-22 ENCOUNTER — PATIENT MESSAGE (OUTPATIENT)
Dept: NEUROLOGY | Facility: CLINIC | Age: 77
End: 2022-09-22

## 2022-09-22 VITALS
HEIGHT: 70 IN | BODY MASS INDEX: 34.49 KG/M2 | SYSTOLIC BLOOD PRESSURE: 171 MMHG | HEART RATE: 66 BPM | WEIGHT: 240.94 LBS | DIASTOLIC BLOOD PRESSURE: 88 MMHG

## 2022-09-22 DIAGNOSIS — R29.2 ABNORMAL REFLEXES: ICD-10-CM

## 2022-09-22 DIAGNOSIS — R56.9 SEIZURE: ICD-10-CM

## 2022-09-22 DIAGNOSIS — F10.939 ALCOHOL WITHDRAWAL SYNDROME WITH COMPLICATION: ICD-10-CM

## 2022-09-22 DIAGNOSIS — R56.9 SEIZURE: Primary | ICD-10-CM

## 2022-09-22 DIAGNOSIS — R56.9 CONVULSIONS, UNSPECIFIED CONVULSION TYPE: ICD-10-CM

## 2022-09-22 LAB
ANION GAP SERPL CALC-SCNC: 7 MMOL/L (ref 8–16)
BUN SERPL-MCNC: 7 MG/DL (ref 8–23)
CALCIUM SERPL-MCNC: 9.1 MG/DL (ref 8.7–10.5)
CHLORIDE SERPL-SCNC: 101 MMOL/L (ref 95–110)
CO2 SERPL-SCNC: 29 MMOL/L (ref 23–29)
CREAT SERPL-MCNC: 0.9 MG/DL (ref 0.5–1.4)
CREAT SERPL-MCNC: 0.9 MG/DL (ref 0.5–1.4)
EST. GFR  (NO RACE VARIABLE): >60 ML/MIN/1.73 M^2
EST. GFR  (NO RACE VARIABLE): >60 ML/MIN/1.73 M^2
ESTIMATED AVG GLUCOSE: 111 MG/DL (ref 68–131)
GLUCOSE SERPL-MCNC: 107 MG/DL (ref 70–110)
HBA1C MFR BLD: 5.5 % (ref 4–5.6)
POTASSIUM SERPL-SCNC: 3.8 MMOL/L (ref 3.5–5.1)
SODIUM SERPL-SCNC: 137 MMOL/L (ref 136–145)

## 2022-09-22 PROCEDURE — 3079F DIAST BP 80-89 MM HG: CPT | Mod: CPTII,S$GLB,, | Performed by: PSYCHIATRY & NEUROLOGY

## 2022-09-22 PROCEDURE — 1126F AMNT PAIN NOTED NONE PRSNT: CPT | Mod: CPTII,S$GLB,, | Performed by: PSYCHIATRY & NEUROLOGY

## 2022-09-22 PROCEDURE — 99215 OFFICE O/P EST HI 40 MIN: CPT | Mod: S$GLB,,, | Performed by: PSYCHIATRY & NEUROLOGY

## 2022-09-22 PROCEDURE — 3079F PR MOST RECENT DIASTOLIC BLOOD PRESSURE 80-89 MM HG: ICD-10-PCS | Mod: CPTII,S$GLB,, | Performed by: PSYCHIATRY & NEUROLOGY

## 2022-09-22 PROCEDURE — 82607 VITAMIN B-12: CPT | Performed by: PSYCHIATRY & NEUROLOGY

## 2022-09-22 PROCEDURE — 99215 PR OFFICE/OUTPT VISIT, EST, LEVL V, 40-54 MIN: ICD-10-PCS | Mod: S$GLB,,, | Performed by: PSYCHIATRY & NEUROLOGY

## 2022-09-22 PROCEDURE — 99999 PR PBB SHADOW E&M-EST. PATIENT-LVL IV: CPT | Mod: PBBFAC,,, | Performed by: PSYCHIATRY & NEUROLOGY

## 2022-09-22 PROCEDURE — 83036 HEMOGLOBIN GLYCOSYLATED A1C: CPT | Performed by: PSYCHIATRY & NEUROLOGY

## 2022-09-22 PROCEDURE — 3288F FALL RISK ASSESSMENT DOCD: CPT | Mod: CPTII,S$GLB,, | Performed by: PSYCHIATRY & NEUROLOGY

## 2022-09-22 PROCEDURE — 83921 ORGANIC ACID SINGLE QUANT: CPT | Performed by: PSYCHIATRY & NEUROLOGY

## 2022-09-22 PROCEDURE — 99999 PR PBB SHADOW E&M-EST. PATIENT-LVL IV: ICD-10-PCS | Mod: PBBFAC,,, | Performed by: PSYCHIATRY & NEUROLOGY

## 2022-09-22 PROCEDURE — 1101F PR PT FALLS ASSESS DOC 0-1 FALLS W/OUT INJ PAST YR: ICD-10-PCS | Mod: CPTII,S$GLB,, | Performed by: PSYCHIATRY & NEUROLOGY

## 2022-09-22 PROCEDURE — 1126F PR PAIN SEVERITY QUANTIFIED, NO PAIN PRESENT: ICD-10-PCS | Mod: CPTII,S$GLB,, | Performed by: PSYCHIATRY & NEUROLOGY

## 2022-09-22 PROCEDURE — 3288F PR FALLS RISK ASSESSMENT DOCUMENTED: ICD-10-PCS | Mod: CPTII,S$GLB,, | Performed by: PSYCHIATRY & NEUROLOGY

## 2022-09-22 PROCEDURE — 1159F MED LIST DOCD IN RCRD: CPT | Mod: CPTII,S$GLB,, | Performed by: PSYCHIATRY & NEUROLOGY

## 2022-09-22 PROCEDURE — 3077F PR MOST RECENT SYSTOLIC BLOOD PRESSURE >= 140 MM HG: ICD-10-PCS | Mod: CPTII,S$GLB,, | Performed by: PSYCHIATRY & NEUROLOGY

## 2022-09-22 PROCEDURE — 3077F SYST BP >= 140 MM HG: CPT | Mod: CPTII,S$GLB,, | Performed by: PSYCHIATRY & NEUROLOGY

## 2022-09-22 PROCEDURE — 80048 BASIC METABOLIC PNL TOTAL CA: CPT | Performed by: PSYCHIATRY & NEUROLOGY

## 2022-09-22 PROCEDURE — 1159F PR MEDICATION LIST DOCUMENTED IN MEDICAL RECORD: ICD-10-PCS | Mod: CPTII,S$GLB,, | Performed by: PSYCHIATRY & NEUROLOGY

## 2022-09-22 PROCEDURE — 1101F PT FALLS ASSESS-DOCD LE1/YR: CPT | Mod: CPTII,S$GLB,, | Performed by: PSYCHIATRY & NEUROLOGY

## 2022-09-22 RX ORDER — MIDAZOLAM 5 MG/.1ML
SPRAY NASAL
Qty: 4 EACH | Refills: 1 | Status: SHIPPED | OUTPATIENT
Start: 2022-09-22

## 2022-09-22 RX ORDER — FUROSEMIDE 20 MG/1
20 TABLET ORAL ONCE
COMMUNITY

## 2022-09-22 RX ORDER — DIAZEPAM 10 MG/100UL
SPRAY NASAL
Qty: 2 EACH | Refills: 1 | Status: SHIPPED | OUTPATIENT
Start: 2022-09-22 | End: 2022-09-22

## 2022-09-22 NOTE — PROGRESS NOTES
Subjective:       Patient ID: Roshan Rankin is a 77 y.o. male.    Chief Complaint: Seizures      [facility] f/u, here with his wife.   No further szrs  No SE of keppra, 500mg BID  Etoh, no change----12 beers a day. Per pt and wife, never seems intoxicated at all.   Has no new symptoms.     First event---10-20-20, his wife found him on the floor said he was sleeping there ( was confused),.Got him up , went to watch TV and she heard a thud, found on the floor shaking. Went to hospital where had another witnessed convulsion        2nd event---see consult        MRI Brain Epilepsy W W/O Contrast  Narrative & Impression  EXAMINATION:  MRI BRAIN EPILEPSY W W/O CONTRAST     CLINICAL HISTORY:  seizures;     TECHNIQUE:  Sagittal 3D space FLAIR and sagittal 3D  T1 MP rage which were reformatted in the coronal and sagittal planes.  Axial T2, axial gradient, axial T1 and axial diffusion imaging of the whole brain without contrast.  In addition coronal thin section high-resolution T2 imaging in oblique plane was performed without contrast.  Following contrast administration axial T1 and sagittal T1 3D spoiled gradient which was reformatted in the coronal and axial planes were performed.  10 ML Gadavist intravenous contrast.     COMPARISON:  CT 10/06/2020     FINDINGS:  Generalized cerebral volume loss with compensatory enlargement ventricles sulci and cisterns without hydrocephalus.  Focal T2 FLAIR signal hyperintensity within the right thalamus corresponding to hypodensity seen on CT without diffusion abnormality or enhancement compatible with remote lacunar-type infarction.  There is slight motion distortion of the examination.  No abnormal parenchymal susceptibility to suggest parenchymal hemorrhage allowing for motion limitation.     There is no abnormal parenchymal enhancement.  There is numerous scattered patchy and confluent foci of T2 FLAIR signal hyperintensity supratentorial white matter while nonspecific concerning  for moderate degree of chronic microvascular ischemic change.     Question slight asymmetrical volume loss right mesial temporal lobe compared the left although no significant asymmetrical signal abnormality cannot exclude right mesial temporal sclerosis in light of history of seizure activity.  Clinical correlation correlation with EEG findings advised.     This report was flagged in Epic as abnormal.     Impression:     Generalized cerebral volume loss slightly advanced for age with patchy and confluent regions of T2 FLAIR signal abnormality supratentorial white matter while nonspecific concerning for moderate degree of chronic microvascular ischemic change.     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     No evidence for acute infarction or intracranial enhancing mass lesion.     There is slight asymmetrical volume loss in the mesial temporal lobes right being slightly greater than left although no significant asymmetrical signal abnormality can not exclude right mesial temporal sclerosis.  Clinical correlation and correlation with EEG advised.        Electronically signed by: Evert Priest DO  Date:                                            10/07/2020  Time:                                           21:32        Exam Ended: 10/07/20 20:21 Last Resulted: 10/07/20 21:32            Past Medical History:   Diagnosis Date    Alcohol abuse     Arthritis     Convulsion 10/7/2020    GERD (gastroesophageal reflux disease)     Glaucoma     Hypertension       Past Surgical History:   Procedure Laterality Date    KNEE CARTILAGE SURGERY Right         Current Outpatient Medications:     cholecalciferol, vitamin D3, (VITAMIN D3) 50 mcg (2,000 unit) Cap capsule, Take 2,000 Units by mouth once daily., Disp: , Rfl:     folic acid (FOLVITE) 1 MG tablet, Take 1 mg by mouth once daily., Disp: , Rfl:     furosemide (LASIX) 20 MG tablet, Take 20 mg by mouth once. Pt is taking half tablet, Disp: , Rfl:      latanoprost 0.005 % ophthalmic solution, Place 1 drop into both eyes once daily., Disp: , Rfl:     levETIRAcetam (KEPPRA) 500 MG Tab, Take 1 tablet (500 mg total) by mouth 2 (two) times daily., Disp: 60 tablet, Rfl: 11    metoprolol tartrate (LOPRESSOR) 50 MG tablet, Take 1 tablet (50 mg total) by mouth 2 (two) times daily., Disp: 60 tablet, Rfl: 11    omeprazole (PRILOSEC) 40 MG capsule, Take 40 mg by mouth once daily., Disp: , Rfl:     rosuvastatin (CRESTOR) 20 MG tablet, Take 20 mg by mouth every evening., Disp: , Rfl:     ascorbate calcium (FLORENTIN-C ORAL), Take 1,000 mg by mouth once daily., Disp: , Rfl:     folic acid/multivit-min/lutein (CENTRUM SILVER ORAL), Take 1 tablet by mouth once daily., Disp: , Rfl:    Review of patient's allergies indicates:  No Known Allergies     Review of Systems   Neurological:  Negative for syncope, numbness, headaches and memory loss.   Psychiatric/Behavioral:  Negative for dysphoric mood and sleep disturbance. The patient is not nervous/anxious.          Objective:      Physical Exam  Constitutional:       Appearance: He is obese. He is not ill-appearing.   Neurological:      Mental Status: He is alert.      Cranial Nerves: No dysarthria.      Motor: Tremor (mild resting tremor of both hands) present. No weakness.      Comments: Areflexic   Psychiatric:         Mood and Affect: Mood normal.         Behavior: Behavior normal.         Thought Content: Thought content normal.         Assessment:       1. Seizure    2. Alcohol withdrawal syndrome with complication    3. Convulsions, unspecified convulsion type    4. Abnormal reflexes          Plan:              Here for f/u after having 2nd of 2 szr events. The first was a cluster witnessed at home, in  ( no ASM prescribed),and the recent event occurred 7-27-22 while at [facility] ( no first hand witness account available). Both times etoh negative on admit, whereas drinks 12 beers daily, every day.   No other symptoms  Exam  "notable only for areflexia ( likely due to etoh but plan lab today), slight shuffling gait and very mild resting tremor of lt hand. His wife states the gait is 100% his typical gait he has always had, sees no change.  Plan to continue keppra as this was the 2nd event, and also given that despite the negative etoh level he had beer the day of the events ( according to pt's wife), and otherwise did not have a withdrawal syndrome ( not sure if recv'd benzos either admission). Other risk factors inlcude vascular changes by MRI 2020 and remote and minor concussion playing football in high school.   Will adjust dosage after today's level.   MRI brain and EEG  No driving from 6 months from 7-27-22    Strongly recommend addiction medicine consult but pt is not interested, does not see an alcohol problem and wants 'to think about it"      /88--his wife checks it weekly and says always 102's/80's              Catia Aguillon MD   09/22/2022   9:04 AM       "

## 2022-09-23 LAB — VIT B12 SERPL-MCNC: 172 NG/L (ref 180–914)

## 2022-09-26 ENCOUNTER — TELEPHONE (OUTPATIENT)
Dept: NEUROLOGY | Facility: CLINIC | Age: 77
End: 2022-09-26
Payer: MEDICARE

## 2022-09-26 DIAGNOSIS — E53.8 B12 DEFICIENCY: Primary | ICD-10-CM

## 2022-09-26 NOTE — TELEPHONE ENCOUNTER
----- Message from Catia Aguillon MD sent at 9/23/2022  3:26 PM CDT -----  Regarding: B12  Pls inform needs B12 IM, weekly times 4 then monthly. Offer in the office. cg

## 2022-09-27 LAB — METHYLMALONATE SERPL-SCNC: 0.1 NMOL/ML

## 2022-09-27 RX ORDER — CYANOCOBALAMIN 1000 UG/ML
1000 INJECTION, SOLUTION INTRAMUSCULAR; SUBCUTANEOUS
Status: SHIPPED | OUTPATIENT
Start: 2022-09-28

## 2022-09-28 ENCOUNTER — TELEPHONE (OUTPATIENT)
Dept: ADMINISTRATIVE | Facility: OTHER | Age: 77
End: 2022-09-28
Payer: MEDICARE

## 2022-09-29 ENCOUNTER — TELEPHONE (OUTPATIENT)
Dept: ADMINISTRATIVE | Facility: OTHER | Age: 77
End: 2022-09-29
Payer: MEDICARE

## 2022-10-21 ENCOUNTER — HOSPITAL ENCOUNTER (OUTPATIENT)
Dept: NEUROLOGY | Facility: CLINIC | Age: 77
Discharge: HOME | End: 2022-10-21
Payer: MEDICARE

## 2022-10-21 DIAGNOSIS — R56.9 SEIZURE: ICD-10-CM

## 2022-10-21 PROCEDURE — 95819 PR EEG,W/AWAKE & ASLEEP RECORD: ICD-10-PCS | Mod: S$GLB,,, | Performed by: PSYCHIATRY & NEUROLOGY

## 2022-10-21 PROCEDURE — 95819 EEG AWAKE AND ASLEEP: CPT | Mod: S$GLB,,, | Performed by: PSYCHIATRY & NEUROLOGY

## 2022-10-22 ENCOUNTER — HOSPITAL ENCOUNTER (OUTPATIENT)
Dept: RADIOLOGY | Facility: HOSPITAL | Age: 77
Discharge: HOME | End: 2022-10-22
Attending: PSYCHIATRY & NEUROLOGY
Payer: MEDICARE

## 2022-10-22 DIAGNOSIS — R56.9 SEIZURE: ICD-10-CM

## 2022-10-22 PROCEDURE — 70551 MRI BRAIN STEM W/O DYE: CPT | Mod: 26,,, | Performed by: RADIOLOGY

## 2022-10-22 PROCEDURE — 70551 MRI BRAIN STEM W/O DYE: CPT | Mod: TC

## 2022-10-22 PROCEDURE — 70551 MRI BRAIN WITHOUT CONTRAST: ICD-10-PCS | Mod: 26,,, | Performed by: RADIOLOGY

## 2022-10-22 NOTE — PROCEDURES
Routine EEG Report    Roshan Rankin  [id number]  1945    DATE OF SERVICE: 10/21/2022  REASON FOR CONSULT:  77-year-old man with episodes of decreased responsiveness with abnormal movements.  Evaluate for evidence of epileptiform activity.    METHODOLOGY   Electroencephalographic (EEG) recording is with electrodes placed according to the International 10-20 placement system.  Thirty two (32) channels of digital signal (sampling rate of 512/sec) including T1 and T2 was simultaneously recorded from the scalp and may include  EKG, EMG, and/or eye monitors.  Recording band pass was 0.1 to 512 hz.  Digital video recording of the patient is simultaneously recorded with the EEG.  The patient is instructed report clinical symptoms which may occur during the recording session.  EEG and video recording is stored and archived in digital format. Activation procedures which include photic stimulation, hyperventilation and instructing patients to perform simple task are done in selected patients.    The EEG is displayed on a monitor screen and can be reviewed using different montages.  Computer assisted analysis is employed to detect spike and electrographic seizure activity.   The entire record is submitted for computer analysis.  The entire recording is visually reviewed and the times identified by computer analysis as being spikes or seizures are reviewed again.  Compresses spectral analysis (CSA) is also performed on the activity recorded from each individual channel.  This is displayed as a power display of frequencies from 0 to 30 Hz over time.   The CSA is reviewed looking for asymmetries in power between homologous areas of the scalp and then compared with the original EEG recording.     K[employer]ongo software is also utilized in the review of this study.  This software suite analyzes the EEG recording in multiple domains.  Coherence and rhythmicity is computed to identify EEG sections which may contain organized seizures.   Each channel undergoes analysis to detect presence of spike and sharp waves which have special and morphological characteristic of epileptic activity.  The routine EEG recording is converted from spacial into frequency domain.  This is then displayed comparing homologous areas to identify areas of significant asymmetry.  Algorithm to identify non-cortically generated artifact is used to separate eye movement, EMG and other artifact from the EEG.      EEG FINDINGS  Background activity:   The waking background is continuous and symmetric mixed frequency alpha/theta activity with 7.5 hz posterior dominant rhythm seen bilaterally.  There is intermittent generalized and multifocal polymorphic slowing.    Sleep:  There is evidence of state changes with the appearance of sleep architecture.    Activation procedures:   The patient is able to follow simple commands and answers orientation questions correctly. Photic stimulation is performed with no activation of the record.     Cardiac Monitor:   Heart rate appears generally regular on a single lead EKG.    Impression:   This is an abnormal awake and asleep routine EEG because of generalized background slowing consistent with mild cerebral dysfunction.  There are no epileptiform discharges and no electrographic seizures.    Jacque Graham MD PhD  Neurology-Epilepsy  [facility].

## 2022-10-24 DIAGNOSIS — G93.40 ENCEPHALOPATHY: Primary | ICD-10-CM

## 2022-11-04 ENCOUNTER — LAB VISIT (OUTPATIENT)
Dept: LAB | Facility: HOSPITAL | Age: 77
End: 2022-11-04
Attending: PSYCHIATRY & NEUROLOGY
Payer: MEDICARE

## 2022-11-04 DIAGNOSIS — R29.2 ABNORMAL REFLEXES: ICD-10-CM

## 2022-11-04 DIAGNOSIS — R56.9 SEIZURE: ICD-10-CM

## 2022-11-04 LAB — TSH SERPL DL<=0.005 MIU/L-ACNC: 1.98 UIU/ML (ref 0.4–4)

## 2022-11-04 PROCEDURE — 36415 COLL VENOUS BLD VENIPUNCTURE: CPT | Performed by: PSYCHIATRY & NEUROLOGY

## 2022-11-04 PROCEDURE — 84443 ASSAY THYROID STIM HORMONE: CPT | Performed by: PSYCHIATRY & NEUROLOGY

## 2022-12-20 DIAGNOSIS — F10.20 ALCOHOL DEPENDENCE: Primary | ICD-10-CM

## 2022-12-23 ENCOUNTER — HOSPITAL ENCOUNTER (OUTPATIENT)
Dept: RADIOLOGY | Facility: HOSPITAL | Age: 77
Discharge: HOME | End: 2022-12-23
Attending: STUDENT IN AN ORGANIZED HEALTH CARE EDUCATION/TRAINING PROGRAM
Payer: MEDICARE

## 2022-12-23 DIAGNOSIS — F10.20 ALCOHOL DEPENDENCE: ICD-10-CM

## 2022-12-23 PROCEDURE — 76705 ECHO EXAM OF ABDOMEN: CPT | Mod: TC

## 2022-12-23 PROCEDURE — 76705 US ABDOMEN LIMITED: ICD-10-PCS | Mod: 26,,, | Performed by: RADIOLOGY

## 2022-12-23 PROCEDURE — 76705 ECHO EXAM OF ABDOMEN: CPT | Mod: 26,,, | Performed by: RADIOLOGY

## 2023-01-26 ENCOUNTER — OFFICE VISIT (OUTPATIENT)
Dept: NEUROLOGY | Facility: CLINIC | Age: 78
End: 2023-01-26
Payer: MEDICARE

## 2023-01-26 ENCOUNTER — LAB VISIT (OUTPATIENT)
Dept: LAB | Facility: HOSPITAL | Age: 78
End: 2023-01-26
Attending: PSYCHIATRY & NEUROLOGY
Payer: MEDICARE

## 2023-01-26 VITALS
DIASTOLIC BLOOD PRESSURE: 83 MMHG | SYSTOLIC BLOOD PRESSURE: 164 MMHG | HEIGHT: 69 IN | HEART RATE: 63 BPM | BODY MASS INDEX: 36.69 KG/M2 | WEIGHT: 247.69 LBS

## 2023-01-26 DIAGNOSIS — E53.8 B12 DEFICIENCY: ICD-10-CM

## 2023-01-26 DIAGNOSIS — R56.9 SEIZURE: ICD-10-CM

## 2023-01-26 DIAGNOSIS — R56.9 SEIZURE: Primary | ICD-10-CM

## 2023-01-26 DIAGNOSIS — F10.10 ALCOHOL ABUSE: ICD-10-CM

## 2023-01-26 PROCEDURE — 80177 DRUG SCRN QUAN LEVETIRACETAM: CPT | Performed by: PSYCHIATRY & NEUROLOGY

## 2023-01-26 PROCEDURE — 99214 OFFICE O/P EST MOD 30 MIN: CPT | Mod: S$GLB,,, | Performed by: PSYCHIATRY & NEUROLOGY

## 2023-01-26 PROCEDURE — 3288F FALL RISK ASSESSMENT DOCD: CPT | Mod: CPTII,S$GLB,, | Performed by: PSYCHIATRY & NEUROLOGY

## 2023-01-26 PROCEDURE — 3079F PR MOST RECENT DIASTOLIC BLOOD PRESSURE 80-89 MM HG: ICD-10-PCS | Mod: CPTII,S$GLB,, | Performed by: PSYCHIATRY & NEUROLOGY

## 2023-01-26 PROCEDURE — 3077F SYST BP >= 140 MM HG: CPT | Mod: CPTII,S$GLB,, | Performed by: PSYCHIATRY & NEUROLOGY

## 2023-01-26 PROCEDURE — 1159F PR MEDICATION LIST DOCUMENTED IN MEDICAL RECORD: ICD-10-PCS | Mod: CPTII,S$GLB,, | Performed by: PSYCHIATRY & NEUROLOGY

## 2023-01-26 PROCEDURE — 1125F AMNT PAIN NOTED PAIN PRSNT: CPT | Mod: CPTII,S$GLB,, | Performed by: PSYCHIATRY & NEUROLOGY

## 2023-01-26 PROCEDURE — 3288F PR FALLS RISK ASSESSMENT DOCUMENTED: ICD-10-PCS | Mod: CPTII,S$GLB,, | Performed by: PSYCHIATRY & NEUROLOGY

## 2023-01-26 PROCEDURE — 1125F PR PAIN SEVERITY QUANTIFIED, PAIN PRESENT: ICD-10-PCS | Mod: CPTII,S$GLB,, | Performed by: PSYCHIATRY & NEUROLOGY

## 2023-01-26 PROCEDURE — 99999 PR PBB SHADOW E&M-EST. PATIENT-LVL IV: CPT | Mod: PBBFAC,,, | Performed by: PSYCHIATRY & NEUROLOGY

## 2023-01-26 PROCEDURE — 3079F DIAST BP 80-89 MM HG: CPT | Mod: CPTII,S$GLB,, | Performed by: PSYCHIATRY & NEUROLOGY

## 2023-01-26 PROCEDURE — 36415 COLL VENOUS BLD VENIPUNCTURE: CPT | Performed by: PSYCHIATRY & NEUROLOGY

## 2023-01-26 PROCEDURE — 1159F MED LIST DOCD IN RCRD: CPT | Mod: CPTII,S$GLB,, | Performed by: PSYCHIATRY & NEUROLOGY

## 2023-01-26 PROCEDURE — 1160F PR REVIEW ALL MEDS BY PRESCRIBER/CLIN PHARMACIST DOCUMENTED: ICD-10-PCS | Mod: CPTII,S$GLB,, | Performed by: PSYCHIATRY & NEUROLOGY

## 2023-01-26 PROCEDURE — 1160F RVW MEDS BY RX/DR IN RCRD: CPT | Mod: CPTII,S$GLB,, | Performed by: PSYCHIATRY & NEUROLOGY

## 2023-01-26 PROCEDURE — 1100F PR PT FALLS ASSESS DOC 2+ FALLS/FALL W/INJURY/YR: ICD-10-PCS | Mod: CPTII,S$GLB,, | Performed by: PSYCHIATRY & NEUROLOGY

## 2023-01-26 PROCEDURE — 3077F PR MOST RECENT SYSTOLIC BLOOD PRESSURE >= 140 MM HG: ICD-10-PCS | Mod: CPTII,S$GLB,, | Performed by: PSYCHIATRY & NEUROLOGY

## 2023-01-26 PROCEDURE — 99214 PR OFFICE/OUTPT VISIT, EST, LEVL IV, 30-39 MIN: ICD-10-PCS | Mod: S$GLB,,, | Performed by: PSYCHIATRY & NEUROLOGY

## 2023-01-26 PROCEDURE — 1100F PTFALLS ASSESS-DOCD GE2>/YR: CPT | Mod: CPTII,S$GLB,, | Performed by: PSYCHIATRY & NEUROLOGY

## 2023-01-26 PROCEDURE — 99999 PR PBB SHADOW E&M-EST. PATIENT-LVL IV: ICD-10-PCS | Mod: PBBFAC,,, | Performed by: PSYCHIATRY & NEUROLOGY

## 2023-01-26 RX ORDER — LEVETIRACETAM 500 MG/1
500 TABLET ORAL 2 TIMES DAILY
Qty: 180 TABLET | Refills: 1 | Status: SHIPPED | OUTPATIENT
Start: 2023-01-26 | End: 2023-07-11 | Stop reason: SDUPTHER

## 2023-01-26 RX ORDER — ASPIRIN 81 MG/1
81 TABLET ORAL DAILY
Qty: 90 TABLET | Refills: 3 | Status: SHIPPED | OUTPATIENT
Start: 2023-01-26 | End: 2023-12-21

## 2023-01-26 NOTE — PROGRESS NOTES
Subjective:       Patient ID: Roshan Rankin is a 77 y.o. male.    Chief Complaint: Seizures      MRI Brain Without Contrast      Reading Physician Reading Date Result Priority  Filiberto Sahni MD  [phone] 10/22/2022 Routine  Filiberto Sahni MD  [phone] 10/22/2022     Narrative & Impression  EXAMINATION:  MRI BRAIN WITHOUT CONTRAST     CLINICAL HISTORY:  seizures; Unspecified convulsions     TECHNIQUE:  Multiplanar multisequence MR imaging of the brain was performed without contrast.     COMPARISON:  10/07/2020.     FINDINGS:  There is moderate diffuse volume loss again identified.     There is no evidence of hydrocephalus mass effect intracranial hemorrhage or acute infarct.     Numerous foci of increased signal within the periventricular subcortical white matter is nonspecific but may reflect moderate chronic small vessel ischemic change, similar to the prior study.  Chronic right thalamic infarct again identified.     There is a similar appearance of the hippocampal formations with some volume loss on the right compared to the left again identified.  There are symmetric in signal.     Normal arterial flow voids are preserved at the skull base.     Mild anterior ethmoid and left frontal mucosal thickening is similar to the prior CT study.  Mastoid air cells are clear.     Impression:     No acute intracranial process.  Similar appearance of the brain when compared to the prior study.    No cortically based lesion to serve as a seizure focus is identified.  Similar asymmetry of the hippocampal formations with volume loss on the right compared to the left but without signal abnormality, stable.        Electronically signed by: Filiberto Sahni  Date:                                            10/22/2022  Time:                                           15:02        Past Medical History:   Diagnosis Date    Alcohol abuse     Arthritis     Convulsion 10/7/2020    GERD (gastroesophageal reflux disease)      Glaucoma     Hypertension       Past Surgical History:   Procedure Laterality Date    KNEE CARTILAGE SURGERY Right         Current Outpatient Medications:     cholecalciferol, vitamin D3, (VITAMIN D3) 50 mcg (2,000 unit) Cap capsule, Take 2,000 Units by mouth once daily., Disp: , Rfl:     folic acid (FOLVITE) 1 MG tablet, Take 1 mg by mouth once daily., Disp: , Rfl:     furosemide (LASIX) 20 MG tablet, Take 20 mg by mouth once. Pt is taking half tablet, Disp: , Rfl:     latanoprost 0.005 % ophthalmic solution, Place 1 drop into both eyes once daily., Disp: , Rfl:     metoprolol tartrate (LOPRESSOR) 50 MG tablet, Take 1 tablet (50 mg total) by mouth 2 (two) times daily., Disp: 60 tablet, Rfl: 11    omeprazole (PRILOSEC) 40 MG capsule, Take 40 mg by mouth once daily., Disp: , Rfl:     rosuvastatin (CRESTOR) 20 MG tablet, Take 20 mg by mouth every evening., Disp: , Rfl:     aspirin (ECOTRIN) 81 MG EC tablet, Take 1 tablet (81 mg total) by mouth once daily., Disp: 90 tablet, Rfl: 3    levETIRAcetam (KEPPRA) 500 MG Tab, Take 1 tablet (500 mg total) by mouth 2 (two) times daily., Disp: 180 tablet, Rfl: 1    midazolam (NAYZILAM) 5 mg/spray (0.1 mL) Spry, One spray into ne nostril during seizure if has a 2nd seizure within 24 hours. May repeat after 10 minutes if still seizing., Disp: 4 each, Rfl: 1    Current Facility-Administered Medications:     cyanocobalamin injection 1,000 mcg, 1,000 mcg, Intramuscular, 1 time in Clinic/HOD, Catia Aguillon MD   Review of patient's allergies indicates:  No Known Allergies     Review of Systems   Neurological:  Negative for syncope and headaches.   Psychiatric/Behavioral:  Negative for dysphoric mood and sleep disturbance (averages 10 hours a night).          Objective:      Physical Exam      Assessment:       1. Seizure    2. B12 deficiency    3. Alcohol abuse        Plan:         S/p 2 convulsions, remains szr free since last visit  Continues to drink but now ready to see an  addictionologist and this was arranged by PCP.   It is unclear if the szrs were entirely due to etoh and MRI brain does reveal Rt side hippocampal atrophy ( ie could be focus of onset); I favor continue ASM for now. No SE with keppra 500mg bid  Continue same and check level today.  EEG 10-21-22, waking and asleep. Mildly slow but no epileptic activity       B12 def ( 172 in 9-22-23), is currently taking B12 5mg/day ( had taken 5mg/day for months); I asked him to change to 1mg/day and will check his level today.       Rt thalamic infarct by MRI brain  Add asa 81mg if ok with PCP ( hx of GIB in 2016, EGD negative,reamins on omeprazole  Lab ordered by PCP 12-22-22 --lipid panel is good ( LDL 76, , HDL HDL 52)  A1c normal               Catia Aguillon MD   01/26/2023   9:28 AM

## 2023-01-30 ENCOUNTER — PATIENT MESSAGE (OUTPATIENT)
Dept: NEUROLOGY | Facility: CLINIC | Age: 78
End: 2023-01-30
Payer: MEDICARE

## 2023-01-30 LAB — LEVETIRACETAM SERPL-MCNC: 19.2 UG/ML (ref 3–60)

## 2023-07-11 ENCOUNTER — OFFICE VISIT (OUTPATIENT)
Dept: NEUROLOGY | Facility: CLINIC | Age: 78
End: 2023-07-11
Payer: MEDICARE

## 2023-07-11 VITALS
DIASTOLIC BLOOD PRESSURE: 90 MMHG | WEIGHT: 235.88 LBS | HEIGHT: 69 IN | HEART RATE: 61 BPM | SYSTOLIC BLOOD PRESSURE: 167 MMHG | BODY MASS INDEX: 34.94 KG/M2

## 2023-07-11 DIAGNOSIS — R41.3 MEMORY LOSS: ICD-10-CM

## 2023-07-11 DIAGNOSIS — F10.10 ALCOHOL ABUSE: ICD-10-CM

## 2023-07-11 DIAGNOSIS — E53.8 B12 DEFICIENCY: ICD-10-CM

## 2023-07-11 DIAGNOSIS — R56.9 SEIZURE: Primary | ICD-10-CM

## 2023-07-11 PROCEDURE — 1159F PR MEDICATION LIST DOCUMENTED IN MEDICAL RECORD: ICD-10-PCS | Mod: CPTII,S$GLB,, | Performed by: PSYCHIATRY & NEUROLOGY

## 2023-07-11 PROCEDURE — 1159F MED LIST DOCD IN RCRD: CPT | Mod: CPTII,S$GLB,, | Performed by: PSYCHIATRY & NEUROLOGY

## 2023-07-11 PROCEDURE — 3077F PR MOST RECENT SYSTOLIC BLOOD PRESSURE >= 140 MM HG: ICD-10-PCS | Mod: CPTII,S$GLB,, | Performed by: PSYCHIATRY & NEUROLOGY

## 2023-07-11 PROCEDURE — 3288F FALL RISK ASSESSMENT DOCD: CPT | Mod: CPTII,S$GLB,, | Performed by: PSYCHIATRY & NEUROLOGY

## 2023-07-11 PROCEDURE — 99999 PR PBB SHADOW E&M-EST. PATIENT-LVL IV: ICD-10-PCS | Mod: PBBFAC,,, | Performed by: PSYCHIATRY & NEUROLOGY

## 2023-07-11 PROCEDURE — 99214 PR OFFICE/OUTPT VISIT, EST, LEVL IV, 30-39 MIN: ICD-10-PCS | Mod: S$GLB,,, | Performed by: PSYCHIATRY & NEUROLOGY

## 2023-07-11 PROCEDURE — 99214 OFFICE O/P EST MOD 30 MIN: CPT | Mod: S$GLB,,, | Performed by: PSYCHIATRY & NEUROLOGY

## 2023-07-11 PROCEDURE — 3080F PR MOST RECENT DIASTOLIC BLOOD PRESSURE >= 90 MM HG: ICD-10-PCS | Mod: CPTII,S$GLB,, | Performed by: PSYCHIATRY & NEUROLOGY

## 2023-07-11 PROCEDURE — 3080F DIAST BP >= 90 MM HG: CPT | Mod: CPTII,S$GLB,, | Performed by: PSYCHIATRY & NEUROLOGY

## 2023-07-11 PROCEDURE — 3077F SYST BP >= 140 MM HG: CPT | Mod: CPTII,S$GLB,, | Performed by: PSYCHIATRY & NEUROLOGY

## 2023-07-11 PROCEDURE — 1101F PT FALLS ASSESS-DOCD LE1/YR: CPT | Mod: CPTII,S$GLB,, | Performed by: PSYCHIATRY & NEUROLOGY

## 2023-07-11 PROCEDURE — 1160F PR REVIEW ALL MEDS BY PRESCRIBER/CLIN PHARMACIST DOCUMENTED: ICD-10-PCS | Mod: CPTII,S$GLB,, | Performed by: PSYCHIATRY & NEUROLOGY

## 2023-07-11 PROCEDURE — 1101F PR PT FALLS ASSESS DOC 0-1 FALLS W/OUT INJ PAST YR: ICD-10-PCS | Mod: CPTII,S$GLB,, | Performed by: PSYCHIATRY & NEUROLOGY

## 2023-07-11 PROCEDURE — 99999 PR PBB SHADOW E&M-EST. PATIENT-LVL IV: CPT | Mod: PBBFAC,,, | Performed by: PSYCHIATRY & NEUROLOGY

## 2023-07-11 PROCEDURE — 1126F AMNT PAIN NOTED NONE PRSNT: CPT | Mod: CPTII,S$GLB,, | Performed by: PSYCHIATRY & NEUROLOGY

## 2023-07-11 PROCEDURE — 3288F PR FALLS RISK ASSESSMENT DOCUMENTED: ICD-10-PCS | Mod: CPTII,S$GLB,, | Performed by: PSYCHIATRY & NEUROLOGY

## 2023-07-11 PROCEDURE — 1160F RVW MEDS BY RX/DR IN RCRD: CPT | Mod: CPTII,S$GLB,, | Performed by: PSYCHIATRY & NEUROLOGY

## 2023-07-11 PROCEDURE — 1126F PR PAIN SEVERITY QUANTIFIED, NO PAIN PRESENT: ICD-10-PCS | Mod: CPTII,S$GLB,, | Performed by: PSYCHIATRY & NEUROLOGY

## 2023-07-11 RX ORDER — LEVETIRACETAM 500 MG/1
500 TABLET ORAL 2 TIMES DAILY
Qty: 180 TABLET | Refills: 2 | Status: SHIPPED | OUTPATIENT
Start: 2023-07-11 | End: 2024-02-20

## 2023-07-11 NOTE — PROGRESS NOTES
Subjective:       Patient ID: Roshan Rankin is a 77 y.o. male.    Chief Complaint: Seizures      Patient presents with his wife.  No further seizures.  No hospitalizations.  He feels well.      He joined the [employer] and is walking half a mi 3 days a week.    Blood pressure elevated today but he states that he recently did a 1 month monitoring program with PCP in his blood pressure was so good that he is no longer require to continue monitoring.    Still drinking 12 beers a day and not interested in seeing an addiction neurologist.          Past Medical History:   Diagnosis Date    Alcohol abuse     Arthritis     Convulsion 10/7/2020    GERD (gastroesophageal reflux disease)     Glaucoma     Hypertension       Past Surgical History:   Procedure Laterality Date    KNEE CARTILAGE SURGERY Right         Current Outpatient Medications:     aspirin (ECOTRIN) 81 MG EC tablet, Take 1 tablet (81 mg total) by mouth once daily., Disp: 90 tablet, Rfl: 3    cholecalciferol, vitamin D3, (VITAMIN D3) 50 mcg (2,000 unit) Cap capsule, Take 2,000 Units by mouth once daily., Disp: , Rfl:     folic acid (FOLVITE) 1 MG tablet, Take 1 mg by mouth once daily., Disp: , Rfl:     furosemide (LASIX) 20 MG tablet, Take 20 mg by mouth once. Pt is taking half tablet, Disp: , Rfl:     latanoprost 0.005 % ophthalmic solution, Place 1 drop into both eyes once daily., Disp: , Rfl:     metoprolol tartrate (LOPRESSOR) 50 MG tablet, Take 1 tablet (50 mg total) by mouth 2 (two) times daily., Disp: 60 tablet, Rfl: 11    omeprazole (PRILOSEC) 40 MG capsule, Take 40 mg by mouth once daily., Disp: , Rfl:     rosuvastatin (CRESTOR) 20 MG tablet, Take 20 mg by mouth every evening., Disp: , Rfl:     levETIRAcetam (KEPPRA) 500 MG Tab, Take 1 tablet (500 mg total) by mouth 2 (two) times daily., Disp: 180 tablet, Rfl: 2    midazolam (NAYZILAM) 5 mg/spray (0.1 mL) Spry, One spray into ne nostril during seizure if has a 2nd seizure within 24 hours. May repeat after 10  minutes if still seizing., Disp: 4 each, Rfl: 1    Current Facility-Administered Medications:     cyanocobalamin injection 1,000 mcg, 1,000 mcg, Intramuscular, 1 time in Clinic/HOD, Catia Aguillon MD   Review of patient's allergies indicates:  No Known Allergies     Review of Systems   Neurological:  Positive for memory loss (mild).         Objective:      Physical Exam  Constitutional:       Appearance: He is obese. He is not ill-appearing.   Neurological:      Mental Status: He is alert.      Cranial Nerves: No dysarthria.      Motor: Tremor present.      Comments: O times 4  President---Biden    Delayed recall--3/5    Clock drawing---incorrect hands   Psychiatric:         Mood and Affect: Mood normal.         Behavior: Behavior normal.         Thought Content: Thought content normal.         Assessment:       1. Seizure    2. B12 deficiency    3. Alcohol abuse    4. Memory loss        Plan:          Status post 2 convulsions, seizure free on Keppra  Keppra level 19.2 on January 26, on same dosage of 500 mg b.i.d.  No side effects.  Continue same    ETOH abuse, admits to 12 beers a day.  Not interested in seeing an addictionologist  despite repeated discussions.      B12 deficiency--unfortunately he self reduced B12 to only a weekly dose of 1 mg, oral dose.  Patient instructed to increase to 1 mg orally daily and we will check a level in 2 weeks.  If low at that time we will change to injections.      Hypertensive today (asymptomatic)---reportedly had normal readings during the month that he did a home monitoring program.  He will continue to keep an eye on it.  He was reminded of the goal of systolic 130 or less diastolic 80 or less, given history of stroke  .    Regarding history of stroke patient is compliant with aspirin and Crestor.  Has started an exercise program as noted.  LDL 44 on 7-27-22                        Catia Aguillon MD   07/11/2023   10:16 AM

## 2023-07-13 ENCOUNTER — PATIENT MESSAGE (OUTPATIENT)
Dept: NEUROLOGY | Facility: CLINIC | Age: 78
End: 2023-07-13
Payer: MEDICARE

## 2023-07-14 ENCOUNTER — PATIENT MESSAGE (OUTPATIENT)
Dept: NEUROLOGY | Facility: CLINIC | Age: 78
End: 2023-07-14
Payer: MEDICARE

## 2023-08-01 ENCOUNTER — PATIENT MESSAGE (OUTPATIENT)
Dept: NEUROLOGY | Facility: CLINIC | Age: 78
End: 2023-08-01
Payer: MEDICARE

## 2023-12-21 RX ORDER — ASPIRIN 81 MG/1
81 TABLET ORAL
Qty: 90 TABLET | Refills: 3 | Status: SHIPPED | OUTPATIENT
Start: 2023-12-21

## 2024-02-20 RX ORDER — LEVETIRACETAM 500 MG/1
500 TABLET ORAL 2 TIMES DAILY
Qty: 180 TABLET | Refills: 1 | Status: SHIPPED | OUTPATIENT
Start: 2024-02-20 | End: 2024-08-18

## 2024-02-21 ENCOUNTER — TELEPHONE (OUTPATIENT)
Dept: NEUROLOGY | Facility: CLINIC | Age: 79
End: 2024-02-21
Payer: MEDICARE

## 2024-06-25 ENCOUNTER — OFFICE VISIT (OUTPATIENT)
Dept: NEUROLOGY | Facility: CLINIC | Age: 79
End: 2024-06-25
Payer: MEDICARE

## 2024-06-25 ENCOUNTER — LAB VISIT (OUTPATIENT)
Dept: LAB | Facility: HOSPITAL | Age: 79
End: 2024-06-25
Attending: PSYCHIATRY & NEUROLOGY
Payer: MEDICARE

## 2024-06-25 VITALS
HEART RATE: 63 BPM | DIASTOLIC BLOOD PRESSURE: 84 MMHG | WEIGHT: 249.13 LBS | SYSTOLIC BLOOD PRESSURE: 162 MMHG | HEIGHT: 69 IN | BODY MASS INDEX: 36.9 KG/M2

## 2024-06-25 DIAGNOSIS — E53.8 B12 DEFICIENCY: ICD-10-CM

## 2024-06-25 DIAGNOSIS — F10.10 ALCOHOL ABUSE: ICD-10-CM

## 2024-06-25 DIAGNOSIS — R56.9 SEIZURE: Primary | ICD-10-CM

## 2024-06-25 DIAGNOSIS — R56.9 SEIZURE: ICD-10-CM

## 2024-06-25 LAB
ALBUMIN SERPL BCP-MCNC: 4.4 G/DL (ref 3.5–5.2)
ALP SERPL-CCNC: 75 U/L (ref 55–135)
ALT SERPL W/O P-5'-P-CCNC: 16 U/L (ref 10–44)
ANION GAP SERPL CALC-SCNC: 11 MMOL/L (ref 8–16)
AST SERPL-CCNC: 24 U/L (ref 10–40)
BILIRUB SERPL-MCNC: 1.3 MG/DL (ref 0.1–1)
BUN SERPL-MCNC: 9 MG/DL (ref 8–23)
CALCIUM SERPL-MCNC: 9.5 MG/DL (ref 8.7–10.5)
CHLORIDE SERPL-SCNC: 100 MMOL/L (ref 95–110)
CO2 SERPL-SCNC: 24 MMOL/L (ref 23–29)
CREAT SERPL-MCNC: 1 MG/DL (ref 0.5–1.4)
EST. GFR  (NO RACE VARIABLE): >60 ML/MIN/1.73 M^2
GLUCOSE SERPL-MCNC: 127 MG/DL (ref 70–110)
POTASSIUM SERPL-SCNC: 4.2 MMOL/L (ref 3.5–5.1)
PROT SERPL-MCNC: 7.9 G/DL (ref 6–8.4)
SODIUM SERPL-SCNC: 135 MMOL/L (ref 136–145)

## 2024-06-25 PROCEDURE — 1126F AMNT PAIN NOTED NONE PRSNT: CPT | Mod: CPTII,S$GLB,, | Performed by: PSYCHIATRY & NEUROLOGY

## 2024-06-25 PROCEDURE — 3288F FALL RISK ASSESSMENT DOCD: CPT | Mod: CPTII,S$GLB,, | Performed by: PSYCHIATRY & NEUROLOGY

## 2024-06-25 PROCEDURE — 82607 VITAMIN B-12: CPT | Performed by: PSYCHIATRY & NEUROLOGY

## 2024-06-25 PROCEDURE — 80177 DRUG SCRN QUAN LEVETIRACETAM: CPT | Performed by: PSYCHIATRY & NEUROLOGY

## 2024-06-25 PROCEDURE — 99214 OFFICE O/P EST MOD 30 MIN: CPT | Mod: S$GLB,,, | Performed by: PSYCHIATRY & NEUROLOGY

## 2024-06-25 PROCEDURE — 99999 PR PBB SHADOW E&M-EST. PATIENT-LVL III: CPT | Mod: PBBFAC,,, | Performed by: PSYCHIATRY & NEUROLOGY

## 2024-06-25 PROCEDURE — 1101F PT FALLS ASSESS-DOCD LE1/YR: CPT | Mod: CPTII,S$GLB,, | Performed by: PSYCHIATRY & NEUROLOGY

## 2024-06-25 PROCEDURE — 1159F MED LIST DOCD IN RCRD: CPT | Mod: CPTII,S$GLB,, | Performed by: PSYCHIATRY & NEUROLOGY

## 2024-06-25 PROCEDURE — 3079F DIAST BP 80-89 MM HG: CPT | Mod: CPTII,S$GLB,, | Performed by: PSYCHIATRY & NEUROLOGY

## 2024-06-25 PROCEDURE — 36415 COLL VENOUS BLD VENIPUNCTURE: CPT | Performed by: PSYCHIATRY & NEUROLOGY

## 2024-06-25 PROCEDURE — 80053 COMPREHEN METABOLIC PANEL: CPT | Performed by: PSYCHIATRY & NEUROLOGY

## 2024-06-25 PROCEDURE — 3077F SYST BP >= 140 MM HG: CPT | Mod: CPTII,S$GLB,, | Performed by: PSYCHIATRY & NEUROLOGY

## 2024-06-25 RX ORDER — MEMANTINE HYDROCHLORIDE 5 MG/1
5 TABLET ORAL DAILY
COMMUNITY
Start: 2024-03-29

## 2024-06-25 RX ORDER — HYDROCORTISONE 1 %
1000 GEL (GRAM) TOPICAL EVERY MORNING
COMMUNITY
Start: 2022-08-01

## 2024-06-25 RX ORDER — LOSARTAN POTASSIUM 25 MG/1
25 TABLET ORAL DAILY
COMMUNITY
Start: 2024-03-29

## 2024-06-25 RX ORDER — LEVETIRACETAM 500 MG/1
500 TABLET ORAL 2 TIMES DAILY
Qty: 180 TABLET | Refills: 1 | Status: SHIPPED | OUTPATIENT
Start: 2024-06-25 | End: 2024-06-25

## 2024-06-25 RX ORDER — LEVETIRACETAM 500 MG/1
500 TABLET ORAL 2 TIMES DAILY
Qty: 180 TABLET | Refills: 3 | Status: SHIPPED | OUTPATIENT
Start: 2024-06-25 | End: 2025-06-25

## 2024-06-25 NOTE — PROGRESS NOTES
Subjective:       Patient ID: Roshna Rankin is a 78 y.o. male.    Chief Complaint: Seizures      Pt presents w/his wife.   Is stable, szr free.   No falls, no syncope. Feels well.       MRI brain w/o cx  Impression:     No acute intracranial process.  Similar appearance of the brain when compared to the prior study.    No cortically based lesion to serve as a seizure focus is identified.  Similar asymmetry of the hippocampal formations with volume loss on the right compared to the left but without signal abnormality, stable.        Electronically signed by:Filiberto Sahni  Date:                                            10/22/2022  Time:                                           15:02        Exam Ended: 10/22/22 13:45 CDT              Past Medical History:   Diagnosis Date    Alcohol abuse     Arthritis     Convulsion 10/7/2020    GERD (gastroesophageal reflux disease)     Glaucoma     Hypertension       Past Surgical History:   Procedure Laterality Date    KNEE CARTILAGE SURGERY Right         Current Outpatient Medications:     aspirin (ECOTRIN) 81 MG EC tablet, TAKE 1 TABLET ONE TIME DAILY, Disp: 90 tablet, Rfl: 3    cholecalciferol, vitamin D3, (VITAMIN D3) 50 mcg (2,000 unit) Cap capsule, Take 2,000 Units by mouth once daily., Disp: , Rfl:     cyanocobalamin-cobamamide (B12) 5,000-100 mcg Lozg, Take 1,000 mcg by mouth every morning., Disp: , Rfl:     folic acid (FOLVITE) 1 MG tablet, Take 1 mg by mouth once daily., Disp: , Rfl:     furosemide (LASIX) 20 MG tablet, Take 20 mg by mouth once. Pt is taking half tablet, Disp: , Rfl:     latanoprost 0.005 % ophthalmic solution, Place 1 drop into both eyes once daily., Disp: , Rfl:     losartan (COZAAR) 25 MG tablet, Take 25 mg by mouth once daily., Disp: , Rfl:     memantine (NAMENDA) 5 MG Tab, Take 5 mg by mouth once daily., Disp: , Rfl:     metoprolol tartrate (LOPRESSOR) 50 MG tablet, Take 1 tablet (50 mg total) by mouth 2 (two) times daily., Disp: 60 tablet, Rfl:  11    omeprazole (PRILOSEC) 40 MG capsule, Take 40 mg by mouth once daily., Disp: , Rfl:     rosuvastatin (CRESTOR) 20 MG tablet, Take 20 mg by mouth every evening., Disp: , Rfl:     levETIRAcetam (KEPPRA) 500 MG Tab, Take 1 tablet (500 mg total) by mouth 2 (two) times daily., Disp: 180 tablet, Rfl: 3    midazolam (NAYZILAM) 5 mg/spray (0.1 mL) Spry, One spray into ne nostril during seizure if has a 2nd seizure within 24 hours. May repeat after 10 minutes if still seizing. (Patient not taking: Reported on 6/25/2024), Disp: 4 each, Rfl: 1    Current Facility-Administered Medications:     cyanocobalamin injection 1,000 mcg, 1,000 mcg, Intramuscular, 1 time in Clinic/HOD, Catia Aguillon MD   Review of patient's allergies indicates:  No Known Allergies     Review of Systems   Neurological:  Positive for memory loss (minor). Negative for seizures, syncope and headaches.   Psychiatric/Behavioral:  Negative for confusion and sleep disturbance.            Objective:      Physical Exam  Constitutional:       Appearance: He is obese. He is not ill-appearing.   Neurological:      Mental Status: He is alert.      Gait: Gait normal.      Comments: Normal speech and language and good insight   Psychiatric:         Behavior: Behavior normal.         Thought Content: Thought content normal.           Assessment:       1. Seizure    2. Alcohol abuse    3. B12 deficiency        Plan:          Patient remains seizure-free, and compliant with Keppra with no side effects.    Keppra 19.2 on 1-26-24, on 500 mg BID  The primary issue is alcohol abuse and he continues to drink beer daily.  He has cut down from 12 beers to 8 a day.  I again offered an addiction referral but he is not interested.    Level of function is good and he has not had any falls or syncopal events.    There has been no change in very mild memory dysfunction although he admits he does have to take notes.      Blood pressure tends to be high when he comes to the  office but his wife checks it at home and it is consistently normal.      B12 deficiency, he is compliant with B12 daily    Hx of CVA--takes asa 81 and crestor  LDL 44 on 7-2022  No events    RFU one yr              Catia Aguillon MD   06/25/2024   9:15 AM

## 2024-06-26 LAB — VIT B12 SERPL-MCNC: 406 NG/L (ref 180–914)

## 2024-06-28 LAB — LEVETIRACETAM SERPL-MCNC: 19 UG/ML (ref 3–60)

## 2024-09-10 NOTE — ASSESSMENT & PLAN NOTE
Knee pain, bilateral  Pt complaining of bilateral knee pain. He reports that the pain is chronic due to bilateral arthritis and is an 8/10 at home on a normal basis. He had 2 surgeries on his L knee many years ago after a football injury. He walks with a limp at baseline due to the L knee pain. However, pain has increased during hospitalization.    - Physical exam remarkable for pain with ROM bilaterally and warmth to R knee. No bony tenderness to palpation, no joint swelling or erythema noted, 5/5 strength bilaterally- although assessment limited 2/2 pain  - PT/OT consulted- recommending rehab  - ID concern for R knee infection, ortho surgery consulted for septic arthritis rule out  - X-ray knees bilaterally:   R Knee: Severe OA with significant erosion of joint space in medial, lateral, and patellofemoral compartments.  Significant osteophytosis. No signs of fracture or dislocation.  Calcific artery disease of popliteal vessel noted.    L Knee: Evidence of osteoarthritis with medial and lateral joint space narrowing with osteophytosis.  Medial compartment more affected than latera.  No evidence of fracture or dislocation. Calcific artery disease of popliteal vessel noted.   - Arthrocentesis performed of bilateral knees. Both knees had cell count <50k and were positive for pseudogout.  - Uric acid WNL  - no concern at this time for septic arthritis. no orthopedic follow up necessary  - Start colchicine 1.2 mg once then 0.6 mg daily  - Naproxen 250 bid  - lidocaine patch prn   Detail Level: Detailed Spray Paint Technique: No Spray Paint Text: The liquid nitrogen was applied to the skin utilizing a spray paint frosting technique. Post-Care Instructions: I reviewed with the patient in detail post-care instructions. Patient is to wear sunprotection, and avoid picking at any of the treated lesions. Pt may apply Vaseline to crusted or scabbing areas. Show Topical Anesthesia Variable?: Yes Medical Necessity Clause: This procedure was medically necessary because the lesions that were treated were: Medical Necessity Information: It is in your best interest to select a reason for this procedure from the list below. All of these items fulfill various CMS LCD requirements except the new and changing color options. Consent: The patient's consent was obtained including but not limited to risks of crusting, scabbing, blistering, scarring, darker or lighter pigmentary change, recurrence, incomplete removal and infection.

## 2025-06-18 RX ORDER — LEVETIRACETAM 500 MG/1
500 TABLET ORAL 2 TIMES DAILY
Qty: 180 TABLET | Refills: 3 | Status: SHIPPED | OUTPATIENT
Start: 2025-06-18

## 2025-07-29 ENCOUNTER — LAB VISIT (OUTPATIENT)
Dept: LAB | Facility: HOSPITAL | Age: 80
End: 2025-07-29
Attending: PSYCHIATRY & NEUROLOGY
Payer: MEDICARE

## 2025-07-29 ENCOUNTER — OFFICE VISIT (OUTPATIENT)
Dept: NEUROLOGY | Facility: CLINIC | Age: 80
End: 2025-07-29
Payer: MEDICARE

## 2025-07-29 VITALS
WEIGHT: 251.31 LBS | DIASTOLIC BLOOD PRESSURE: 83 MMHG | SYSTOLIC BLOOD PRESSURE: 186 MMHG | HEART RATE: 70 BPM | HEIGHT: 69 IN | BODY MASS INDEX: 37.22 KG/M2

## 2025-07-29 DIAGNOSIS — E53.8 FOLATE DEFICIENCY: ICD-10-CM

## 2025-07-29 DIAGNOSIS — R41.89 COGNITIVE CHANGE: Primary | ICD-10-CM

## 2025-07-29 DIAGNOSIS — G44.209 ACUTE NON INTRACTABLE TENSION-TYPE HEADACHE: ICD-10-CM

## 2025-07-29 DIAGNOSIS — R41.3 OTHER AMNESIA: ICD-10-CM

## 2025-07-29 DIAGNOSIS — E53.8 B12 DEFICIENCY: ICD-10-CM

## 2025-07-29 DIAGNOSIS — Z87.898 HISTORY OF CONVULSIONS: ICD-10-CM

## 2025-07-29 LAB
ERYTHROCYTE [SEDIMENTATION RATE] IN BLOOD BY PHOTOMETRIC METHOD: <2 MM/HR
FOLATE SERPL-MCNC: 15.6 NG/ML (ref 4–24)

## 2025-07-29 PROCEDURE — 83884 ASSAY NEURFLMNT LIGHT CHAIN: CPT

## 2025-07-29 PROCEDURE — 85652 RBC SED RATE AUTOMATED: CPT

## 2025-07-29 PROCEDURE — 3079F DIAST BP 80-89 MM HG: CPT | Mod: CPTII,S$GLB,, | Performed by: PSYCHIATRY & NEUROLOGY

## 2025-07-29 PROCEDURE — 1101F PT FALLS ASSESS-DOCD LE1/YR: CPT | Mod: CPTII,S$GLB,, | Performed by: PSYCHIATRY & NEUROLOGY

## 2025-07-29 PROCEDURE — 1126F AMNT PAIN NOTED NONE PRSNT: CPT | Mod: CPTII,S$GLB,, | Performed by: PSYCHIATRY & NEUROLOGY

## 2025-07-29 PROCEDURE — 99215 OFFICE O/P EST HI 40 MIN: CPT | Mod: S$GLB,,, | Performed by: PSYCHIATRY & NEUROLOGY

## 2025-07-29 PROCEDURE — 82746 ASSAY OF FOLIC ACID SERUM: CPT

## 2025-07-29 PROCEDURE — 99999 PR PBB SHADOW E&M-EST. PATIENT-LVL III: CPT | Mod: PBBFAC,,, | Performed by: PSYCHIATRY & NEUROLOGY

## 2025-07-29 PROCEDURE — 3077F SYST BP >= 140 MM HG: CPT | Mod: CPTII,S$GLB,, | Performed by: PSYCHIATRY & NEUROLOGY

## 2025-07-29 PROCEDURE — 3288F FALL RISK ASSESSMENT DOCD: CPT | Mod: CPTII,S$GLB,, | Performed by: PSYCHIATRY & NEUROLOGY

## 2025-07-29 PROCEDURE — 36415 COLL VENOUS BLD VENIPUNCTURE: CPT

## 2025-07-29 PROCEDURE — 1159F MED LIST DOCD IN RCRD: CPT | Mod: CPTII,S$GLB,, | Performed by: PSYCHIATRY & NEUROLOGY

## 2025-07-29 PROCEDURE — 1160F RVW MEDS BY RX/DR IN RCRD: CPT | Mod: CPTII,S$GLB,, | Performed by: PSYCHIATRY & NEUROLOGY

## 2025-07-29 RX ORDER — MEMANTINE HYDROCHLORIDE 10 MG/1
10 TABLET ORAL 2 TIMES DAILY
Qty: 180 TABLET | Refills: 3 | Status: SHIPPED | OUTPATIENT
Start: 2025-07-29 | End: 2026-07-29

## 2025-07-29 RX ORDER — LEVETIRACETAM 500 MG/1
500 TABLET ORAL 2 TIMES DAILY
Qty: 180 TABLET | Refills: 3 | Status: SHIPPED | OUTPATIENT
Start: 2025-07-29

## 2025-07-29 NOTE — PROGRESS NOTES
Subjective:       Patient ID: Roshan Rankin is a 80 y.o. male.    Chief Complaint: Seizures (Patient is here for a follow up on his seizures.)    History of Present Illness    Patient presents today for follow-up of seizure disorder and memory concerns. He reports daily short-term memory issues, specifically difficulty recalling information from the previous day or earlier the same day while long-term memory remains intact. These memory problems began after seizures in 2020. Family members have observed these difficulties, often pointing out when he has been previously told information. He experiences challenges with task completion and following directions, such as uncertainty about equipment battery charging. He denies perception that memory issues are progressively worsening. He reports new onset of mild frontal headaches associated with sinus congestion over the past two weeks. The headaches are localized to the forehead region and typically resolve spontaneously. He denies having a headache this morning, and reports no severe pain, radiation, or additional symptoms. First cousin developed Alzheimer's at age 73, and multiple other cousins developed dementia in late 60s. No direct parental history of dementia. He consumes 8-10 Minaya Lite beers daily. He exercises at [employer] 3 times weekly, completing eight total laps per session, divided into two sets of four laps with rest periods due to back pain. He notes decreased exercise capacity compared to previous levels. He takes Keppra twice daily, memantine 5mg, monthly B12 injections with oral B12 supplement, vitamin D3, and folic acid supplements. He is scheduled for cataract surgery.        Presents with his wife  Reports he is doing well, szr free; however his wife is concerned with mild cognitive changes.    History of Present Illness  Patient presents today for follow-up of seizure disorder and memory concerns. He reports daily short-term memory lapses, ie  forgetting what was told, specifically difficulty recalling information from the previous day or earlier the same day while long-term memory remains intact. These memory problems began about 5 years ago. Family members have observed these difficulties, often pointing out when he has been previously told information. He experiences challenges with task completion and following directions, such as uncertainty about equipment battery charging. He denies perception that memory issues are progressively worsening.   He reports new onset of mild frontal headaches associated with sinus congestion over the past two weeks. The headaches are localized to the forehead region and typically resolve spontaneously. He denies having a headache this morning, and reports no severe pain, radiation, or additional symptoms.   First cousin developed Alzheimer's at age 73, and multiple other cousins developed dementia in late 60s. No direct parental history of dementia.   He consumes 8-10 Minaya Lite beers daily. He exercises at [employer] 3 times weekly, completing eight total laps per session, divided into two sets of four laps with rest periods due to back pain.   He notes decreased exercise capacity compared to previous levels.   He takes Keppra 500 twice daily, memantine 5mg, monthly B12 injections with oral B12 supplement, vitamin D3, and folic acid supplements. He is scheduled for cataract surgery.                MRI brain w/o cx  Impression:   No acute intracranial process.  Similar appearance of the brain when compared to the prior study.    No cortically based lesion to serve as a seizure focus is identified.  Similar asymmetry of the hippocampal formations with volume loss on the right compared to the left but without signal abnormality, stable.    Electronically signed by:Filiberto Sahni  Date:                                            10/22/2022  Time:                                           15:02          Patient remains  seizure-free, and compliant with Keppra with no side effects.    Keppra 19.2 on 1-26-24, on 500 mg BID  The primary issue is alcohol abuse and he continues to drink beer daily.  He has cut down from 12 beers to 8 a day.  I again offered an addiction referral but he is not interested.    Level of function is good and he has not had any falls or syncopal events.    There has been no change in very mild memory dysfunction although he admits he does have to take notes.       Blood pressure tends to be high when he comes to the office but his wife checks it at home and it is consistently normal.       B12 deficiency, he is compliant with B12 daily     Hx of CVA--takes asa 81 and crestor  LDL 44 on 7-2022  No events     RFU one yr          Catia Aguillon MD   06/25/2024   9:15 AM           Review of Systems   Constitutional:  Negative for fatigue.   Neurological:  Positive for headaches and memory loss.   Psychiatric/Behavioral:  Negative for sleep disturbance. The patient is not nervous/anxious.            Objective:      Physical Exam  Constitutional:       Appearance: He is obese. He is not ill-appearing.   Neurological:      Mental Status: He is alert.      Cranial Nerves: No dysarthria.      Motor: Tremor (resting tremor of Rt arm) present.      Gait: Gait normal.      Comments: [facility]  24/30   Psychiatric:         Behavior: Behavior normal. Behavior is cooperative.         Thought Content: Thought content normal.         Cognition and Memory: Cognition is impaired.         Judgment: Judgment is not inappropriate.      Comments: [facility] 24/30           Assessment:       1. Cognitive change    2. History of convulsions    3. B12 deficiency    4. Other amnesia    5. Acute non intractable tension-type headache    6. 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  - Despite Minaya Lite being lower in alcohol content, the quantity remains concerning.  - Advised gradual reduction of alcohol intake, particularly in the 2 weeks before cataract surgery due to blood-thinning effects.  - Discussed that future Alzheimer's treatments would require reducing or eliminating alcohol consumption.    SEIZURE:  - Patient reports no current seizures.    ## EPILEPSY:  - Discussed the potential relationship between past seizures and current memory issues that began in 2020.    ## MEMORY ISSUES / COGNITIVE IMPAIRMENT:  - Patient reports daily short-term memory issues while long-term memory remains intact.  - Family members have also noted these short-term recall problems.  - Memory issues have become more noticeable since seizures started in 2020.    ## HEADACHE:  - Patient reports mild morning headaches in the forehead area, possibly related to sinus congestion or sleep apnea.  - Ordered inflammatory marker blood test to investigate recent headaches.  - Considering sleep apnea screening pending memory test results.  - MRI ordered will also help evaluate headaches in conjunction with memory issues.    ## ACUTE SINUSITIS:  - Patient reports modest sinus congestion lasting about 2 weeks, possibly linked to headaches.  - Has been taking sinus medication for congestion.    ## LOW BACK PAIN:  - Patient experiences back pain after walking laps, requiring rest.    ## CATARACT:  - Cataract surgery scheduled for September  5th.  - Patient reports difficulty seeing road signs and issues with sun glare while driving.  - Discussed benefits of surgery, including improved vision clarity.          Amnestic MCI  His wife reports STM dysfunction times 5 years;  AUD ( 8-10 Minaya Light's per day) and w/FH of dementia  Three 1st cousins had AVTAR, onset late 60's or  early 70's  Cognition not really worsening according to his wife  Plan MRI brain and NfL.  B12 406 (6-25-24), on monthly IM B12 now  See orders.  Consider sleep study.  He was prescribed memantine by primary care in his taking 5 mg a day and I will titrate it upward.      Morning bitemporal headache times 2 weeks--new. mild and self aborts. Takes no OTC meds.    Does have residual sinus congestion since a sinus infection 2 weeks ago. Likely was a virus or allergies but  Will check ESR    Previous convulsion none in several years.  Were likely due to ETOH abuse    Having cataract surgery soon         This note was generated with the assistance of ambient listening technology. Verbal consent was obtained by the patient and accompanying visitor(s) for the recording of patient appointment to facilitate this note. I attest to having reviewed and edited the generated note for accuracy, though some syntax or spelling errors may persist. Please contact the author of this note for any clarification.

## 2025-07-31 LAB — NEUROFILAMENT LIGHT CHAIN, PLASMA: 15.1 PG/ML

## 2025-08-12 ENCOUNTER — HOSPITAL ENCOUNTER (OUTPATIENT)
Dept: RADIOLOGY | Facility: HOSPITAL | Age: 80
Discharge: HOME | End: 2025-08-12
Attending: PSYCHIATRY & NEUROLOGY
Payer: MEDICARE

## 2025-08-12 DIAGNOSIS — R41.89 COGNITIVE CHANGE: ICD-10-CM

## 2025-08-12 DIAGNOSIS — R41.3 OTHER AMNESIA: ICD-10-CM

## 2025-08-12 DIAGNOSIS — R41.3 MEMORY LOSS: Primary | ICD-10-CM

## 2025-08-12 PROCEDURE — 70551 MRI BRAIN STEM W/O DYE: CPT | Mod: TC

## 2025-08-12 PROCEDURE — 70551 MRI BRAIN STEM W/O DYE: CPT | Mod: 26,,, | Performed by: RADIOLOGY

## 2025-08-13 ENCOUNTER — PATIENT MESSAGE (OUTPATIENT)
Dept: NEUROLOGY | Facility: CLINIC | Age: 80
End: 2025-08-13
Payer: MEDICARE

## 2025-08-21 ENCOUNTER — LAB VISIT (OUTPATIENT)
Dept: LAB | Facility: HOSPITAL | Age: 80
End: 2025-08-21
Attending: PSYCHIATRY & NEUROLOGY
Payer: MEDICARE

## 2025-08-21 DIAGNOSIS — R41.89 COGNITIVE CHANGE: ICD-10-CM

## 2025-08-21 PROCEDURE — 36415 COLL VENOUS BLD VENIPUNCTURE: CPT

## 2025-08-21 PROCEDURE — 84393 TAU PHOSPHORYLATED EA: CPT

## 2025-08-25 LAB
IMMUNOLOGIST REVIEW: ABNORMAL
M PHOSPHO-TAU 217: 0.21 PG/ML